# Patient Record
Sex: FEMALE | Race: WHITE | Employment: OTHER | URBAN - METROPOLITAN AREA
[De-identification: names, ages, dates, MRNs, and addresses within clinical notes are randomized per-mention and may not be internally consistent; named-entity substitution may affect disease eponyms.]

---

## 2017-01-26 ENCOUNTER — OFFICE VISIT (OUTPATIENT)
Dept: ORTHOPEDIC SURGERY | Age: 82
End: 2017-01-26

## 2017-01-26 VITALS
BODY MASS INDEX: 42.21 KG/M2 | HEIGHT: 67 IN | HEART RATE: 55 BPM | WEIGHT: 268.96 LBS | DIASTOLIC BLOOD PRESSURE: 70 MMHG | SYSTOLIC BLOOD PRESSURE: 158 MMHG

## 2017-01-26 DIAGNOSIS — M17.11 PRIMARY OSTEOARTHRITIS OF RIGHT KNEE: Primary | ICD-10-CM

## 2017-01-26 DIAGNOSIS — M25.561 PAIN, JOINT, KNEE, RIGHT: ICD-10-CM

## 2017-01-26 PROCEDURE — G8427 DOCREV CUR MEDS BY ELIG CLIN: HCPCS | Performed by: ORTHOPAEDIC SURGERY

## 2017-01-26 PROCEDURE — 20611 DRAIN/INJ JOINT/BURSA W/US: CPT | Performed by: ORTHOPAEDIC SURGERY

## 2017-01-26 PROCEDURE — 99213 OFFICE O/P EST LOW 20 MIN: CPT | Performed by: ORTHOPAEDIC SURGERY

## 2017-01-26 PROCEDURE — 73564 X-RAY EXAM KNEE 4 OR MORE: CPT | Performed by: ORTHOPAEDIC SURGERY

## 2017-01-26 PROCEDURE — 1123F ACP DISCUSS/DSCN MKR DOCD: CPT | Performed by: ORTHOPAEDIC SURGERY

## 2017-01-26 PROCEDURE — 1036F TOBACCO NON-USER: CPT | Performed by: ORTHOPAEDIC SURGERY

## 2017-01-26 PROCEDURE — G8419 CALC BMI OUT NRM PARAM NOF/U: HCPCS | Performed by: ORTHOPAEDIC SURGERY

## 2017-01-26 PROCEDURE — 4040F PNEUMOC VAC/ADMIN/RCVD: CPT | Performed by: ORTHOPAEDIC SURGERY

## 2017-01-26 PROCEDURE — 1090F PRES/ABSN URINE INCON ASSESS: CPT | Performed by: ORTHOPAEDIC SURGERY

## 2017-01-26 PROCEDURE — G8484 FLU IMMUNIZE NO ADMIN: HCPCS | Performed by: ORTHOPAEDIC SURGERY

## 2017-01-26 RX ORDER — CLONIDINE HYDROCHLORIDE 0.1 MG/1
0.1 TABLET ORAL 2 TIMES DAILY
Status: ON HOLD | COMMUNITY
End: 2017-12-01 | Stop reason: CLARIF

## 2017-02-06 ENCOUNTER — OFFICE VISIT (OUTPATIENT)
Dept: ORTHOPEDIC SURGERY | Age: 82
End: 2017-02-06

## 2017-02-06 DIAGNOSIS — M17.11 PRIMARY OSTEOARTHRITIS OF RIGHT KNEE: Primary | ICD-10-CM

## 2017-02-06 PROCEDURE — 20611 DRAIN/INJ JOINT/BURSA W/US: CPT | Performed by: ORTHOPAEDIC SURGERY

## 2017-02-06 PROCEDURE — 1036F TOBACCO NON-USER: CPT | Performed by: ORTHOPAEDIC SURGERY

## 2017-02-13 ENCOUNTER — OFFICE VISIT (OUTPATIENT)
Dept: ORTHOPEDIC SURGERY | Age: 82
End: 2017-02-13

## 2017-02-13 DIAGNOSIS — M17.11 PRIMARY OSTEOARTHRITIS OF RIGHT KNEE: Primary | ICD-10-CM

## 2017-02-13 PROCEDURE — 20611 DRAIN/INJ JOINT/BURSA W/US: CPT | Performed by: ORTHOPAEDIC SURGERY

## 2017-02-13 PROCEDURE — 1036F TOBACCO NON-USER: CPT | Performed by: ORTHOPAEDIC SURGERY

## 2017-03-01 ENCOUNTER — OFFICE VISIT (OUTPATIENT)
Dept: ORTHOPEDIC SURGERY | Age: 82
End: 2017-03-01

## 2017-03-01 VITALS
BODY MASS INDEX: 47.09 KG/M2 | HEART RATE: 59 BPM | SYSTOLIC BLOOD PRESSURE: 124 MMHG | WEIGHT: 293 LBS | DIASTOLIC BLOOD PRESSURE: 52 MMHG | HEIGHT: 66 IN

## 2017-03-01 DIAGNOSIS — M51.36 DDD (DEGENERATIVE DISC DISEASE), LUMBAR: Primary | ICD-10-CM

## 2017-03-01 PROCEDURE — G8484 FLU IMMUNIZE NO ADMIN: HCPCS | Performed by: PHYSICAL MEDICINE & REHABILITATION

## 2017-03-01 PROCEDURE — 1123F ACP DISCUSS/DSCN MKR DOCD: CPT | Performed by: PHYSICAL MEDICINE & REHABILITATION

## 2017-03-01 PROCEDURE — 72100 X-RAY EXAM L-S SPINE 2/3 VWS: CPT | Performed by: PHYSICAL MEDICINE & REHABILITATION

## 2017-03-01 PROCEDURE — G8427 DOCREV CUR MEDS BY ELIG CLIN: HCPCS | Performed by: PHYSICAL MEDICINE & REHABILITATION

## 2017-03-01 PROCEDURE — 4040F PNEUMOC VAC/ADMIN/RCVD: CPT | Performed by: PHYSICAL MEDICINE & REHABILITATION

## 2017-03-01 PROCEDURE — 99213 OFFICE O/P EST LOW 20 MIN: CPT | Performed by: PHYSICAL MEDICINE & REHABILITATION

## 2017-03-01 PROCEDURE — G8417 CALC BMI ABV UP PARAM F/U: HCPCS | Performed by: PHYSICAL MEDICINE & REHABILITATION

## 2017-03-01 PROCEDURE — 1036F TOBACCO NON-USER: CPT | Performed by: PHYSICAL MEDICINE & REHABILITATION

## 2017-03-01 PROCEDURE — 1090F PRES/ABSN URINE INCON ASSESS: CPT | Performed by: PHYSICAL MEDICINE & REHABILITATION

## 2017-03-01 RX ORDER — TRAMADOL HYDROCHLORIDE 50 MG/1
TABLET ORAL
Qty: 150 TABLET | Refills: 2 | Status: SHIPPED | OUTPATIENT
Start: 2017-03-01 | End: 2017-09-05

## 2017-06-06 ENCOUNTER — OFFICE VISIT (OUTPATIENT)
Dept: ORTHOPEDIC SURGERY | Age: 82
End: 2017-06-06

## 2017-06-06 VITALS
BODY MASS INDEX: 47.09 KG/M2 | SYSTOLIC BLOOD PRESSURE: 106 MMHG | HEIGHT: 66 IN | WEIGHT: 293 LBS | DIASTOLIC BLOOD PRESSURE: 42 MMHG | HEART RATE: 57 BPM

## 2017-06-06 DIAGNOSIS — M51.36 DDD (DEGENERATIVE DISC DISEASE), LUMBAR: Primary | ICD-10-CM

## 2017-06-06 DIAGNOSIS — M48.061 LUMBAR STENOSIS: ICD-10-CM

## 2017-06-06 PROCEDURE — 99214 OFFICE O/P EST MOD 30 MIN: CPT | Performed by: PHYSICIAN ASSISTANT

## 2017-06-06 PROCEDURE — 1090F PRES/ABSN URINE INCON ASSESS: CPT | Performed by: PHYSICIAN ASSISTANT

## 2017-06-06 PROCEDURE — 1036F TOBACCO NON-USER: CPT | Performed by: PHYSICIAN ASSISTANT

## 2017-06-06 PROCEDURE — 1123F ACP DISCUSS/DSCN MKR DOCD: CPT | Performed by: PHYSICIAN ASSISTANT

## 2017-06-06 PROCEDURE — G8427 DOCREV CUR MEDS BY ELIG CLIN: HCPCS | Performed by: PHYSICIAN ASSISTANT

## 2017-06-06 PROCEDURE — 4040F PNEUMOC VAC/ADMIN/RCVD: CPT | Performed by: PHYSICIAN ASSISTANT

## 2017-06-06 PROCEDURE — G8417 CALC BMI ABV UP PARAM F/U: HCPCS | Performed by: PHYSICIAN ASSISTANT

## 2017-06-06 RX ORDER — TRAMADOL HYDROCHLORIDE 50 MG/1
TABLET ORAL
Qty: 150 TABLET | Refills: 2 | Status: SHIPPED | OUTPATIENT
Start: 2017-06-28 | End: 2017-09-05 | Stop reason: SDUPTHER

## 2017-06-06 RX ORDER — PREDNISONE 10 MG/1
10 TABLET ORAL DAILY
Status: ON HOLD | COMMUNITY
Start: 2017-04-10 | End: 2018-02-20 | Stop reason: DRUGHIGH

## 2017-09-05 ENCOUNTER — OFFICE VISIT (OUTPATIENT)
Dept: ORTHOPEDIC SURGERY | Age: 82
End: 2017-09-05

## 2017-09-05 VITALS
SYSTOLIC BLOOD PRESSURE: 139 MMHG | HEART RATE: 73 BPM | DIASTOLIC BLOOD PRESSURE: 55 MMHG | HEIGHT: 66 IN | BODY MASS INDEX: 47.09 KG/M2 | WEIGHT: 293 LBS

## 2017-09-05 DIAGNOSIS — M48.061 LUMBAR STENOSIS: ICD-10-CM

## 2017-09-05 DIAGNOSIS — M51.36 DDD (DEGENERATIVE DISC DISEASE), LUMBAR: Primary | ICD-10-CM

## 2017-09-05 PROCEDURE — 1123F ACP DISCUSS/DSCN MKR DOCD: CPT | Performed by: PHYSICIAN ASSISTANT

## 2017-09-05 PROCEDURE — G8427 DOCREV CUR MEDS BY ELIG CLIN: HCPCS | Performed by: PHYSICIAN ASSISTANT

## 2017-09-05 PROCEDURE — 4040F PNEUMOC VAC/ADMIN/RCVD: CPT | Performed by: PHYSICIAN ASSISTANT

## 2017-09-05 PROCEDURE — 80305 DRUG TEST PRSMV DIR OPT OBS: CPT | Performed by: PHYSICIAN ASSISTANT

## 2017-09-05 PROCEDURE — 1036F TOBACCO NON-USER: CPT | Performed by: PHYSICIAN ASSISTANT

## 2017-09-05 PROCEDURE — 1090F PRES/ABSN URINE INCON ASSESS: CPT | Performed by: PHYSICIAN ASSISTANT

## 2017-09-05 PROCEDURE — 99214 OFFICE O/P EST MOD 30 MIN: CPT | Performed by: PHYSICIAN ASSISTANT

## 2017-09-05 PROCEDURE — G8417 CALC BMI ABV UP PARAM F/U: HCPCS | Performed by: PHYSICIAN ASSISTANT

## 2017-09-05 RX ORDER — TRAMADOL HYDROCHLORIDE 50 MG/1
TABLET ORAL
Qty: 150 TABLET | Refills: 1 | Status: SHIPPED | OUTPATIENT
Start: 2017-10-05 | End: 2017-12-13 | Stop reason: SDUPTHER

## 2017-10-09 ENCOUNTER — OFFICE VISIT (OUTPATIENT)
Dept: ORTHOPEDIC SURGERY | Age: 82
End: 2017-10-09

## 2017-10-09 DIAGNOSIS — M17.11 PRIMARY OSTEOARTHRITIS OF RIGHT KNEE: Primary | ICD-10-CM

## 2017-10-09 PROCEDURE — 20611 DRAIN/INJ JOINT/BURSA W/US: CPT | Performed by: ORTHOPAEDIC SURGERY

## 2017-10-09 PROCEDURE — 1036F TOBACCO NON-USER: CPT | Performed by: ORTHOPAEDIC SURGERY

## 2017-10-09 NOTE — PROGRESS NOTES
Diagnosis: Right knee osteoarthritis    Procedure: Right knee Visco supplementation injection #1    The patient is symptomatic from osteoarthritis of the right knee joint with documented radiological signs of arthritis. The patient has also failed 3 months of conservative treatment including home exercise, education, Tylenol and/or NSAIDs use. The patient was offered a Visco supplementation today. Risks, benefits, and alternatives to the injections were discussed in detail with the patient. The risks discussed included but are not limited to infection, skin reactions, hot swollen joints, and anaphylaxis. The patient gave verbal informed consent for the injection. The patient's skin was prepped with  3 sterile gauze  pads soaked with alcohol solution and the knee joint was injected with 2 mL of  Euflexxa intra-articularly under sterile conditions. Technique: Under sterile conditions a [employer] ultrasound unit with a variable frequency (6.0-15.0 MHz) linear transducer was used to localize the placement of a 22-gauge needle into the knee joint. Findings: Successful needle placement for intra-articular Visco supplementation injection. Final images were taken and saved for permanent record. The patient tolerated the injection reasonably well. The patient was given instructions to ice the kne and avoid strenuous activities for 24-48 hours. The patient was instructed to call the office immediately if there is increased pain, redness, warmth, fever, or chills. We will see the patient back in one week for their second injection.

## 2017-10-16 ENCOUNTER — OFFICE VISIT (OUTPATIENT)
Dept: ORTHOPEDIC SURGERY | Age: 82
End: 2017-10-16

## 2017-10-16 DIAGNOSIS — M17.11 PRIMARY OSTEOARTHRITIS OF RIGHT KNEE: Primary | ICD-10-CM

## 2017-10-16 PROCEDURE — 20611 DRAIN/INJ JOINT/BURSA W/US: CPT | Performed by: ORTHOPAEDIC SURGERY

## 2017-10-16 PROCEDURE — 1036F TOBACCO NON-USER: CPT | Performed by: ORTHOPAEDIC SURGERY

## 2017-10-16 NOTE — PROGRESS NOTES
and the puncture site sealed with a Band-Aid. Technique: Under sterile conditions a [employer] ultrasound unit with a variable frequency (6.0-15.0 MHz) linear transducer was used to localize the placement of a 22-gauge needle into the knee joint. Findings: Successful needle placement for knee injection. Final images were taken and saved for permanent record. The patient tolerated the injection well. The patient was instructed to call the office immediately if there is any pain, redness, warmth, fever, or chills.

## 2017-10-23 ENCOUNTER — OFFICE VISIT (OUTPATIENT)
Dept: ORTHOPEDIC SURGERY | Age: 82
End: 2017-10-23

## 2017-10-23 DIAGNOSIS — M17.11 PRIMARY OSTEOARTHRITIS OF RIGHT KNEE: Primary | ICD-10-CM

## 2017-10-23 PROCEDURE — 20611 DRAIN/INJ JOINT/BURSA W/US: CPT | Performed by: ORTHOPAEDIC SURGERY

## 2017-10-23 NOTE — PROGRESS NOTES
Diagnosis: Right knee osteoarthritis. Procedure: Right knee Visco supplementation injection #3    The patient returns today for their third and final Euflexxa injection in the right knee. The risks, benefits, and complications of the injections were again discussed in detail with the patient. The risks discussed include but are not limited to infection, skin reactions, hot swollen joints, and anaphylaxis. The patient gave verbal informed consent for the injection. The patient's skin was prepped with 3 sterile gauze pads soaked with alcohol solution and the knee joint was injected with 2 mL of Euflexxa intra-articularly under sterile conditions. Technique: Under sterile conditions a [employer] ultrasound unit with a variable frequency (6.0-15.0 MHz) linear transducer was used to localize the placement of a 22-gauge needle into the knee joint. Findings: Successful needle placement for intra-articular Visco supplementation injection. Final images were taken and saved for permanent record. The patient tolerated the injection reasonably well. The patient was given instructions to ice of the knee and avoid strenuous activity for 24-48 hours. The patient was instructed to call the office immediately if there is increased pain, redness, warmth, fever, or chills. We will see the patient back on an as-needed basis  from this point.

## 2017-10-23 NOTE — PROGRESS NOTES
10/23/17 8:27 AM         NDC: [id number]    Lot Number: [id number]    Comments: 2ML RT KNEE EUFLEXXA  EXP 9/24/18

## 2017-10-27 ENCOUNTER — TELEPHONE (OUTPATIENT)
Dept: ORTHOPEDIC SURGERY | Age: 82
End: 2017-10-27

## 2017-12-01 ENCOUNTER — TELEPHONE (OUTPATIENT)
Dept: ORTHOPEDIC SURGERY | Age: 82
End: 2017-12-01

## 2017-12-01 PROBLEM — L03.90 CELLULITIS: Status: ACTIVE | Noted: 2017-12-01

## 2017-12-01 PROBLEM — D72.829 LEUKOCYTOSIS: Status: ACTIVE | Noted: 2017-12-01

## 2017-12-01 PROBLEM — I50.9 CHF (CONGESTIVE HEART FAILURE) (HCC): Status: ACTIVE | Noted: 2017-12-01

## 2017-12-01 PROBLEM — I10 HTN (HYPERTENSION): Status: ACTIVE | Noted: 2017-12-01

## 2017-12-01 PROBLEM — N18.30 CKD (CHRONIC KIDNEY DISEASE), STAGE III (HCC): Status: ACTIVE | Noted: 2017-12-01

## 2017-12-01 PROBLEM — N17.9 AKI (ACUTE KIDNEY INJURY) (HCC): Status: ACTIVE | Noted: 2017-12-01

## 2017-12-01 NOTE — TELEPHONE ENCOUNTER
PER DR Stephani Cardenas OK TO WYATT REFILL OF TRAMADOL WITH ONLY ENOUGH TO LAST UNTIL F/U APPOINTMENT ON 12/13/17. SPOKE WITH PATIENTS BOBBY HUMPHREYS AND LET HER KNOW.   ALSO CALLED [facility] AT [phone]

## 2017-12-08 ENCOUNTER — TELEPHONE (OUTPATIENT)
Dept: TELEMETRY | Age: 82
End: 2017-12-08

## 2017-12-08 NOTE — TELEPHONE ENCOUNTER
[address]    SYMPTOM ASSESSMENT: Post discharge follow-up phone call made to patient; patient's daughter answered the phone. States her mother is without shortness of breath, chest pain, edema, or difficulty sleeping; stated she has been feeling \"good\" since discharge. Call was brief as patient's daughter stated the Alexandr Bhatt RN was at the house at this time. MEDICATION REVIEW: Daughter declined medication review; stated the Alexandr Bhatt RN was going over medications at this time. FOLLOW-UP APPOINTMENT: Patient's daughter states she cancelled patient's follow-up appointment for Monday, December 11 and is calling to reschedule for later in the week. EDUCATION: Educated patient's daughter on sodium restriction of < 2,000 mg and fluid restriction of < 64 oz, daily weights, follow-up, and medication compliance. Reviewed recommended level of activity. Notified patient to call the doctor post discharge if her mother experiences shortness of breath, chest pain, swelling, cough, or weight gain or loss of 2-3 pounds in a day/5 pounds in a week. Also notified patient's daughter to call the doctor if her mother feels dizzy, increased fatigue, decreased or difficulty urinating. Daughter verbalized understanding; stated she will call the doctor with any questions or signs of symptom worsening. No additional questions at this time. ----------------------------    Also spoke with patient's [facility] [address] who questioned if patient needs cardiology follow-up and if HF was first diagnosed this hospital admission; notified Alexandr Bhatt RN that there is documented diastolic HF this most recent admission in addition to acute on chronic kidney disease; recommended patient schedule a time to follow-up with Dr. Adrianna Mckenzie within the two weeks; Jd Mirza states she will call [facility] to assist in scheduling an appointment.  Jd Mirza states she has orders for a BMP on Wednesday and will fax those results to the nephrologist. Denies any other questions. Reviewed HF education to reinforce with patient.

## 2017-12-13 ENCOUNTER — OFFICE VISIT (OUTPATIENT)
Dept: ORTHOPEDIC SURGERY | Age: 82
End: 2017-12-13

## 2017-12-13 VITALS
HEART RATE: 63 BPM | DIASTOLIC BLOOD PRESSURE: 60 MMHG | WEIGHT: 293 LBS | BODY MASS INDEX: 47.09 KG/M2 | HEIGHT: 66 IN | SYSTOLIC BLOOD PRESSURE: 118 MMHG

## 2017-12-13 DIAGNOSIS — M54.50 CHRONIC BILATERAL LOW BACK PAIN WITHOUT SCIATICA: Primary | ICD-10-CM

## 2017-12-13 DIAGNOSIS — M51.36 DDD (DEGENERATIVE DISC DISEASE), LUMBAR: ICD-10-CM

## 2017-12-13 DIAGNOSIS — M48.062 SPINAL STENOSIS OF LUMBAR REGION WITH NEUROGENIC CLAUDICATION: ICD-10-CM

## 2017-12-13 DIAGNOSIS — G89.29 CHRONIC BILATERAL LOW BACK PAIN WITHOUT SCIATICA: Primary | ICD-10-CM

## 2017-12-13 PROCEDURE — G8484 FLU IMMUNIZE NO ADMIN: HCPCS | Performed by: PHYSICIAN ASSISTANT

## 2017-12-13 PROCEDURE — 1036F TOBACCO NON-USER: CPT | Performed by: PHYSICIAN ASSISTANT

## 2017-12-13 PROCEDURE — 99213 OFFICE O/P EST LOW 20 MIN: CPT | Performed by: PHYSICIAN ASSISTANT

## 2017-12-13 PROCEDURE — G8417 CALC BMI ABV UP PARAM F/U: HCPCS | Performed by: PHYSICIAN ASSISTANT

## 2017-12-13 PROCEDURE — 1123F ACP DISCUSS/DSCN MKR DOCD: CPT | Performed by: PHYSICIAN ASSISTANT

## 2017-12-13 PROCEDURE — 1111F DSCHRG MED/CURRENT MED MERGE: CPT | Performed by: PHYSICIAN ASSISTANT

## 2017-12-13 PROCEDURE — 1090F PRES/ABSN URINE INCON ASSESS: CPT | Performed by: PHYSICIAN ASSISTANT

## 2017-12-13 PROCEDURE — G8427 DOCREV CUR MEDS BY ELIG CLIN: HCPCS | Performed by: PHYSICIAN ASSISTANT

## 2017-12-13 PROCEDURE — 4040F PNEUMOC VAC/ADMIN/RCVD: CPT | Performed by: PHYSICIAN ASSISTANT

## 2017-12-13 RX ORDER — LIDOCAINE 50 MG/G
1 PATCH TOPICAL DAILY
Qty: 30 PATCH | Refills: 2 | Status: SHIPPED | OUTPATIENT
Start: 2017-12-13 | End: 2018-02-20 | Stop reason: ALTCHOICE

## 2017-12-13 RX ORDER — TRAMADOL HYDROCHLORIDE 50 MG/1
TABLET ORAL
Qty: 150 TABLET | Refills: 2 | Status: ON HOLD | OUTPATIENT
Start: 2017-12-13 | End: 2018-02-24

## 2017-12-13 NOTE — PROGRESS NOTES
Follow up: SPINE    CHIEF COMPLAINT:    Chief Complaint   Patient presents with    Back Pain       HISTORY OF PRESENT ILLNESS:                The patient is a 80 y.o. female here to follow up medication maintenance For history of chronic aching axial low back pain. Symptoms are constant but increased with any walking standing and activity. Some relief with resting. Her pain is been increased over the last year and a half. She reports some improvement with Tramadol 50 i po q4-6 PRN, max 5 tablets a day. She does believe this medication allows her to be more functional she is able to ambulate short community distances with cane or walker.  She denies any lower extremity radiating pain, no progressive numbness tingling or weakness.  No recent bowel or bladder changes.  No recent trauma. She was recently hospitalized for cellulitis treated with IV and oral antibiotics with improvement. She currently denies any new fevers or chills.     Current/Past Treatment:   · Physical Therapy: Yes, prior bracing w/out relief   · Chiropractic: Acupuncture   · Injection: ESIs, GTB--both w/limited relief   · Medications: Tramadol 50mg max 5 tablets/day--no side effects; hypotension with Percocet, codeine or morphine                                             Surgery/Consult: no        Function-Does the pain medication improve your ability to do:   · Personal care: Yes  · Housework: Yes   · Physical activity: Yes  · Social activity: Yes    Pain Scale: 1-10  With Meds:   7/10  Pain Scale: 1-10 Without Meds: 10+/10    Potential aberrant drug-related behavior:  · Aberrant behavior identified? NO  · Potential aberrant behavior identified? NO  · Reports loss are stolen prescriptions? NO  · Insist on certain medications by name? NO  · Purposeful oversedation? NO  · Increased dose without authorization?  NO  · MED: 25  · Last UDS: 9-2017    Side Effects: Denies    Past Medical History: Medical history form was reviewd today & scanned into the Media tab  Past Medical History:   Diagnosis Date    Arthritis     Bladder leak     Cellulitis     CKD (chronic kidney disease)     Depression     HTN (hypertension) 12/1/2017    Tinnitus     Wears glasses         REVIEW OF SYSTEMS:   CONSTITUTIONAL: Denies unexplained weight loss, fevers, chills or fatigue  NEUROLOGIC: Denies tremors or seizures         PHYSICAL EXAM:    Vitals: Blood pressure 118/60, pulse 63, height 5' 5.98\" (1.676 m), weight 295 lb 13.7 oz (134.2 kg). GENERAL EXAM:  · General Apparence: Obesity. Patient is adequately groomed with no evidence of malnutrition. · Orientation: The patient is oriented to time, place and person. · Mood & Affect:The patient's mood and affect are appropriate  · Sensation: Sensation is intact without deficit  LUMBAR/SACRAL EXAMINATION:  · Inspection: Local inspection shows no step-off or bruising. Lumbar alignment is normal.  Sagittal and Coronal balance is neutral.      · Palpation:   No evidence of tenderness at the midline. No tenderness bilaterally at the paraspinal or trochanters. There is no step-off or paraspinal spasm. · Range of Motion:  Able to sit forward flex without pain   · Strength:   Strength testing is 5/5 in all muscle groups tested. · Special Tests:   Straight leg raise and crossed SLR negative. Leg length and pelvis level.  0 out of 5 Asim's signs. · Reflexes: Reflexes are symmetrically trace with reinforcement at the patellar and ankle tendons. Clonus absent bilaterally at the feet. · Gait & station: Wheelchair    · Additional Examinations:   · RIGHT LOWER EXTREMITY: Inspection/examination of the right lower extremity does not show any tenderness, deformity or injury. Range of motion is full. There is no gross instability. Strength and tone are normal.  · LEFT LOWER EXTREMITY:  Inspection/examination of the left lower extremity does not show any tenderness, deformity or injury. Range of motion is full. There is no gross instability. Strength and tone are normal.    Diagnostic Testing:   UDS 9-5-17 + for BZO only ([facility]ium)      X-rays repeated today AP and lateral lumbar spine 2 views 3/1/17 show L2-3 DDD and retrolisthesis, multilevel DDD, no fracture     Lumbar MRI 12/03/2012 has shown severe lumbar stenosis L4-5 and L5-S1 with multilevel DDD, facet arthropathy and underlying scoliosis               Impression:   1) Chronic mechanical back pain, stable  2) Severe lumbar stenosis, lumbar DDD   3) Opioid maintenance, low risk        Plan:    1) Tramadol 50mg i po q4-6 PRN max 5 a day #150 2 refills  2) Lidoderm patches, we did discuss these likely will not be covered by insurance  3) F/u 3mo   The risks and use of maintenance opiate medication were reviewed. These include the risk of tolerance, addition, and abuse. Potential side effects were also discussed. Medications are to be taken as prescribed and not escalated without prior agreement. LIBRADO/MARISABEL reviewed & appropriate.    Opiate medications will only be prescribed through the office of LEBRON Figueroa unless notified otherwise             [facility]

## 2018-02-20 PROBLEM — M62.82 RHABDOMYOLYSIS: Status: ACTIVE | Noted: 2018-02-20

## 2018-03-08 ENCOUNTER — TELEPHONE (OUTPATIENT)
Dept: CARDIOLOGY CLINIC | Age: 83
End: 2018-03-08

## 2018-03-08 NOTE — TELEPHONE ENCOUNTER
Patients daughter calling to inform us that the patient could not tolerate the heart monitor. I spoke with Yolanda at [facility] and they will not bill the patient. I let the patients daughter know.

## 2018-03-13 ENCOUNTER — OFFICE VISIT (OUTPATIENT)
Dept: ORTHOPEDIC SURGERY | Age: 83
End: 2018-03-13

## 2018-03-13 VITALS
HEART RATE: 72 BPM | SYSTOLIC BLOOD PRESSURE: 126 MMHG | DIASTOLIC BLOOD PRESSURE: 60 MMHG | HEIGHT: 66 IN | WEIGHT: 293 LBS | BODY MASS INDEX: 47.09 KG/M2

## 2018-03-13 DIAGNOSIS — G89.4 CHRONIC PAIN SYNDROME: Primary | ICD-10-CM

## 2018-03-13 DIAGNOSIS — M51.36 DDD (DEGENERATIVE DISC DISEASE), LUMBAR: ICD-10-CM

## 2018-03-13 DIAGNOSIS — M48.062 SPINAL STENOSIS OF LUMBAR REGION WITH NEUROGENIC CLAUDICATION: ICD-10-CM

## 2018-03-13 PROCEDURE — 99213 OFFICE O/P EST LOW 20 MIN: CPT | Performed by: PHYSICIAN ASSISTANT

## 2018-03-13 PROCEDURE — G8427 DOCREV CUR MEDS BY ELIG CLIN: HCPCS | Performed by: PHYSICIAN ASSISTANT

## 2018-03-13 PROCEDURE — 1123F ACP DISCUSS/DSCN MKR DOCD: CPT | Performed by: PHYSICIAN ASSISTANT

## 2018-03-13 PROCEDURE — 1036F TOBACCO NON-USER: CPT | Performed by: PHYSICIAN ASSISTANT

## 2018-03-13 PROCEDURE — 4040F PNEUMOC VAC/ADMIN/RCVD: CPT | Performed by: PHYSICIAN ASSISTANT

## 2018-03-13 PROCEDURE — 1090F PRES/ABSN URINE INCON ASSESS: CPT | Performed by: PHYSICIAN ASSISTANT

## 2018-03-13 PROCEDURE — 1111F DSCHRG MED/CURRENT MED MERGE: CPT | Performed by: PHYSICIAN ASSISTANT

## 2018-03-13 PROCEDURE — G8484 FLU IMMUNIZE NO ADMIN: HCPCS | Performed by: PHYSICIAN ASSISTANT

## 2018-03-13 PROCEDURE — G8417 CALC BMI ABV UP PARAM F/U: HCPCS | Performed by: PHYSICIAN ASSISTANT

## 2018-03-13 RX ORDER — TRAMADOL HYDROCHLORIDE 50 MG/1
TABLET ORAL
Qty: 150 TABLET | Refills: 2 | Status: SHIPPED | OUTPATIENT
Start: 2018-03-13 | End: 2018-06-13 | Stop reason: SDUPTHER

## 2018-03-13 NOTE — PROGRESS NOTES
Follow up: SPINE    CHIEF COMPLAINT:    Chief Complaint   Patient presents with    Follow-up     BACK        HISTORY OF PRESENT ILLNESS:                The patient is a 80 y.o. female here to follow up medication maintenance For chronic axial aching low back pain. Symptoms are constant but worsened with any walking standing or activity. Relief with resting. She did fall the end of February landing on her knee and was hospitalized for traumatic rhabdomyolysis with subsequent rehab at the [facility]. She is scheduled to return home tomorrow. She still reports good improvement with tramadol 50mg I po q4-6 PRN (max 5 w/out side effects. She states this allows her to be more functional able to transition and ambulate short 90 distances with walker. At this time she denies any changes in her chronic underlying low back pain. She still denies any lower external any radiating pain no progressive numbness tingling or weakness. No recent bowel or bladder changes. Current/Past Treatment:   · Physical Therapy: Yes, prior bracing w/out relief   · Chiropractic: Acupuncture   · Injection: ESIs, GTB--both w/limited relief   · Medications: Tramadol 50mg max 5 tablets/day--no side effects; hypotension with Percocet, codeine or morphine                                             Surgery/Consult: no       Function-Does the pain medication improve your ability to do:   · Personal care: Yes  · Housework: Yes   · Physical activity: Yes  · Social activity: Yes  ·   Pain Scale: 1-10  With Meds:   7/10   Pain Scale: 1-10 Without Meds: 10+    Potential aberrant drug-related behavior:  · Aberrant behavior identified? NO  · Potential aberrant behavior identified? NO  · Reports loss are stolen prescriptions? NO  · Insist on certain medications by name? NO  · Purposeful oversedation? NO  · Increased dose without authorization?  NO    Pain f/u information:  · MED: 25  · Last UDS: 9-2017   · Pain contract/ORT: Today 3-13-18     Side findings, cervical spondylosis     UDS 9-5-17 + for BZO only ([facility])      X-rays repeated today AP and lateral lumbar spine 2 views 3/1/17 show L2-3 DDD and retrolisthesis, multilevel DDD, no fracture     Lumbar MRI 12/03/2012 has shown severe lumbar stenosis L4-5 and L5-S1 with multilevel DDD, facet arthropathy and underlying scoliosis               Impression:   1) Chronic mechanical back pain, stable  2) Severe lumbar stenosis, lumbar DDD   3) Opioid maintenance, low risk  4) Recent hospitalization for  traumatic rhabdomyolysis       Plan:    1) Tramadol 50mg I po q4-6 PRN max 5/day. Take least amount to manage pain   2) F/u 3mo    Controlled Substances Monitoring: The Prescription Monitoring Report for this patient was reviewed today. (Natty Madden PA-C)    Possible medication side effects, risk of tolerance/dependence & alternative treatments discussed., Obtaining appropriate analgesic effect of treatment., No signs of potential drug abuse or diversion identified. (Natty Madden PA-C)         Treatment objectives documented - patient is progressing appropriately. , Functional status reviewed - continues with improved or maintaining ADL's., Reestablished informed consent., Reviewed the patient's functional status and documentation. (Natty Madden PA-C)    Medication contract signed today. (Natty Madden PA-C)      The risks and use of maintenance opiate medication were reviewed. These include the risk of tolerance, addition, and abuse. Potential side effects were also discussed. Medications are to be taken as prescribed and not escalated without prior agreement. OARRS/MARISABEL reviewed & appropriate.    Opiate medications will only be prescribed through the office of LEBRON Michael unless notified otherwise             Natty Madden  [facility]

## 2018-05-10 ENCOUNTER — NURSE ONLY (OUTPATIENT)
Dept: ORTHOPEDIC SURGERY | Age: 83
End: 2018-05-10

## 2018-05-10 DIAGNOSIS — M17.11 PRIMARY OSTEOARTHRITIS OF RIGHT KNEE: Primary | ICD-10-CM

## 2018-05-10 PROCEDURE — 20611 DRAIN/INJ JOINT/BURSA W/US: CPT | Performed by: PHYSICIAN ASSISTANT

## 2018-05-17 ENCOUNTER — NURSE ONLY (OUTPATIENT)
Dept: ORTHOPEDIC SURGERY | Age: 83
End: 2018-05-17

## 2018-05-17 DIAGNOSIS — M17.11 PRIMARY OSTEOARTHRITIS OF RIGHT KNEE: Primary | ICD-10-CM

## 2018-05-17 PROCEDURE — 20611 DRAIN/INJ JOINT/BURSA W/US: CPT | Performed by: PHYSICIAN ASSISTANT

## 2018-05-24 ENCOUNTER — NURSE ONLY (OUTPATIENT)
Dept: ORTHOPEDIC SURGERY | Age: 83
End: 2018-05-24

## 2018-05-24 DIAGNOSIS — M17.11 PRIMARY OSTEOARTHRITIS OF RIGHT KNEE: Primary | ICD-10-CM

## 2018-06-13 ENCOUNTER — OFFICE VISIT (OUTPATIENT)
Dept: ORTHOPEDIC SURGERY | Age: 83
End: 2018-06-13

## 2018-06-13 VITALS
DIASTOLIC BLOOD PRESSURE: 59 MMHG | SYSTOLIC BLOOD PRESSURE: 137 MMHG | WEIGHT: 293 LBS | HEART RATE: 62 BPM | BODY MASS INDEX: 47.09 KG/M2 | HEIGHT: 66 IN

## 2018-06-13 DIAGNOSIS — G89.4 CHRONIC PAIN SYNDROME: ICD-10-CM

## 2018-06-13 DIAGNOSIS — M48.062 SPINAL STENOSIS OF LUMBAR REGION WITH NEUROGENIC CLAUDICATION: ICD-10-CM

## 2018-06-13 DIAGNOSIS — M51.36 DDD (DEGENERATIVE DISC DISEASE), LUMBAR: ICD-10-CM

## 2018-06-13 PROCEDURE — G8417 CALC BMI ABV UP PARAM F/U: HCPCS | Performed by: PHYSICIAN ASSISTANT

## 2018-06-13 PROCEDURE — 1090F PRES/ABSN URINE INCON ASSESS: CPT | Performed by: PHYSICIAN ASSISTANT

## 2018-06-13 PROCEDURE — 99213 OFFICE O/P EST LOW 20 MIN: CPT | Performed by: PHYSICIAN ASSISTANT

## 2018-06-13 PROCEDURE — 1123F ACP DISCUSS/DSCN MKR DOCD: CPT | Performed by: PHYSICIAN ASSISTANT

## 2018-06-13 PROCEDURE — 4040F PNEUMOC VAC/ADMIN/RCVD: CPT | Performed by: PHYSICIAN ASSISTANT

## 2018-06-13 PROCEDURE — 1036F TOBACCO NON-USER: CPT | Performed by: PHYSICIAN ASSISTANT

## 2018-06-13 PROCEDURE — G8427 DOCREV CUR MEDS BY ELIG CLIN: HCPCS | Performed by: PHYSICIAN ASSISTANT

## 2018-06-13 RX ORDER — TRAMADOL HYDROCHLORIDE 50 MG/1
TABLET ORAL
Qty: 150 TABLET | Refills: 1 | Status: SHIPPED | OUTPATIENT
Start: 2018-06-13 | End: 2018-07-14

## 2018-08-22 ENCOUNTER — NURSE ONLY (OUTPATIENT)
Dept: ORTHOPEDIC SURGERY | Age: 83
End: 2018-08-22

## 2018-08-22 DIAGNOSIS — M17.11 PRIMARY OSTEOARTHRITIS OF RIGHT KNEE: Primary | ICD-10-CM

## 2018-08-22 PROCEDURE — 20611 DRAIN/INJ JOINT/BURSA W/US: CPT | Performed by: PHYSICIAN ASSISTANT

## 2018-08-22 NOTE — PROGRESS NOTES
Injection administration details:  Date & Time: 8/22/18 2:44 PM  Site & Comments:R KNEE administered by WILMA SOTO PA-C    BETA: 2  NDC: 8413-2395-76  LOT: [id number]  EXP:               0.25% Marcaine (50mL)  # of cc: 3  NDC # :6745-6420-31  LOT# : [id number]  EXP: 4/1/2019

## 2018-09-10 ENCOUNTER — OFFICE VISIT (OUTPATIENT)
Dept: ORTHOPEDIC SURGERY | Age: 83
End: 2018-09-10

## 2018-09-10 VITALS
WEIGHT: 293 LBS | BODY MASS INDEX: 47.09 KG/M2 | HEIGHT: 66 IN | SYSTOLIC BLOOD PRESSURE: 125 MMHG | DIASTOLIC BLOOD PRESSURE: 55 MMHG | HEART RATE: 63 BPM

## 2018-09-10 DIAGNOSIS — M51.36 DDD (DEGENERATIVE DISC DISEASE), LUMBAR: ICD-10-CM

## 2018-09-10 DIAGNOSIS — M48.061 SPINAL STENOSIS OF LUMBAR REGION, UNSPECIFIED WHETHER NEUROGENIC CLAUDICATION PRESENT: ICD-10-CM

## 2018-09-10 DIAGNOSIS — G89.4 CHRONIC PAIN SYNDROME: Primary | ICD-10-CM

## 2018-09-10 PROCEDURE — 1101F PT FALLS ASSESS-DOCD LE1/YR: CPT | Performed by: PHYSICIAN ASSISTANT

## 2018-09-10 PROCEDURE — G8427 DOCREV CUR MEDS BY ELIG CLIN: HCPCS | Performed by: PHYSICIAN ASSISTANT

## 2018-09-10 PROCEDURE — 1123F ACP DISCUSS/DSCN MKR DOCD: CPT | Performed by: PHYSICIAN ASSISTANT

## 2018-09-10 PROCEDURE — 99214 OFFICE O/P EST MOD 30 MIN: CPT | Performed by: PHYSICIAN ASSISTANT

## 2018-09-10 PROCEDURE — 1036F TOBACCO NON-USER: CPT | Performed by: PHYSICIAN ASSISTANT

## 2018-09-10 PROCEDURE — G8417 CALC BMI ABV UP PARAM F/U: HCPCS | Performed by: PHYSICIAN ASSISTANT

## 2018-09-10 PROCEDURE — 4040F PNEUMOC VAC/ADMIN/RCVD: CPT | Performed by: PHYSICIAN ASSISTANT

## 2018-09-10 PROCEDURE — 1090F PRES/ABSN URINE INCON ASSESS: CPT | Performed by: PHYSICIAN ASSISTANT

## 2018-09-10 RX ORDER — TRAMADOL HYDROCHLORIDE 50 MG/1
50 TABLET ORAL
Qty: 150 TABLET | Refills: 2 | Status: SHIPPED | OUTPATIENT
Start: 2018-09-10 | End: 2018-10-10

## 2018-09-10 RX ORDER — DIAZEPAM 2 MG/1
2 TABLET ORAL 2 TIMES DAILY PRN
COMMUNITY

## 2018-09-10 RX ORDER — DOXAZOSIN 2 MG/1
1 TABLET ORAL NIGHTLY
COMMUNITY

## 2018-09-10 NOTE — PROGRESS NOTES
Follow up: SPINE    CHIEF COMPLAINT:    Chief Complaint   Patient presents with    Back Pain       HISTORY OF PRESENT ILLNESS:                The patient is a 80 y.o. female here to follow up medication maintenance for chronic axial aching low back pain. Symptoms are constant but worsened with any walking standing or activity. Relief with resting. She still reports good improvement with tramadol 50mg I po q4-6 PRN (max 5 tablets w/out side effects). She states this allows her to be more functional able to transition and ambulate short distances with walker. At this time she denies any changes in her chronic underlying low back pain. She still denies any lower extremity radiating pain no progressive numbness tingling or weakness. No recent bowel or bladder changes. Symptoms overall stable. Current/Past Treatment:   · Physical Therapy: Yes, prior bracing w/out relief   · Chiropractic: Acupuncture   · Injection: ESIs, GTB--both w/limited relief   · Medications: Tramadol 50mg max 5 tablets/day--no side effects; hypotension with Percocet, codeine or morphine                                             Surgery/Consult: no       Function-Does the pain medication improve your ability to do:   · Personal care: Yes  · Housework: Yes   · Physical activity: Yes  · Social activity: Yes  ·   Pain Scale: 1-10  With Meds:   7-8/10   Pain Scale: 1-10 Without Meds: 10+    Potential aberrant drug-related behavior:  · Aberrant behavior identified? NO  · Potential aberrant behavior identified? NO  · Reports loss are stolen prescriptions? NO  · Insist on certain medications by name? NO  · Purposeful oversedation? NO  · Increased dose without authorization?  NO    Pain f/u information:  · MED: 25  · Last UDS: 9-2017   · Pain contract/ORT: 3-13-18     Side Effects: Denies    Past Medical History: Medical history form was reviewd today & scanned into the Media tab  Past Medical History:   Diagnosis Date    Arthritis     Bladder leak     Cellulitis     Chronic back pain     Chronic diastolic CHF (congestive heart failure) (HCC)     see [place] [facility], follows Sunil Neal NP    CKD (chronic kidney disease)     Depression     HTN (hypertension) 12/1/2017    Tinnitus     Wears glasses         REVIEW OF SYSTEMS:   CONSTITUTIONAL: Denies unexplained weight loss, fevers, chills or fatigue  NEUROLOGIC: Denies tremors or seizures         PHYSICAL EXAM:    Vitals: Blood pressure (!) 125/55, pulse 63, height 5' 6.14\" (1.68 m), weight (!) 309 lb (140.2 kg). GENERAL EXAM:  · General Apparence: Patient is adequately groomed with no evidence of malnutrition. · Orientation: The patient is oriented to time, place and person. · Mood & Affect:The patient's mood and affect are appropriate  · Lymphatic: Chronic bilateral LE Edema, non-tender to palpation  · Sensation: Sensation is intact without deficit     LUMBAR/SACRAL EXAMINATION:  · Inspection: Local inspection shows no step-off or bruising. Kyphosis   · Palpation:   No evidence of tenderness at the midline. No tenderness bilaterally at the paraspinal or trochanters. There is no step-off or paraspinal spasm. · Range of Motion:  Able to sit forward flex without pain  · Strength:   Strength testing is 5/5 in all muscle groups tested. · Special Tests:   Straight leg raise and crossed SLR negative. Leg length and pelvis level.  0 out of 5 Asim's signs. · Skin: There are no rashes, ulcerations or lesions. · Reflexes: Reflexes are symmetrically trace with reinforcement at the patellar and ankle tendons. Clonus absent bilaterally at the feet.   · Gait & station: Wheelchair        Diagnostic Testing:   UDS 9/10/2018 positive for BZO (Valium)     Declining updated lumbar x-rays 3/13/2018    ORT=1    Cervical CT 2-2018: no acute findings, cervical spondylosis     UDS 9-5-17 + for BZO only (Valium)      X-rays repeated today AP and lateral lumbar spine 2 views 3/1/17 show L2-3 DDD and

## 2018-11-29 ENCOUNTER — NURSE ONLY (OUTPATIENT)
Dept: ORTHOPEDIC SURGERY | Age: 83
End: 2018-11-29
Payer: MEDICARE

## 2018-11-29 DIAGNOSIS — M17.11 PRIMARY OSTEOARTHRITIS OF RIGHT KNEE: Primary | ICD-10-CM

## 2018-11-29 PROCEDURE — 99999 PR OFFICE/OUTPT VISIT,PROCEDURE ONLY: CPT | Performed by: PHYSICIAN ASSISTANT

## 2018-12-06 ENCOUNTER — NURSE ONLY (OUTPATIENT)
Dept: ORTHOPEDIC SURGERY | Age: 83
End: 2018-12-06
Payer: MEDICARE

## 2018-12-06 DIAGNOSIS — M17.11 PRIMARY OSTEOARTHRITIS OF RIGHT KNEE: Primary | ICD-10-CM

## 2018-12-06 PROCEDURE — 99999 PR OFFICE/OUTPT VISIT,PROCEDURE ONLY: CPT | Performed by: PHYSICIAN ASSISTANT

## 2018-12-06 NOTE — PROGRESS NOTES
12/6/18 4:34 PM         NDC: [id number]    Lot Number: [id number]    Comments: 2ML RT KNEE EUFLEXXA  EXP 10/29/19

## 2018-12-10 ENCOUNTER — OFFICE VISIT (OUTPATIENT)
Dept: ORTHOPEDIC SURGERY | Age: 83
End: 2018-12-10
Payer: MEDICARE

## 2018-12-10 VITALS
BODY MASS INDEX: 47.09 KG/M2 | HEART RATE: 71 BPM | DIASTOLIC BLOOD PRESSURE: 59 MMHG | SYSTOLIC BLOOD PRESSURE: 123 MMHG | HEIGHT: 66 IN | WEIGHT: 293 LBS

## 2018-12-10 DIAGNOSIS — M51.36 DDD (DEGENERATIVE DISC DISEASE), LUMBAR: ICD-10-CM

## 2018-12-10 DIAGNOSIS — G89.4 CHRONIC PAIN SYNDROME: Primary | ICD-10-CM

## 2018-12-10 PROCEDURE — 1123F ACP DISCUSS/DSCN MKR DOCD: CPT | Performed by: PHYSICIAN ASSISTANT

## 2018-12-10 PROCEDURE — G8417 CALC BMI ABV UP PARAM F/U: HCPCS | Performed by: PHYSICIAN ASSISTANT

## 2018-12-10 PROCEDURE — G8484 FLU IMMUNIZE NO ADMIN: HCPCS | Performed by: PHYSICIAN ASSISTANT

## 2018-12-10 PROCEDURE — 4040F PNEUMOC VAC/ADMIN/RCVD: CPT | Performed by: PHYSICIAN ASSISTANT

## 2018-12-10 PROCEDURE — 1090F PRES/ABSN URINE INCON ASSESS: CPT | Performed by: PHYSICIAN ASSISTANT

## 2018-12-10 PROCEDURE — G8427 DOCREV CUR MEDS BY ELIG CLIN: HCPCS | Performed by: PHYSICIAN ASSISTANT

## 2018-12-10 PROCEDURE — 1036F TOBACCO NON-USER: CPT | Performed by: PHYSICIAN ASSISTANT

## 2018-12-10 PROCEDURE — 99213 OFFICE O/P EST LOW 20 MIN: CPT | Performed by: PHYSICIAN ASSISTANT

## 2018-12-10 PROCEDURE — 1101F PT FALLS ASSESS-DOCD LE1/YR: CPT | Performed by: PHYSICIAN ASSISTANT

## 2018-12-10 RX ORDER — TRAMADOL HYDROCHLORIDE 50 MG/1
50 TABLET ORAL SEE ADMIN INSTRUCTIONS
Qty: 150 TABLET | Refills: 2 | Status: SHIPPED | OUTPATIENT
Start: 2018-12-10 | End: 2019-01-09

## 2018-12-13 ENCOUNTER — NURSE ONLY (OUTPATIENT)
Dept: ORTHOPEDIC SURGERY | Age: 83
End: 2018-12-13
Payer: MEDICARE

## 2018-12-13 DIAGNOSIS — M17.11 PRIMARY OSTEOARTHRITIS OF RIGHT KNEE: Primary | ICD-10-CM

## 2018-12-13 DIAGNOSIS — M17.12 PRIMARY OSTEOARTHRITIS OF LEFT KNEE: ICD-10-CM

## 2018-12-13 PROCEDURE — 99999 PR OFFICE/OUTPT VISIT,PROCEDURE ONLY: CPT | Performed by: PHYSICIAN ASSISTANT

## 2019-01-29 ENCOUNTER — OFFICE VISIT (OUTPATIENT)
Dept: DERMATOLOGY | Age: 84
End: 2019-01-29
Payer: MEDICARE

## 2019-01-29 DIAGNOSIS — D48.5 NEOPLASM OF UNCERTAIN BEHAVIOR OF SKIN: Primary | ICD-10-CM

## 2019-01-29 PROCEDURE — 11102 TANGNTL BX SKIN SINGLE LES: CPT | Performed by: DERMATOLOGY

## 2019-01-31 ENCOUNTER — TELEPHONE (OUTPATIENT)
Dept: DERMATOLOGY | Age: 84
End: 2019-01-31

## 2019-01-31 DIAGNOSIS — C44.319 BASAL CELL CARCINOMA (BCC) OF SKIN OF OTHER PART OF FACE: Primary | ICD-10-CM

## 2019-01-31 LAB — DERMATOLOGY PATHOLOGY REPORT: ABNORMAL

## 2019-02-13 ENCOUNTER — NURSE ONLY (OUTPATIENT)
Dept: ORTHOPEDIC SURGERY | Age: 84
End: 2019-02-13
Payer: MEDICARE

## 2019-02-13 DIAGNOSIS — M17.11 PRIMARY OSTEOARTHRITIS OF RIGHT KNEE: Primary | ICD-10-CM

## 2019-02-13 PROCEDURE — 99999 PR OFFICE/OUTPT VISIT,PROCEDURE ONLY: CPT | Performed by: PHYSICIAN ASSISTANT

## 2019-02-19 ENCOUNTER — PROCEDURE VISIT (OUTPATIENT)
Dept: SURGERY | Age: 84
End: 2019-02-19
Payer: MEDICARE

## 2019-02-19 VITALS
BODY MASS INDEX: 50.62 KG/M2 | HEART RATE: 71 BPM | TEMPERATURE: 98.3 F | OXYGEN SATURATION: 96 % | DIASTOLIC BLOOD PRESSURE: 83 MMHG | WEIGHT: 293 LBS | SYSTOLIC BLOOD PRESSURE: 164 MMHG

## 2019-02-19 DIAGNOSIS — C44.1191 BASAL CELL CARCINOMA OF UPPER EYELID, LEFT: Primary | ICD-10-CM

## 2019-02-19 PROCEDURE — 17311 MOHS 1 STAGE H/N/HF/G: CPT | Performed by: DERMATOLOGY

## 2019-02-19 PROCEDURE — 17312 MOHS ADDL STAGE: CPT | Performed by: DERMATOLOGY

## 2019-02-19 PROCEDURE — 13152 CMPLX RPR E/N/E/L 2.6-7.5 CM: CPT | Performed by: DERMATOLOGY

## 2019-02-20 ENCOUNTER — TELEPHONE (OUTPATIENT)
Dept: SURGERY | Age: 84
End: 2019-02-20

## 2019-03-05 ENCOUNTER — OFFICE VISIT (OUTPATIENT)
Dept: SURGERY | Age: 84
End: 2019-03-05

## 2019-03-05 DIAGNOSIS — Z51.89 VISIT FOR WOUND CHECK: Primary | ICD-10-CM

## 2019-03-05 PROCEDURE — 99024 POSTOP FOLLOW-UP VISIT: CPT | Performed by: DERMATOLOGY

## 2019-03-11 ENCOUNTER — OFFICE VISIT (OUTPATIENT)
Dept: ORTHOPEDIC SURGERY | Age: 84
End: 2019-03-11
Payer: MEDICARE

## 2019-03-11 VITALS
HEIGHT: 66 IN | BODY MASS INDEX: 47.09 KG/M2 | WEIGHT: 293 LBS | DIASTOLIC BLOOD PRESSURE: 59 MMHG | SYSTOLIC BLOOD PRESSURE: 124 MMHG | HEART RATE: 68 BPM

## 2019-03-11 DIAGNOSIS — G89.4 CHRONIC PAIN SYNDROME: Primary | ICD-10-CM

## 2019-03-11 DIAGNOSIS — M48.062 SPINAL STENOSIS OF LUMBAR REGION WITH NEUROGENIC CLAUDICATION: ICD-10-CM

## 2019-03-11 DIAGNOSIS — M51.36 DDD (DEGENERATIVE DISC DISEASE), LUMBAR: ICD-10-CM

## 2019-03-11 PROCEDURE — G8484 FLU IMMUNIZE NO ADMIN: HCPCS | Performed by: PHYSICIAN ASSISTANT

## 2019-03-11 PROCEDURE — 1090F PRES/ABSN URINE INCON ASSESS: CPT | Performed by: PHYSICIAN ASSISTANT

## 2019-03-11 PROCEDURE — 4040F PNEUMOC VAC/ADMIN/RCVD: CPT | Performed by: PHYSICIAN ASSISTANT

## 2019-03-11 PROCEDURE — G8417 CALC BMI ABV UP PARAM F/U: HCPCS | Performed by: PHYSICIAN ASSISTANT

## 2019-03-11 PROCEDURE — 1036F TOBACCO NON-USER: CPT | Performed by: PHYSICIAN ASSISTANT

## 2019-03-11 PROCEDURE — 99213 OFFICE O/P EST LOW 20 MIN: CPT | Performed by: PHYSICIAN ASSISTANT

## 2019-03-11 PROCEDURE — 1101F PT FALLS ASSESS-DOCD LE1/YR: CPT | Performed by: PHYSICIAN ASSISTANT

## 2019-03-11 PROCEDURE — 1123F ACP DISCUSS/DSCN MKR DOCD: CPT | Performed by: PHYSICIAN ASSISTANT

## 2019-03-11 PROCEDURE — G8427 DOCREV CUR MEDS BY ELIG CLIN: HCPCS | Performed by: PHYSICIAN ASSISTANT

## 2019-03-11 RX ORDER — TRAMADOL HYDROCHLORIDE 50 MG/1
50 TABLET ORAL
Qty: 150 TABLET | Refills: 2 | Status: SHIPPED | OUTPATIENT
Start: 2019-03-11 | End: 2019-04-10

## 2019-06-11 ENCOUNTER — OFFICE VISIT (OUTPATIENT)
Dept: ORTHOPEDIC SURGERY | Age: 84
End: 2019-06-11
Payer: MEDICARE

## 2019-06-11 VITALS
DIASTOLIC BLOOD PRESSURE: 74 MMHG | BODY MASS INDEX: 47.09 KG/M2 | WEIGHT: 293 LBS | HEART RATE: 68 BPM | HEIGHT: 66 IN | SYSTOLIC BLOOD PRESSURE: 134 MMHG

## 2019-06-11 DIAGNOSIS — M51.36 DDD (DEGENERATIVE DISC DISEASE), LUMBAR: ICD-10-CM

## 2019-06-11 DIAGNOSIS — G89.4 CHRONIC PAIN SYNDROME: Primary | ICD-10-CM

## 2019-06-11 DIAGNOSIS — M54.16 LUMBAR RADICULITIS: ICD-10-CM

## 2019-06-11 PROCEDURE — 1090F PRES/ABSN URINE INCON ASSESS: CPT | Performed by: PHYSICIAN ASSISTANT

## 2019-06-11 PROCEDURE — G8427 DOCREV CUR MEDS BY ELIG CLIN: HCPCS | Performed by: PHYSICIAN ASSISTANT

## 2019-06-11 PROCEDURE — 99213 OFFICE O/P EST LOW 20 MIN: CPT | Performed by: PHYSICIAN ASSISTANT

## 2019-06-11 PROCEDURE — 4040F PNEUMOC VAC/ADMIN/RCVD: CPT | Performed by: PHYSICIAN ASSISTANT

## 2019-06-11 PROCEDURE — 1036F TOBACCO NON-USER: CPT | Performed by: PHYSICIAN ASSISTANT

## 2019-06-11 PROCEDURE — 1123F ACP DISCUSS/DSCN MKR DOCD: CPT | Performed by: PHYSICIAN ASSISTANT

## 2019-06-11 PROCEDURE — G8417 CALC BMI ABV UP PARAM F/U: HCPCS | Performed by: PHYSICIAN ASSISTANT

## 2019-06-11 RX ORDER — TRAMADOL HYDROCHLORIDE 50 MG/1
50 TABLET ORAL
Qty: 150 TABLET | Refills: 2 | Status: SHIPPED | OUTPATIENT
Start: 2019-06-11 | End: 2019-09-10 | Stop reason: SDUPTHER

## 2019-06-11 NOTE — PROGRESS NOTES
Follow up: SPINE    CHIEF COMPLAINT:    Chief Complaint   Patient presents with    Back Pain       HISTORY OF PRESENT ILLNESS:                The patient is a 80 y.o. female here to follow up medication maintenance for chronic midline axial aching low back pain. Symptoms are constant but worsened with any walking standing or activity. Relief with resting. She still reports good improvement with tramadol 50mg I po q4-6 PRN (max 5 tablets w/out side effects). She states this allows her to be more functional. She is able to transition and ambulate short distances with walker. Pain at this time is chronic and stable. She is able to sleep 6hrs/night. She still denies any lower extremity radiating pain. Denies progressive numbness tingling or weakness. No recent bowel or bladder changes. Symptoms overall stable. She continues to do acupuncture every 2 weeks with benefit as well. Currently seeing Dr. Aracelis carpenter for bilateral end-stage knee OA. She states her PCP has mentioned possibly considering stem cell treatment with Dr. Acosta Roberts. Current/Past Treatment:   · Physical Therapy: Yes, prior bracing w/out relief   · Chiropractic: Acupuncture   · Injection: ESIs, GTB--both w/limited relief   · Medications: Tramadol 50mg max 5 tablets/day--no side effects; hypotension with Percocet, codeine or morphine                                             Surgery/Consult: no   Function-Does the pain medication improve your ability to do:   · Personal care: Yes  · Housework: Yes   · Physical activity: Yes  · Social activity: Yes  ·   Pain Scale: 1-10  With Meds: 5-6/10   Pain Scale: 1-10 Without Meds: 10+    Potential aberrant drug-related behavior:  · Aberrant behavior identified? NO  · Potential aberrant behavior identified? NO  · Reports loss are stolen prescriptions? NO  · Insist on certain medications by name? NO  · Purposeful oversedation? NO  · Increased dose without authorization?  NO    Pain f/u information:  · MED: 25  · Last UDS: 9-2018    · Pain contract/ORT: 3-11-19  Objective Goals: Physically and mentally function, carry on ADLs and achieve quality of life     Rationale for medication choice and dosage: To improve physical functionality, perform ADLS and achieve quality of life. Side Effects: Denies    Past Medical History: Medical history form was reviewd today & scanned into the Media tab  Past Medical History:   Diagnosis Date    Arthritis     Basal cell carcinoma of left eyelid 2/19/2019    Bladder leak     Cellulitis     Chronic back pain     Chronic diastolic CHF (congestive heart failure) (HCC)     [place] [facility], follows Dia Medrano NP    CKD (chronic kidney disease)     Depression     HTN (hypertension) 12/1/2017    Tinnitus     Wears glasses         REVIEW OF SYSTEMS:   CONSTITUTIONAL: Denies unexplained weight loss, fevers, chills or fatigue  NEUROLOGIC: Denies tremors or seizures         PHYSICAL EXAM:    Vitals: Blood pressure 134/74, pulse 68, height 5' 6.14\" (1.68 m), weight (!) 315 lb 0.6 oz (142.9 kg). GENERAL EXAM:  · General Apparence: Morbid obesity. Patient is adequately groomed with no evidence of malnutrition. · Orientation: The patient is oriented to time, place and person. · Mood & Affect:The patient's mood and affect are appropriate  · Lymphatic: Chronic bilateral LE Edema, non-tender to palpation  · Sensation: Sensation is intact without deficit     LUMBAR/SACRAL EXAMINATION:  · Inspection: Local inspection shows no step-off or bruising. Kyphosis   · Palpation:   No evidence of tenderness at the midline. · Range of Motion:  Able to sit forward flex without pain. Extension not assessed. · Strength:   Strength testing is 5/5 in all muscle groups tested. · Special Tests:   Straight leg raise and crossed SLR negative. Leg length and pelvis level.  0 out of 5 Asim's signs. · Skin: There are no rashes, ulcerations or lesions.   · Reflexes: Reflexes are

## 2019-07-11 ENCOUNTER — OFFICE VISIT (OUTPATIENT)
Dept: ORTHOPEDIC SURGERY | Age: 84
End: 2019-07-11
Payer: MEDICARE

## 2019-07-11 DIAGNOSIS — M17.11 PRIMARY OSTEOARTHRITIS OF RIGHT KNEE: Primary | ICD-10-CM

## 2019-07-11 PROCEDURE — 99999 PR OFFICE/OUTPT VISIT,PROCEDURE ONLY: CPT | Performed by: ORTHOPAEDIC SURGERY

## 2019-07-11 PROCEDURE — 20611 DRAIN/INJ JOINT/BURSA W/US: CPT | Performed by: ORTHOPAEDIC SURGERY

## 2019-07-18 ENCOUNTER — NURSE ONLY (OUTPATIENT)
Dept: ORTHOPEDIC SURGERY | Age: 84
End: 2019-07-18
Payer: MEDICARE

## 2019-07-18 DIAGNOSIS — M17.11 PRIMARY OSTEOARTHRITIS OF RIGHT KNEE: Primary | ICD-10-CM

## 2019-07-18 PROCEDURE — 20611 DRAIN/INJ JOINT/BURSA W/US: CPT | Performed by: PHYSICIAN ASSISTANT

## 2019-07-18 NOTE — PROGRESS NOTES
Diagnosis: Right knee osteoarthritis     Procedure: Right knee Visco supplementation injection #2     The patient returns today for their second Euflexxa injection in the right knee. The risks, benefits, and complications of the injections were again discussed in detail with the patient. The risks discussed included but are not limited to infection, skin reactions, hot swollen joints, and anaphylaxis. The patient gave verbal informed consent for the injection. The patient skin was prepped with 3 sterile gauze pads soaked with alcohol solution and the knee joint was injected with 2 mL of Euflexxa intra-articularly under sterile conditions . Technique: Under sterile conditions a [employer] ultrasound unit with a variable frequency (6.0-15.0 MHz) linear transducer was used to localize the placement of a 22-gauge needle into the brooke joint. Findings: Successful needle placement for intra-articular Visco supplementation injection. Final images were taken and saved for permanent record. The patient tolerated the injection reasonably well. The patient was given instructions to ice the the knee and avoid strenuous activities for 24-48 hours. The patient was instructed to call the office immediately if there is increased pain, redness, warmth, fever, or chills. We will see the patient back in one week for the third injection.

## 2019-07-25 ENCOUNTER — OFFICE VISIT (OUTPATIENT)
Dept: ORTHOPEDIC SURGERY | Age: 84
End: 2019-07-25
Payer: MEDICARE

## 2019-07-25 DIAGNOSIS — M17.11 PRIMARY OSTEOARTHRITIS OF RIGHT KNEE: Primary | ICD-10-CM

## 2019-07-25 PROCEDURE — 99999 PR OFFICE/OUTPT VISIT,PROCEDURE ONLY: CPT | Performed by: ORTHOPAEDIC SURGERY

## 2019-09-10 ENCOUNTER — OFFICE VISIT (OUTPATIENT)
Dept: ORTHOPEDIC SURGERY | Age: 84
End: 2019-09-10
Payer: MEDICARE

## 2019-09-10 VITALS
BODY MASS INDEX: 47.09 KG/M2 | SYSTOLIC BLOOD PRESSURE: 137 MMHG | HEART RATE: 70 BPM | WEIGHT: 293 LBS | HEIGHT: 66 IN | DIASTOLIC BLOOD PRESSURE: 67 MMHG

## 2019-09-10 DIAGNOSIS — M54.16 LUMBAR RADICULITIS: ICD-10-CM

## 2019-09-10 DIAGNOSIS — G89.4 CHRONIC PAIN SYNDROME: Primary | ICD-10-CM

## 2019-09-10 DIAGNOSIS — M48.062 SPINAL STENOSIS OF LUMBAR REGION WITH NEUROGENIC CLAUDICATION: ICD-10-CM

## 2019-09-10 DIAGNOSIS — M51.36 DDD (DEGENERATIVE DISC DISEASE), LUMBAR: ICD-10-CM

## 2019-09-10 PROCEDURE — 1090F PRES/ABSN URINE INCON ASSESS: CPT | Performed by: PHYSICIAN ASSISTANT

## 2019-09-10 PROCEDURE — 1123F ACP DISCUSS/DSCN MKR DOCD: CPT | Performed by: PHYSICIAN ASSISTANT

## 2019-09-10 PROCEDURE — 1036F TOBACCO NON-USER: CPT | Performed by: PHYSICIAN ASSISTANT

## 2019-09-10 PROCEDURE — 99213 OFFICE O/P EST LOW 20 MIN: CPT | Performed by: PHYSICIAN ASSISTANT

## 2019-09-10 PROCEDURE — G8417 CALC BMI ABV UP PARAM F/U: HCPCS | Performed by: PHYSICIAN ASSISTANT

## 2019-09-10 PROCEDURE — 4040F PNEUMOC VAC/ADMIN/RCVD: CPT | Performed by: PHYSICIAN ASSISTANT

## 2019-09-10 PROCEDURE — G8427 DOCREV CUR MEDS BY ELIG CLIN: HCPCS | Performed by: PHYSICIAN ASSISTANT

## 2019-09-10 RX ORDER — TRAMADOL HYDROCHLORIDE 50 MG/1
50 TABLET ORAL
Qty: 150 TABLET | Refills: 2 | Status: SHIPPED | OUTPATIENT
Start: 2019-09-10 | End: 2019-12-11 | Stop reason: SDUPTHER

## 2019-10-30 ENCOUNTER — NURSE ONLY (OUTPATIENT)
Dept: ORTHOPEDIC SURGERY | Age: 84
End: 2019-10-30
Payer: MEDICARE

## 2019-10-30 DIAGNOSIS — M17.11 PRIMARY OSTEOARTHRITIS OF RIGHT KNEE: Primary | ICD-10-CM

## 2019-10-30 RX ORDER — BETAMETHASONE SODIUM PHOSPHATE AND BETAMETHASONE ACETATE 3; 3 MG/ML; MG/ML
12 INJECTION, SUSPENSION INTRA-ARTICULAR; INTRALESIONAL; INTRAMUSCULAR; SOFT TISSUE ONCE
Status: COMPLETED | OUTPATIENT
Start: 2019-10-30 | End: 2019-10-30

## 2019-10-30 RX ADMIN — BETAMETHASONE SODIUM PHOSPHATE AND BETAMETHASONE ACETATE 12 MG: 3; 3 INJECTION, SUSPENSION INTRA-ARTICULAR; INTRALESIONAL; INTRAMUSCULAR; SOFT TISSUE at 15:45

## 2019-11-05 ENCOUNTER — TELEPHONE (OUTPATIENT)
Dept: ORTHOPEDIC SURGERY | Age: 84
End: 2019-11-05

## 2019-12-11 ENCOUNTER — OFFICE VISIT (OUTPATIENT)
Dept: ORTHOPEDIC SURGERY | Age: 84
End: 2019-12-11
Payer: MEDICARE

## 2019-12-11 VITALS — HEIGHT: 66 IN | WEIGHT: 293 LBS | BODY MASS INDEX: 47.09 KG/M2

## 2019-12-11 DIAGNOSIS — M51.36 DDD (DEGENERATIVE DISC DISEASE), LUMBAR: ICD-10-CM

## 2019-12-11 DIAGNOSIS — M54.16 LUMBAR RADICULITIS: ICD-10-CM

## 2019-12-11 DIAGNOSIS — M48.062 SPINAL STENOSIS OF LUMBAR REGION WITH NEUROGENIC CLAUDICATION: Primary | ICD-10-CM

## 2019-12-11 DIAGNOSIS — G89.4 CHRONIC PAIN SYNDROME: ICD-10-CM

## 2019-12-11 PROCEDURE — 4040F PNEUMOC VAC/ADMIN/RCVD: CPT | Performed by: PHYSICAL MEDICINE & REHABILITATION

## 2019-12-11 PROCEDURE — G8417 CALC BMI ABV UP PARAM F/U: HCPCS | Performed by: PHYSICAL MEDICINE & REHABILITATION

## 2019-12-11 PROCEDURE — 99213 OFFICE O/P EST LOW 20 MIN: CPT | Performed by: PHYSICAL MEDICINE & REHABILITATION

## 2019-12-11 PROCEDURE — 1036F TOBACCO NON-USER: CPT | Performed by: PHYSICAL MEDICINE & REHABILITATION

## 2019-12-11 PROCEDURE — G8484 FLU IMMUNIZE NO ADMIN: HCPCS | Performed by: PHYSICAL MEDICINE & REHABILITATION

## 2019-12-11 PROCEDURE — 1123F ACP DISCUSS/DSCN MKR DOCD: CPT | Performed by: PHYSICAL MEDICINE & REHABILITATION

## 2019-12-11 PROCEDURE — 1090F PRES/ABSN URINE INCON ASSESS: CPT | Performed by: PHYSICAL MEDICINE & REHABILITATION

## 2019-12-11 PROCEDURE — G8427 DOCREV CUR MEDS BY ELIG CLIN: HCPCS | Performed by: PHYSICAL MEDICINE & REHABILITATION

## 2019-12-11 RX ORDER — TRAMADOL HYDROCHLORIDE 50 MG/1
50 TABLET ORAL
Qty: 150 TABLET | Refills: 2 | Status: SHIPPED | OUTPATIENT
Start: 2019-12-11 | End: 2020-04-21 | Stop reason: SDUPTHER

## 2020-01-30 ENCOUNTER — NURSE ONLY (OUTPATIENT)
Dept: ORTHOPEDIC SURGERY | Age: 85
End: 2020-01-30
Payer: MEDICARE

## 2020-01-30 RX ORDER — HYALURONATE SODIUM 10 MG/ML
20 SYRINGE (ML) INTRAARTICULAR ONCE
Status: COMPLETED | OUTPATIENT
Start: 2020-01-30 | End: 2020-01-30

## 2020-01-30 RX ADMIN — Medication 20 MG: at 15:59

## 2020-01-30 NOTE — PROGRESS NOTES
Administrations This Visit     sodium hyaluronate (viscosup) injection 20 mg     Admin Date  01/30/2020  15:59 Action  Given Dose  20 mg Route  Intra-articular Site  Knee Right Administered By   JACQUELYN Sal    Ordering Provider:  JACQUELYN Sal    NDC:  [address]-6    Lot#:  [id number]    :  Andres Dotson    Patient Supplied?:  No

## 2020-02-06 ENCOUNTER — NURSE ONLY (OUTPATIENT)
Dept: ORTHOPEDIC SURGERY | Age: 85
End: 2020-02-06
Payer: MEDICARE

## 2020-02-06 RX ORDER — HYALURONATE SODIUM 10 MG/ML
20 SYRINGE (ML) INTRAARTICULAR ONCE
Status: COMPLETED | OUTPATIENT
Start: 2020-02-06 | End: 2020-02-06

## 2020-02-06 RX ORDER — BETAMETHASONE SODIUM PHOSPHATE AND BETAMETHASONE ACETATE 3; 3 MG/ML; MG/ML
12 INJECTION, SUSPENSION INTRA-ARTICULAR; INTRALESIONAL; INTRAMUSCULAR; SOFT TISSUE ONCE
Status: COMPLETED | OUTPATIENT
Start: 2020-02-06 | End: 2020-02-06

## 2020-02-06 RX ADMIN — Medication 20 MG: at 16:58

## 2020-02-06 RX ADMIN — BETAMETHASONE SODIUM PHOSPHATE AND BETAMETHASONE ACETATE 12 MG: 3; 3 INJECTION, SUSPENSION INTRA-ARTICULAR; INTRALESIONAL; INTRAMUSCULAR; SOFT TISSUE at 17:00

## 2020-02-06 NOTE — PROGRESS NOTES
Administrations This Visit     betamethasone acetate-betamethasone sodium phosphate (CELESTONE) injection 12 mg     Admin Date  02/06/2020  17:00 Action  Given Dose  12 mg Route  Intra-articular Site  Knee Left Administered By  Glenn Polanco PA-C    Ordering Provider:  JACQUELYN Sutton    NDC:  6137-6870-18    Lot#:  [id number]    :  [employer]    Patient Supplied?:  No          sodium hyaluronate (viscosup) injection 20 mg     Admin Date  02/06/2020  16:58 Action  Given Dose  20 mg Route  Intra-articular Site  Knee Right Administered By  Glenn Polanco PA-C    Ordering Provider:  JACQUELYN Sutton    [facility]:  [id number]    Lot#:  [id number]    :  Angeli Jennifer    Patient Supplied?:  No             Bupivacaine 250mg/50mL  3 cc  Lot # [id number]  Exp  02/01/2021  NDC# 7056-3460-44

## 2020-02-06 NOTE — PROGRESS NOTES
Diagnosis: Left and right knee osteoarthritis     Procedure: Right knee Visco supplementation injection #2     The patient returns today for their second Euflexxa injection in the right knee. The risks, benefits, and complications of the injections were again discussed in detail with the patient. The risks discussed included but are not limited to infection, skin reactions, hot swollen joints, and anaphylaxis. The patient gave verbal informed consent for the injection. The patient skin was prepped with 3 sterile gauze pads soaked with alcohol solution and the knee joint was injected with 2 mL of Euflexxa intra-articularly under sterile conditions . Technique: Under sterile conditions a [employer] ultrasound unit with a variable frequency (6.0-15.0 MHz) linear transducer was used to localize the placement of a 22-gauge needle into the brooke joint. Findings: Successful needle placement for intra-articular Visco supplementation injection. Final images were taken and saved for permanent record. The patient tolerated the injection reasonably well. The patient was given instructions to ice the the knee and avoid strenuous activities for 24-48 hours. The patient was instructed to call the office immediately if there is increased pain, redness, warmth, fever, or chills. We will see the patient back in one week for the third injection. Procedure: Left knee cortisone injection    I discussed in detail the risks, benefits and complications of aninjection which included but are not limited to infection, skin reactions, hot swollen joint, and anaphylaxis with the patient. The patient verbalized understanding and gave informed consent for the left knee injection. The patient's knee was slightly flexed with a bolster underneath the knee and the skin prepped using Betadine or sterile alcohol solution.  A sterile 22-gauge needle was inserted into the knee and the mixture of 2ml Beta-Beta, 3 mL of 0.5% bupivacaine was

## 2020-02-13 ENCOUNTER — NURSE ONLY (OUTPATIENT)
Dept: ORTHOPEDIC SURGERY | Age: 85
End: 2020-02-13
Payer: MEDICARE

## 2020-02-13 RX ORDER — HYALURONATE SODIUM 10 MG/ML
20 SYRINGE (ML) INTRAARTICULAR ONCE
Status: COMPLETED | OUTPATIENT
Start: 2020-02-13 | End: 2020-02-13

## 2020-02-13 RX ADMIN — Medication 20 MG: at 15:56

## 2020-03-16 ENCOUNTER — TELEPHONE (OUTPATIENT)
Dept: ORTHOPEDIC SURGERY | Age: 85
End: 2020-03-16

## 2020-04-21 ENCOUNTER — VIRTUAL VISIT (OUTPATIENT)
Dept: ORTHOPEDIC SURGERY | Age: 85
End: 2020-04-21
Payer: MEDICARE

## 2020-04-21 PROCEDURE — 99441 PR PHYS/QHP TELEPHONE EVALUATION 5-10 MIN: CPT | Performed by: PHYSICIAN ASSISTANT

## 2020-04-21 RX ORDER — TRAMADOL HYDROCHLORIDE 50 MG/1
50 TABLET ORAL
Qty: 150 TABLET | Refills: 2 | Status: SHIPPED | OUTPATIENT
Start: 2020-04-21 | End: 2020-05-21

## 2020-04-21 NOTE — PROGRESS NOTES
TELEPHONE VISIT: SPINE    CHIEF COMPLAINT:      Patient provided verbal consent to proceed with telephone visit; aware he/she may receive a bill for this telephone service, depending on insurance coverage. HISTORY OF PRESENT ILLNESS:                The patient is a 80 y.o. female consent granted for telephone visit with daughter Ilda Pitts [employer] [facility]). Patient has chronic midline axial aching low back pain. Symptoms are constant but worsened with any walking standing or activity. Relief with resting. She did have a flare up of her back pain last month which improved with acupuncture at her home. She still reports good improvement with tramadol 50mg I po q4-6 PRN (max 5 tablets w/out side effects). She states this allows her to be more functional---able to transition and ambulate short distances with walker. She is able to sleep >6hrs/night. She still denies any lower extremity radiating pain. Denies progressive numbness tingling or weakness. No recent bowel or bladder changes. No recent fevers, chills infections. Currently seeing Dr. Rahat carpenter for bilateral end-stage knee OA.      Current/Past Treatment:   · Physical Therapy: Yes, prior bracing w/out relief   · Chiropractic: Acupuncture   · Injection: ESIs, GTB--both w/limited relief   · Medications: Tramadol 50mg max 5 tablets/day--no side effects; hypotension with Percocet, codeine or morphine                                             Surgery/Consult: no      Function-Does the pain medication improve your ability to do:   ? Personal care: Yes  ? Housework: Yes   ? Physical activity: Yes  ? Social activity: Yes  ?    Pain Scale: 1-10  With Meds: 7/10   Pain Scale: 1-10 Without Meds: 10+     Potential aberrant drug-related behavior:  ? Aberrant behavior identified? NO  ? Potential aberrant behavior identified? NO  ? Reports loss are stolen prescriptions? NO  ? Insist on certain medications by name? NO  ? Purposeful oversedation? NO  ?  Increased dose without multilevel DDD, facet arthropathy and underlying scoliosis               Impression:   1) Chronic mechanical back pain  2) Severe lumbar stenosis, lumbar DDD   3) Opioid maintenance, low risk ORT=1  4) Prior hospitalization for  traumatic rhabdomyolysis          Plan:    1) Tramadol 50mg I po q4-6 PRN max 5/day, 2 refills. She has taken this medication chronically with Celexa without side effects. We did previously discuss risks of this; her PCP is aware she is taking both medications     2) Dr. Christian Scanlon previously discussed options of medial branch blocks and radiofrequency neurotomy. She cannot lay flat for an updated MRI. We also discussed his limitations. She wishes to proceed she will need an updated lumbar x-ray at the minimum    3) F/u 3mo    Present during telephone call: Kristyn Fair, [facility]    Total time spent on medical discussion/telephone visit:  9min 23sec    The risks and use of maintenance opiate medication were reviewed. These include the risk of tolerance, addition, and abuse. Potential side effects were also discussed. Informed verbal consent was obtained. Medications are to be taken as prescribed and not escalated without prior agreement. OAEDGARS/MARISABEL reviewed & appropriate. Opiate medications will only be prescribed through the office of LEBRON Olivares unless notified otherwise         Goals of the current treatment regimen include: improvement in pain, improvement in overall in physical performance, ability to perform daily activities, work or disability, emotional distress, health care utilization and decreased medication consumption. Progress will be monitored towards achieving/maintaining therapeutic goals:    1. Improving perceived interference of pain with ADL's, ability to perform HEP, continue to improve in overall flexibility, ROM, strength and endurance, ability to do household activities indoor and/or outdoor, work and social/leisure activities.  Improve

## 2020-05-14 ENCOUNTER — NURSE ONLY (OUTPATIENT)
Dept: ORTHOPEDIC SURGERY | Age: 85
End: 2020-05-14
Payer: MEDICARE

## 2020-05-14 RX ORDER — BUPIVACAINE HYDROCHLORIDE 5 MG/ML
30 INJECTION, SOLUTION PERINEURAL ONCE
Status: COMPLETED | OUTPATIENT
Start: 2020-05-14 | End: 2020-05-14

## 2020-05-14 RX ORDER — BETAMETHASONE SODIUM PHOSPHATE AND BETAMETHASONE ACETATE 3; 3 MG/ML; MG/ML
12 INJECTION, SUSPENSION INTRA-ARTICULAR; INTRALESIONAL; INTRAMUSCULAR; SOFT TISSUE ONCE
Status: COMPLETED | OUTPATIENT
Start: 2020-05-14 | End: 2020-05-14

## 2020-05-14 RX ADMIN — BUPIVACAINE HYDROCHLORIDE 150 MG: 5 INJECTION, SOLUTION PERINEURAL at 16:33

## 2020-05-14 RX ADMIN — BETAMETHASONE SODIUM PHOSPHATE AND BETAMETHASONE ACETATE 12 MG: 3; 3 INJECTION, SUSPENSION INTRA-ARTICULAR; INTRALESIONAL; INTRAMUSCULAR; SOFT TISSUE at 16:33

## 2020-05-14 RX ADMIN — BETAMETHASONE SODIUM PHOSPHATE AND BETAMETHASONE ACETATE 12 MG: 3; 3 INJECTION, SUSPENSION INTRA-ARTICULAR; INTRALESIONAL; INTRAMUSCULAR; SOFT TISSUE at 16:25

## 2020-05-14 RX ADMIN — BUPIVACAINE HYDROCHLORIDE 150 MG: 5 INJECTION, SOLUTION PERINEURAL at 16:25

## 2020-05-14 NOTE — PROGRESS NOTES
Diagnosis: Left and right knee osteoarthritis    Procedure: Left and right knee cortisone injection    I discussed in detail the risks, benefits and complications of aninjection which included but are not limited to infection, skin reactions, hot swollen joint, and anaphylaxis with the patient. The patient verbalized understanding and gave informed consent for the left and right knee injection. The patient's knee was slightly flexed with a bolster underneath the knee and the skin prepped using Betadine or sterile alcohol solution. A sterile 22-gauge needle was inserted into the knee and the mixture of 2ml Beta-Beta, 3 mL of 0.5% bupivacaine was injected under sterile technique. The needle was withdrawn and the puncture site sealed with a Band-Aid. Technique: Under sterile conditions a [employer] ultrasound unit with a variable frequency (6.0-15.0 MHz) linear transducer was used to localize the placement of a 22-gauge needle into the knee joint. Findings: Successful needle placement for knee injection. Final images were taken and saved for permanent record. The patient tolerated the injection well. The patient was instructed to call the office immediately if there is any pain, redness, warmth, fever, or chills.

## 2020-06-18 ENCOUNTER — NURSE ONLY (OUTPATIENT)
Dept: ORTHOPEDIC SURGERY | Age: 85
End: 2020-06-18
Payer: MEDICARE

## 2020-06-18 RX ORDER — HYALURONATE SODIUM 10 MG/ML
20 SYRINGE (ML) INTRAARTICULAR ONCE
Status: COMPLETED | OUTPATIENT
Start: 2020-06-18 | End: 2020-06-18

## 2020-06-18 RX ADMIN — Medication 20 MG: at 16:05

## 2020-06-18 NOTE — PROGRESS NOTES
Diagnosis: Left Knee osteoarthritis    Procedure:LEFT Visco supplementation injection #1    The patient is symptomatic from osteoarthritis of the LEFT knee joint with documented radiological signs of arthritis. The patient has also failed 3 months of conservative treatment including home exercise, education, Tylenol and/or NSAIDs use. The patient was offered a Visco supplementation today. Risks, benefits, and alternatives to the injections were discussed in detail with the patient. The risks discussed included but are not limited to infection, skin reactions, hot swollen joints, and anaphylaxis. The patient gave verbal informed consent for the injection. The patient's skin was prepped with  3 sterile gauze  pads soaked with alcohol solution and the knee joint was injected with 2 mL of Euflexxa intra-articularly under sterile conditions. Technique: Under sterile conditions a [employer] ultrasound unit with a variable frequency (6.0-15.0 MHz) linear transducer was used to localize the placement of a 22-gauge needle into the knee joint. Findings: Successful needle placement for intra-articular Visco supplementation injection. Final images were taken and saved for permanent record. The patient tolerated the injection reasonably well. The patient was given instructions to ice the kne and avoid strenuous activities for 24-48 hours. The patient was instructed to call the office immediately if there is increased pain, redness, warmth, fever, or chills. We will see the patient back in one week for their second injection.

## 2020-06-24 ENCOUNTER — OFFICE VISIT (OUTPATIENT)
Dept: ORTHOPEDIC SURGERY | Age: 85
End: 2020-06-24
Payer: MEDICARE

## 2020-06-24 RX ORDER — HYALURONATE SODIUM 10 MG/ML
20 SYRINGE (ML) INTRAARTICULAR ONCE
Status: COMPLETED | OUTPATIENT
Start: 2020-06-24 | End: 2020-06-24

## 2020-06-24 RX ADMIN — Medication 20 MG: at 15:08

## 2020-06-24 NOTE — PROGRESS NOTES
Diagnosis:  left knee osteoarthritis     Procedure: Left knee Visco supplementation injection #2     The patient returns today for their second Euflexxa injection in the left knee. The risks, benefits, and complications of the injections were again discussed in detail with the patient. The risks discussed included but are not limited to infection, skin reactions, hot swollen joints, and anaphylaxis. The patient gave verbal informed consent for the injection. The patient skin was prepped with 3 sterile gauze pads soaked with alcohol solution and the knee joint was injected with 2 mL of Euflexxa intra-articularly under sterile conditions . Technique: Under sterile conditions a [employer] ultrasound unit with a variable frequency (6.0-15.0 MHz) linear transducer was used to localize the placement of a 22-gauge needle into the brooke joint. Findings: Successful needle placement for intra-articular Visco supplementation injection. Final images were taken and saved for permanent record. The patient tolerated the injection reasonably well. The patient was given instructions to ice the the knee and avoid strenuous activities for 24-48 hours. The patient was instructed to call the office immediately if there is increased pain, redness, warmth, fever, or chills. We will see the patient back in one week for the third injection.

## 2020-07-01 ENCOUNTER — NURSE ONLY (OUTPATIENT)
Dept: ORTHOPEDIC SURGERY | Age: 85
End: 2020-07-01
Payer: MEDICARE

## 2020-07-01 RX ORDER — HYALURONATE SODIUM 10 MG/ML
20 SYRINGE (ML) INTRAARTICULAR ONCE
Status: COMPLETED | OUTPATIENT
Start: 2020-07-01 | End: 2020-07-01

## 2020-07-01 RX ADMIN — Medication 20 MG: at 09:21

## 2020-07-01 NOTE — PROGRESS NOTES
Diagnosis: Left knee osteoarthritis    Procedure: Left knee Visco supplementation injection #3    The patient returns today for their third and final Euflexxa injection in the left knee. The risks, benefits, and complications of the injections were again discussed in detail with the patient. The risks discussed include but are not limited to infection, skin reactions, hot swollen joints, and anaphylaxis. The patient gave verbal informed consent for the injection. The patient's skin was prepped with 3 sterile gauze pads soaked with alcohol solution and the knee joint was injected with 2 mL of Euflexxa intra-articularly under sterile conditions. Technique: Under sterile conditions a [employer] ultrasound unit with a variable frequency (6.0-15.0 MHz) linear transducer was used to localize the placement of a 22-gauge needle into the knee joint. Findings: Successful needle placement for intra-articular Visco supplementation injection. Final images were taken and saved for permanent record. The patient tolerated the injection reasonably well. The patient was given instructions to ice of the knee and avoid strenuous activity for 24-48 hours. The patient was instructed to call the office immediately if there is increased pain, redness, warmth, fever, or chills. We will see the patient back on an as-needed basis  from this point.

## 2020-07-07 ENCOUNTER — VIRTUAL VISIT (OUTPATIENT)
Dept: ORTHOPEDIC SURGERY | Age: 85
End: 2020-07-07
Payer: MEDICARE

## 2020-07-07 PROCEDURE — 99442 PR PHYS/QHP TELEPHONE EVALUATION 11-20 MIN: CPT | Performed by: PHYSICIAN ASSISTANT

## 2020-07-07 RX ORDER — TRAMADOL HYDROCHLORIDE 50 MG/1
50 TABLET ORAL
Qty: 150 TABLET | Refills: 2 | Status: SHIPPED | OUTPATIENT
Start: 2020-07-07 | End: 2020-08-06

## 2020-07-07 NOTE — PROGRESS NOTES
TELEPHONE VISIT: SPINE    CHIEF COMPLAINT:  Back pain     Patient provided verbal consent to proceed with telephone visit; aware he/she may receive a bill for this telephone service, depending on insurance coverage. HISTORY OF PRESENT ILLNESS:                The patient is a 80 y.o. female consent granted for telephone visit with Evgeny Rahmanradha and her daughter Domingo Carmona ([place]). Patient has chronic midline axial aching low back pain. Her back pain has been flared up the last 2 weeks without trauma. Her PCP has placed her on Prednisone for this flare up which is helping. Her symptoms are constant but worsened with any walking standing or activity. Relief with resting. She still reports some improvement with tramadol 50mg I po q4-6 PRN (max 5 tablets w/out side effects). She states this allows her to be more functional---able to transition and ambulate short distances with walker. She is able to sleep (broken up) >6hrs/night. She still denies any lower extremity radiating pain. Denies progressive numbness tingling or weakness. No recent bowel or bladder changes. No recent fevers, chills infections. Still getting acupuncture with relief     Currently seeing Dr. Gonzalo carpenter for bilateral end-stage knee OA.      Current/Past Treatment:   · Physical Therapy: Yes, prior bracing w/out relief   · Chiropractic: Acupuncture   · Injection: ESIs, GTB--both w/limited relief   · Medications: Tramadol 50mg max 5 tablets/day--no side effects; hypotension with Percocet, codeine or morphine                                             Surgery/Consult: no      Function-Does the pain medication improve your ability to do:   ? Personal care: Yes  ? Housework: Yes   ? Physical activity: Yes  ? Social activity: Yes  ?    Pain Scale: 1-10  With Meds: 7/10   Pain Scale: 1-10 Without Meds: 10+     Potential aberrant drug-related behavior:  ? Aberrant behavior identified? NO  ? Potential aberrant behavior identified? NO  ?  Reports loss are tablet, Rfl: 0    hydrALAZINE (APRESOLINE) 100 MG tablet, Take 1 tablet by mouth 3 times daily, Disp: 90 tablet, Rfl: 0    metolazone (ZAROXOLYN) 5 MG tablet, Take 5 mg by mouth Twice a Week, Disp: , Rfl:     predniSONE (DELTASONE) 10 MG tablet, Take 10 mg by mouth as needed Only when needed for inflammation on back- usually start with 10mg taper dose., Disp: , Rfl:     spironolactone (ALDACTONE) 25 MG tablet, Take 1 tablet by mouth daily, Disp: 30 tablet, Rfl: 3    furosemide (LASIX) 40 MG tablet, Take 1 tablet by mouth daily (Patient taking differently: Take 40 mg by mouth daily Every other day), Disp: 30 tablet, Rfl: 0    guanFACINE (TENEX) 1 MG tablet, Take 1 mg by mouth every evening , Disp: , Rfl:     Multiple Vitamins-Minerals (PRESERVISION/LUTEIN) CAPS, Take 1 capsule by mouth daily, Disp: , Rfl:     acetaminophen (TYLENOL) 500 MG tablet, Take 500 mg by mouth every 6 hours as needed for Pain For chronic back pain, Disp: , Rfl:     labetalol (NORMODYNE) 100 MG tablet, Take 100 mg by mouth 2 times daily , Disp: , Rfl:     aspirin 81 MG chewable tablet, Take 81 mg by mouth daily. , Disp: , Rfl:   Allergies:  Bee venom; Nutritional supplements; Oxycodone; Codeine; Hydrocodone-acetaminophen; Morphine; and Percocet [oxycodone-acetaminophen]  Social History:    reports that she has never smoked. She has never used smokeless tobacco. She reports that she does not drink alcohol or use drugs. Family History:   No family history on file. REVIEW OF SYSTEMS:   CONSTITUTIONAL: Denies unexplained weight loss, fevers, chills or fatigue  NEUROLOGIC: Denies tremors or seizures       Vitals: There were no vitals taken for this visit.     Diagnostic Testing:   UDS 9/10/2018 positive for BZO ([place])      Declining updated lumbar x-rays 3/13/2018     ORT=1     Cervical CT 2-2018: no acute findings, cervical spondylosis      UDS 9-5-17 + for BZO only ([place])      X-rays repeated today AP and lateral lumbar spine 2 views 3/1/17 show L2-3 DDD and retrolisthesis, multilevel DDD, no fracture     Lumbar MRI 12/03/2012 has shown severe lumbar stenosis L4-5 and L5-S1 with multilevel DDD, facet arthropathy and underlying scoliosis               Impression:   1) Chronic mechanical back pain--worse x2wks  2) Severe lumbar stenosis, lumbar DDD   3) Opioid maintenance, low risk ORT=1  4) Prior hospitalization for  traumatic rhabdomyolysis          Plan:    1) Tramadol 50mg I po q4-6 PRN max 5/day, 2 refills. She has taken this medication chronically with Celexa without side effects. We did previously discuss risks of this; her PCP is aware she is taking both medications     2) Dr. Heriberto Hart previously discussed options of medial branch blocks and radiofrequency neurotomy. She cannot lay flat for an updated MRI. We also discussed his limitations. She wishes to proceed she will need an updated lumbar x-ray at the [facility] discussed. 3) Will mail pain contract to be completed     4) F/u 3mo    Present during telephone call: Cadence Price [facility]    Total time spent on medical discussion/telephone visit:  12min    The risks and use of maintenance opiate medication were reviewed. These include the risk of tolerance, addition, and abuse. Potential side effects were also discussed. Informed verbal consent was obtained. Medications are to be taken as prescribed and not escalated without prior agreement. PINEDAS/MARISABEL reviewed & appropriate. Opiate medications will only be prescribed through the office of LEBRON Karimi unless notified otherwise         Goals of the current treatment regimen include: improvement in pain, improvement in overall in physical performance, ability to perform daily activities, work or disability, emotional distress, health care utilization and decreased medication consumption. Progress will be monitored towards achieving/maintaining therapeutic goals:    1.  Improving perceived interference of pain with ADL's, ability to perform HEP, continue to improve in overall flexibility, ROM, strength and endurance, ability to do household activities indoor and/or outdoor, work and social/leisure activities. Improve psychosocial and physical functioning. 2. Improving sleep to 6-7 hours a night. Improve mood/ anxiety and depression symptoms    3. Reduction of reliance on opioid analgesia/more appropriate opioid use. Utilization of adjuvant medications as appropriate. Risks and benefits of the medications and alternative treatments have been discussed with the patient. The patient was advised against drinking alcohol with the opioid pain medicines, advised against driving or handling machinery when starting or adjusting the dose of medicines, feeling groggy or drowsy, or if having any cognitive issues related to the current medications. The patient is fully aware of the risk of overdose and death, if medicines are misused and not taken as prescribed. Discussed patient's responsibility to safely store and appropriately dispose up medication. Prescription for Narcan offered. If the patient develops new symptoms or if the symptoms worsen, the patient was told to call the office.       Sherren Angelica [facility]

## 2020-08-17 ENCOUNTER — TELEPHONE (OUTPATIENT)
Dept: ORTHOPEDIC SURGERY | Age: 85
End: 2020-08-17

## 2020-08-27 ENCOUNTER — NURSE ONLY (OUTPATIENT)
Dept: ORTHOPEDIC SURGERY | Age: 85
End: 2020-08-27
Payer: MEDICARE

## 2020-08-27 RX ORDER — METHYLPREDNISOLONE ACETATE 40 MG/ML
40 INJECTION, SUSPENSION INTRA-ARTICULAR; INTRALESIONAL; INTRAMUSCULAR; SOFT TISSUE ONCE
Status: COMPLETED | OUTPATIENT
Start: 2020-08-27 | End: 2020-08-27

## 2020-08-27 RX ORDER — BUPIVACAINE HYDROCHLORIDE 5 MG/ML
30 INJECTION, SOLUTION PERINEURAL ONCE
Status: COMPLETED | OUTPATIENT
Start: 2020-08-27 | End: 2020-08-27

## 2020-08-27 RX ORDER — BETAMETHASONE SODIUM PHOSPHATE AND BETAMETHASONE ACETATE 3; 3 MG/ML; MG/ML
12 INJECTION, SUSPENSION INTRA-ARTICULAR; INTRALESIONAL; INTRAMUSCULAR; SOFT TISSUE ONCE
Status: COMPLETED | OUTPATIENT
Start: 2020-08-27 | End: 2020-08-27

## 2020-08-27 RX ORDER — HYALURONATE SODIUM 10 MG/ML
20 SYRINGE (ML) INTRAARTICULAR ONCE
Status: COMPLETED | OUTPATIENT
Start: 2020-08-27 | End: 2020-08-27

## 2020-08-27 RX ADMIN — Medication 20 MG: at 16:26

## 2020-08-27 RX ADMIN — BUPIVACAINE HYDROCHLORIDE 150 MG: 5 INJECTION, SOLUTION PERINEURAL at 16:35

## 2020-08-27 RX ADMIN — BETAMETHASONE SODIUM PHOSPHATE AND BETAMETHASONE ACETATE 12 MG: 3; 3 INJECTION, SUSPENSION INTRA-ARTICULAR; INTRALESIONAL; INTRAMUSCULAR; SOFT TISSUE at 16:34

## 2020-08-27 RX ADMIN — METHYLPREDNISOLONE ACETATE 40 MG: 40 INJECTION, SUSPENSION INTRA-ARTICULAR; INTRALESIONAL; INTRAMUSCULAR; SOFT TISSUE at 16:35

## 2020-08-27 NOTE — PROGRESS NOTES
Diagnosis: Right and left knee osteoarthritis    Procedure: Right Visco supplementation injection #1    The patient is symptomatic from osteoarthritis of the right knee joint with documented radiological signs of arthritis. The patient has also failed 3 months of conservative treatment including home exercise, education, Tylenol and/or NSAIDs use. The patient was offered a Visco supplementation today. Risks, benefits, and alternatives to the injections were discussed in detail with the patient. The risks discussed included but are not limited to infection, skin reactions, hot swollen joints, and anaphylaxis. The patient gave verbal informed consent for the injection. The patient's skin was prepped with  3 sterile gauze  pads soaked with alcohol solution and the knee joint was injected with 2 mL of percent albumin Euflexxa intra-articularly under sterile conditions. Technique: Under sterile conditions a [employer] ultrasound unit with a variable frequency (6.0-15.0 MHz) linear transducer was used to localize the placement of a 22-gauge needle into the knee joint. Findings: Successful needle placement for intra-articular Visco supplementation injection. Final images were taken and saved for permanent record. The patient tolerated the injection reasonably well. The patient was given instructions to ice the kne and avoid strenuous activities for 24-48 hours. The patient was instructed to call the office immediately if there is increased pain, redness, warmth, fever, or chills. We will see the patient back in one week for their second injection. Procedure: Right knee cortisone injection    I discussed in detail the risks, benefits and complications of aninjection which included but are not limited to infection, skin reactions, hot swollen joint, and anaphylaxis with the patient. The patient verbalized understanding and gave informed consent for the right 1 mL of Depo-Medrol 40 mg, knee injection.  The patient's knee was slightly flexed with a bolster underneath the knee and the skin prepped using Betadine or sterile alcohol solution. A sterile 22-gauge needle was inserted into the knee and the mixture of 2ml Beta-Beta, 3 mL of 0.5% bupivacaine was injected under sterile technique. The needle was withdrawn and the puncture site sealed with a Band-Aid. Technique: Under sterile conditions a [employer] ultrasound unit with a variable frequency (6.0-15.0 MHz) linear transducer was used to localize the placement of a 22-gauge needle into the knee joint. Findings: Successful needle placement for knee injection. Final images were taken and saved for permanent record. The patient tolerated the injection well. The patient was instructed to call the office immediately if there is any pain, redness, warmth, fever, or chills.

## 2020-09-03 ENCOUNTER — NURSE ONLY (OUTPATIENT)
Dept: ORTHOPEDIC SURGERY | Age: 85
End: 2020-09-03
Payer: MEDICARE

## 2020-09-03 RX ORDER — HYALURONATE SODIUM 10 MG/ML
20 SYRINGE (ML) INTRAARTICULAR ONCE
Status: COMPLETED | OUTPATIENT
Start: 2020-09-03 | End: 2020-09-03

## 2020-09-03 RX ADMIN — Medication 20 MG: at 16:23

## 2020-09-03 NOTE — PROGRESS NOTES
Diagnosis: Right knee osteoarthritis     Procedure: Right knee Visco supplementation injection #2     The patient returns today for their second Euflexxa injection in the right knee. The risks, benefits, and complications of the injections were again discussed in detail with the patient. The risks discussed included but are not limited to infection, skin reactions, hot swollen joints, and anaphylaxis. The patient gave verbal informed consent for the injection. The patient skin was prepped with 3 sterile gauze pads soaked with alcohol solution and the knee joint was injected with 2 mL of Euflexxa intra-articularly under sterile conditions . Technique: Under sterile conditions a [employer] ultrasound unit with a variable frequency (6.0-15.0 MHz) linear transducer was used to localize the placement of a 22-gauge needle into the brooke joint. Findings: Successful needle placement for intra-articular Visco supplementation injection. Final images were taken and saved for permanent record. The patient tolerated the injection reasonably well. The patient was given instructions to ice the the knee and avoid strenuous activities for 24-48 hours. The patient was instructed to call the office immediately if there is increased pain, redness, warmth, fever, or chills. We will see the patient back in one week for the third injection.

## 2020-09-10 ENCOUNTER — NURSE ONLY (OUTPATIENT)
Dept: ORTHOPEDIC SURGERY | Age: 85
End: 2020-09-10
Payer: MEDICARE

## 2020-09-10 RX ORDER — HYALURONATE SODIUM 10 MG/ML
20 SYRINGE (ML) INTRAARTICULAR ONCE
Status: COMPLETED | OUTPATIENT
Start: 2020-09-10 | End: 2020-09-10

## 2020-09-10 RX ADMIN — Medication 20 MG: at 16:28

## 2020-10-06 ENCOUNTER — VIRTUAL VISIT (OUTPATIENT)
Dept: ORTHOPEDIC SURGERY | Age: 85
End: 2020-10-06
Payer: MEDICARE

## 2020-10-06 PROCEDURE — 99442 PR PHYS/QHP TELEPHONE EVALUATION 11-20 MIN: CPT | Performed by: PHYSICIAN ASSISTANT

## 2020-10-06 NOTE — PROGRESS NOTES
10+     Potential aberrant drug-related behavior:  ? Aberrant behavior identified? NO  ? Potential aberrant behavior identified? NO  ? Reports loss are stolen prescriptions? NO  ? Insist on certain medications by name? NO  ? Purposeful oversedation? NO  ? Increased dose without authorization? NO     Pain f/u information:  ? MED: 25  ? Last UDS: 9-2018    ? Pain contract/ORT: 3-11-19--did not get mailed contract  ? updated, ORT equals 1  ? Objective Goals: Physically and mentally function, carry on ADLs and achieve quality of life      Rationale for medication choice and dosage: To improve physical functionality, perform ADLS and achieve quality of life.     Side Effects: Denies    Medical history:  Past Medical History:   Diagnosis Date    Arthritis     Basal cell carcinoma of left eyelid 2/19/2019    Bladder leak     Cellulitis     Chronic back pain     Chronic diastolic CHF (congestive heart failure) (HCC)     see [place] [facility], follows Nawaf [facility] NP    CKD (chronic kidney disease)     Depression     HTN (hypertension) 12/1/2017    Tinnitus     Wears glasses        Past Surgical History:     Past Surgical History:   Procedure Laterality Date    APPENDECTOMY      BLADDER SUSPENSION  2005    BREAST SURGERY      cyst left breast    CATARACT REMOVAL      both eyes    CHOLECYSTECTOMY      HEEL SPUR SURGERY      left heel and cyst from top of foot    MOHS SURGERY  02/19/2019    L medial lower eyelid     Current Medications:     Current Outpatient Medications:     doxazosin (CARDURA) 2 MG tablet, Take 1 mg by mouth nightly, Disp: , Rfl:     diazepam (VALIUM) 2 MG tablet, Take 2 mg by mouth 2 times daily as needed for Anxiety. ., Disp: , Rfl:     isosorbide mononitrate (IMDUR) 30 MG extended release tablet, Take 1 tablet by mouth daily, Disp: 30 tablet, Rfl: 1    meclizine (ANTIVERT) 25 MG tablet, Take 1 tablet by mouth 3 times daily as needed for Dizziness, Disp: 15 tablet, Rfl: 0   citalopram (CELEXA) 10 MG tablet, Take 1 tablet by mouth daily, Disp: 30 tablet, Rfl: 0    atorvastatin (LIPITOR) 40 MG tablet, Take 1 tablet by mouth nightly, Disp: 30 tablet, Rfl: 0    hydrALAZINE (APRESOLINE) 100 MG tablet, Take 1 tablet by mouth 3 times daily, Disp: 90 tablet, Rfl: 0    metolazone (ZAROXOLYN) 5 MG tablet, Take 5 mg by mouth Twice a Week, Disp: , Rfl:     predniSONE (DELTASONE) 10 MG tablet, Take 10 mg by mouth as needed Only when needed for inflammation on back- usually start with 10mg taper dose., Disp: , Rfl:     spironolactone (ALDACTONE) 25 MG tablet, Take 1 tablet by mouth daily, Disp: 30 tablet, Rfl: 3    furosemide (LASIX) 40 MG tablet, Take 1 tablet by mouth daily (Patient taking differently: Take 40 mg by mouth daily Every other day), Disp: 30 tablet, Rfl: 0    guanFACINE (TENEX) 1 MG tablet, Take 1 mg by mouth every evening , Disp: , Rfl:     Multiple Vitamins-Minerals (PRESERVISION/LUTEIN) CAPS, Take 1 capsule by mouth daily, Disp: , Rfl:     acetaminophen (TYLENOL) 500 MG tablet, Take 500 mg by mouth every 6 hours as needed for Pain For chronic back pain, Disp: , Rfl:     labetalol (NORMODYNE) 100 MG tablet, Take 100 mg by mouth 2 times daily , Disp: , Rfl:     aspirin 81 MG chewable tablet, Take 81 mg by mouth daily. , Disp: , Rfl:   Allergies:  Bee venom; Nutritional supplements; Oxycodone; Codeine; Hydrocodone-acetaminophen; Morphine; and Percocet [oxycodone-acetaminophen]  Social History:    reports that she has never smoked. She has never used smokeless tobacco. She reports that she does not drink alcohol or use drugs. Family History:   History reviewed. No pertinent family history. REVIEW OF SYSTEMS:   CONSTITUTIONAL: Denies unexplained weight loss, fevers, chills or fatigue  NEUROLOGIC: Denies tremors or seizures       Vitals:  height 5' 6.14\" (1.68 m), weight (!) 315 lb 0.6 oz (142.9 kg).     Diagnostic Testing:   OARRs reviewed showing cannabis Gummies prescribed 8-2020    UDS 9/10/2018 positive for BZO (Valium)      Declining updated lumbar x-rays 3/13/2018     ORT=1     Cervical CT 2-2018: no acute findings, cervical spondylosis      UDS 9-5-17 + for BZO only (Valium)      X-rays repeated today AP and lateral lumbar spine 2 views 3/1/17 show L2-3 DDD and retrolisthesis, multilevel DDD, no fracture     Lumbar MRI 12/03/2012 has shown severe lumbar stenosis L4-5 and L5-S1 with multilevel DDD, facet arthropathy and underlying scoliosis               Impression:   1) Chronic mechanical back pain--stable   2) Severe lumbar stenosis, lumbar DDD   3) Opioid maintenance, low risk ORT=1  4) Prior hospitalization for  traumatic rhabdomyolysis  5) Cannabis tx--Dr. Sushma Fish         Plan:    1) We had a very long discussion. I informed the patient and her daughter (after reviewing with [facility]) that if she wishes to continue tramadol with us she must d/c cannabis treatment. We discussed the goal of cannabis treatment is for her to come off of pain medication. Her daughter states she wishes to discuss this with her PCP. They we provided our direct line and may call with her decision. Total time spent on medical discussion/telephone visit:  19min    The risks and use of maintenance opiate medication were reviewed. These include the risk of tolerance, addition, and abuse. Potential side effects were also discussed. Informed verbal consent was obtained. Medications are to be taken as prescribed and not escalated without prior agreement. OAEDGARS/MARISABEL reviewed & appropriate. Opiate medications will only be prescribed through the office of LEBRON Bergeron unless notified otherwise         Goals of the current treatment regimen include: improvement in pain, improvement in overall in physical performance, ability to perform daily activities, work or disability, emotional distress, health care utilization and decreased medication consumption.      Progress will be monitored towards achieving/maintaining therapeutic goals:    1. Improving perceived interference of pain with ADL's, ability to perform HEP, continue to improve in overall flexibility, ROM, strength and endurance, ability to do household activities indoor and/or outdoor, work and social/leisure activities. Improve psychosocial and physical functioning. 2. Improving sleep to 6-7 hours a night. Improve mood/ anxiety and depression symptoms    3. Reduction of reliance on opioid analgesia/more appropriate opioid use. Utilization of adjuvant medications as appropriate. Risks and benefits of the medications and alternative treatments have been discussed with the patient. The patient was advised against drinking alcohol with the opioid pain medicines, advised against driving or handling machinery when starting or adjusting the dose of medicines, feeling groggy or drowsy, or if having any cognitive issues related to the current medications. The patient is fully aware of the risk of overdose and death, if medicines are misused and not taken as prescribed. Discussed patient's responsibility to safely store and appropriately dispose up medication. Prescription for Narcan offered. If the patient develops new symptoms or if the symptoms worsen, the patient was told to call the office.       Volodymyr Ordonez [facility]

## 2020-12-03 ENCOUNTER — TELEPHONE (OUTPATIENT)
Dept: ORTHOPEDIC SURGERY | Age: 85
End: 2020-12-03

## 2020-12-03 NOTE — TELEPHONE ENCOUNTER
General Question     Subject: QUESTION FOR WILMA SOTO  Patient and /or Facility Request: Bonnie Hanks  Contact Number: [phone]

## 2020-12-04 ENCOUNTER — TELEPHONE (OUTPATIENT)
Dept: ORTHOPEDIC SURGERY | Age: 85
End: 2020-12-04

## 2021-06-15 ENCOUNTER — OFFICE VISIT (OUTPATIENT)
Dept: DERMATOLOGY | Age: 86
End: 2021-06-15
Payer: MEDICARE

## 2021-06-15 VITALS — TEMPERATURE: 98.8 F

## 2021-06-15 DIAGNOSIS — L57.8 DIFFUSE PHOTODAMAGE OF SKIN: ICD-10-CM

## 2021-06-15 DIAGNOSIS — Z85.828 HISTORY OF BASAL CELL CARCINOMA: ICD-10-CM

## 2021-06-15 DIAGNOSIS — D48.5 NEOPLASM OF UNCERTAIN BEHAVIOR OF SKIN: Primary | ICD-10-CM

## 2021-06-15 PROCEDURE — 11104 PUNCH BX SKIN SINGLE LESION: CPT | Performed by: DERMATOLOGY

## 2021-06-15 PROCEDURE — 1090F PRES/ABSN URINE INCON ASSESS: CPT | Performed by: DERMATOLOGY

## 2021-06-15 PROCEDURE — G8421 BMI NOT CALCULATED: HCPCS | Performed by: DERMATOLOGY

## 2021-06-15 PROCEDURE — 1123F ACP DISCUSS/DSCN MKR DOCD: CPT | Performed by: DERMATOLOGY

## 2021-06-15 PROCEDURE — 4040F PNEUMOC VAC/ADMIN/RCVD: CPT | Performed by: DERMATOLOGY

## 2021-06-15 PROCEDURE — G8427 DOCREV CUR MEDS BY ELIG CLIN: HCPCS | Performed by: DERMATOLOGY

## 2021-06-15 PROCEDURE — 1036F TOBACCO NON-USER: CPT | Performed by: DERMATOLOGY

## 2021-06-15 PROCEDURE — 99212 OFFICE O/P EST SF 10 MIN: CPT | Performed by: DERMATOLOGY

## 2021-06-15 RX ORDER — TRAMADOL HYDROCHLORIDE 50 MG/1
50 TABLET ORAL EVERY 6 HOURS PRN
COMMUNITY

## 2021-06-15 RX ORDER — TORSEMIDE 20 MG/1
20 TABLET ORAL DAILY
COMMUNITY

## 2021-06-15 RX ORDER — DULOXETIN HYDROCHLORIDE 30 MG/1
30 CAPSULE, DELAYED RELEASE ORAL DAILY
COMMUNITY

## 2021-06-15 RX ORDER — GABAPENTIN 100 MG/1
100 CAPSULE ORAL 3 TIMES DAILY
COMMUNITY

## 2021-06-15 RX ORDER — ALLOPURINOL 100 MG/1
100 TABLET ORAL DAILY
COMMUNITY

## 2021-06-15 RX ORDER — NYSTATIN 100000 U/G
CREAM TOPICAL 2 TIMES DAILY
COMMUNITY

## 2021-06-15 RX ORDER — RIVAROXABAN 10 MG/1
10 TABLET, FILM COATED ORAL
COMMUNITY

## 2021-06-15 RX ORDER — TIZANIDINE 2 MG/1
2 TABLET ORAL EVERY 6 HOURS PRN
COMMUNITY

## 2021-06-15 NOTE — PROGRESS NOTES
[facility]) Dermatology  Frandy Lee M.D.  [phone]       Steffanie Higgins  3/16/1930    80 y.o. female     Date of Visit: 6/15/2021    Chief Complaint:   Chief Complaint   Patient presents with    Skin Lesion     left shoudler         I was asked to see this patient by Dr. Puri ref. provider found. History of Present Illness:  1. New erythematous and pigmented plaque left superior shoulder. Noticed this after her second Covid vaccine-lesion has scabbed, then healed. Nontender, not pruritic    Patient does have a prior history of basal cell carcinoma left medial lower eyelid status post Mohs      Review of Systems:  Constitutional: Reports general sense of well-being       Past Medical History, Surgical History, Family History, Medications and Allergies reviewed. Social History:   Social History     Socioeconomic History    Marital status:      Spouse name: Not on file    Number of children: Not on file    Years of education: Not on file    Highest education level: Not on file   Occupational History    Not on file   Tobacco Use    Smoking status: Never Smoker    Smokeless tobacco: Never Used   Vaping Use    Vaping Use: Never used   Substance and Sexual Activity    Alcohol use: No    Drug use: No    Sexual activity: Not on file   Other Topics Concern    Not on file   Social History Narrative    Not on file     Social Determinants of Health     Financial Resource Strain:     Difficulty of Paying Living Expenses:    Food Insecurity:     Worried About Running Out of Food in the Last Year:     [address] in the Last Year:    Transportation Needs:     Lack of Transportation (Medical):      Lack of Transportation (Non-Medical):    Physical Activity:     Days of Exercise per Week:     Minutes of Exercise per Session:    Stress:     Feeling of Stress :    Social Connections:     Frequency of Communication with Friends and Family:     Frequency of Social Gatherings with Friends and Family:     Attends Judaism Services:     Active Member of Clubs or Organizations:     Attends Club or Organization Meetings:     Marital Status:    Intimate Partner Violence:     Fear of Current or Ex-Partner:     Emotionally Abused:     Physically Abused:     Sexually Abused:        Physical Examination       -General: Well-appearing, NAD  1. Background freckling and lentigines left upper arm  Left superior shoulder 1.9 cm erythematous, gray, hyperpigmented plaque          Assessment and Plan     1. Neoplasm of uncertain behavior of skin-left superior shoulder rule out pigmented basal cell carcinoma, malignant melanoma--Discussed possible diagnosis. Patient agreeable to biopsy. Verbal consent obtained after risks (infection, bleeding, scar), benefits and alternatives explained. -Area(s) to be biopsied were marked with a surgical pen. Site(s) were cleansed with alcohol. Local anesthesia achieved with 1% lidocaine with epinephrine/sodium bicarbonate. 4mm punch biopsy was performed followed by placement of simple interrupted 4-0 nylon sutures. Specimen(s) sent to pathology. The wound(s) were dressed with petrolatum and covered with a bandage. Pt was educated re: risk of bleeding, infection, scar, wound care instructions and suture removal.   Sutures to be removed in 2 weeks at [facility]   2. Diffuse photodamage of skin - Discussed changes in skin from chronic UV exposure and ways to mitigate further damage- hats when outside, SPF 50 or higher that is reapplied regularly, daily SPF application, UV protective clothing, and sun avoidance.      3. History of basal cell carcinoma -monitor for new or changing lesions

## 2021-06-18 LAB — DERMATOLOGY PATHOLOGY REPORT: ABNORMAL

## 2021-06-24 ENCOUNTER — TELEPHONE (OUTPATIENT)
Dept: DERMATOLOGY | Age: 86
End: 2021-06-24

## 2021-06-24 NOTE — TELEPHONE ENCOUNTER
Spoke with daughter Ramila Souza and gave results and scheduled excision for 7-27-21 arriving at 3:30 in [facility]

## 2021-07-27 ENCOUNTER — PROCEDURE VISIT (OUTPATIENT)
Dept: DERMATOLOGY | Age: 86
End: 2021-07-27
Payer: MEDICARE

## 2021-07-27 VITALS
BODY MASS INDEX: 45.21 KG/M2 | SYSTOLIC BLOOD PRESSURE: 133 MMHG | DIASTOLIC BLOOD PRESSURE: 73 MMHG | TEMPERATURE: 97.9 F | WEIGHT: 281.3 LBS

## 2021-07-27 DIAGNOSIS — C44.619 BASAL CELL CARCINOMA (BCC) OF LEFT SHOULDER: Primary | ICD-10-CM

## 2021-07-27 PROCEDURE — 11602 EXC TR-EXT MAL+MARG 1.1-2 CM: CPT | Performed by: DERMATOLOGY

## 2021-07-27 PROCEDURE — 12032 INTMD RPR S/A/T/EXT 2.6-7.5: CPT | Performed by: DERMATOLOGY

## 2021-07-28 ENCOUNTER — TELEPHONE (OUTPATIENT)
Dept: DERMATOLOGY | Age: 86
End: 2021-07-28

## 2021-07-28 NOTE — TELEPHONE ENCOUNTER
Dr Adame Bound patient  Pt daughter c/b #[phone]  Pt daughter stated  Her mom stays at The [address] [place] [address] and they need instructions faxed to their office regarding general information about patient procedure she had done they will like to know what to do and what not to do regarding the excision and how to clean it.  Fax #[phone]

## 2021-07-29 LAB — DERMATOLOGY PATHOLOGY REPORT: ABNORMAL

## 2021-07-29 NOTE — TELEPHONE ENCOUNTER
ERI and informed daughter Mick Cleveland that all requested info was faxed, and also left wound care review as well for her. . Call with any questions or concerns.

## 2022-11-26 ENCOUNTER — HOSPITAL ENCOUNTER (INPATIENT)
Age: 87
LOS: 8 days | Discharge: NURSING, REHAB OR LONG-TERM CARE | DRG: 689 | End: 2022-12-07
Attending: INTERNAL MEDICINE | Admitting: INTERNAL MEDICINE
Payer: MEDICARE

## 2022-11-26 ENCOUNTER — APPOINTMENT (OUTPATIENT)
Dept: CT IMAGING | Age: 87
DRG: 689 | End: 2022-11-26
Payer: MEDICARE

## 2022-11-26 DIAGNOSIS — F03.911 DEMENTIA WITH AGITATION, UNSPECIFIED DEMENTIA SEVERITY, UNSPECIFIED DEMENTIA TYPE: ICD-10-CM

## 2022-11-26 DIAGNOSIS — G89.29 OTHER CHRONIC PAIN: ICD-10-CM

## 2022-11-26 DIAGNOSIS — N17.9 AKI (ACUTE KIDNEY INJURY) (HCC): ICD-10-CM

## 2022-11-26 DIAGNOSIS — K59.00 CONSTIPATION, UNSPECIFIED CONSTIPATION TYPE: Primary | ICD-10-CM

## 2022-11-26 PROBLEM — R79.89 ELEVATED SERUM CREATININE: Status: ACTIVE | Noted: 2022-11-26

## 2022-11-26 LAB
A/G RATIO: 1 (ref 1.1–2.2)
ALBUMIN SERPL-MCNC: 3.6 G/DL (ref 3.4–5)
ALP BLD-CCNC: 166 U/L (ref 40–129)
ALT SERPL-CCNC: 20 U/L (ref 10–40)
ANION GAP SERPL CALCULATED.3IONS-SCNC: 16 MMOL/L (ref 3–16)
AST SERPL-CCNC: 30 U/L (ref 15–37)
BACTERIA: ABNORMAL /HPF
BASOPHILS ABSOLUTE: 0 K/UL (ref 0–0.2)
BASOPHILS RELATIVE PERCENT: 0.5 %
BILIRUB SERPL-MCNC: 0.8 MG/DL (ref 0–1)
BILIRUBIN URINE: NEGATIVE
BLOOD, URINE: ABNORMAL
BUN BLDV-MCNC: 34 MG/DL (ref 7–20)
CALCIUM SERPL-MCNC: 9.5 MG/DL (ref 8.3–10.6)
CHLORIDE BLD-SCNC: 96 MMOL/L (ref 99–110)
CLARITY: ABNORMAL
CO2: 24 MMOL/L (ref 21–32)
COLOR: YELLOW
CREAT SERPL-MCNC: 2.5 MG/DL (ref 0.6–1.2)
EOSINOPHILS ABSOLUTE: 0 K/UL (ref 0–0.6)
EOSINOPHILS RELATIVE PERCENT: 0.4 %
EPITHELIAL CELLS, UA: ABNORMAL /HPF (ref 0–5)
GFR SERPL CREATININE-BSD FRML MDRD: 18 ML/MIN/{1.73_M2}
GLUCOSE BLD-MCNC: 115 MG/DL (ref 70–99)
GLUCOSE URINE: NEGATIVE MG/DL
HCT VFR BLD CALC: 41.3 % (ref 36–48)
HEMOGLOBIN: 13.5 G/DL (ref 12–16)
HYALINE CASTS: ABNORMAL /LPF (ref 0–2)
KETONES, URINE: NEGATIVE MG/DL
LEUKOCYTE ESTERASE, URINE: ABNORMAL
LIPASE: 104 U/L (ref 13–60)
LYMPHOCYTES ABSOLUTE: 1.1 K/UL (ref 1–5.1)
LYMPHOCYTES RELATIVE PERCENT: 13.2 %
MCH RBC QN AUTO: 30.5 PG (ref 26–34)
MCHC RBC AUTO-ENTMCNC: 32.6 G/DL (ref 31–36)
MCV RBC AUTO: 93.4 FL (ref 80–100)
MICROSCOPIC EXAMINATION: YES
MONOCYTES ABSOLUTE: 0.4 K/UL (ref 0–1.3)
MONOCYTES RELATIVE PERCENT: 4.9 %
MUCUS: ABNORMAL /LPF
NEUTROPHILS ABSOLUTE: 6.9 K/UL (ref 1.7–7.7)
NEUTROPHILS RELATIVE PERCENT: 81 %
NITRITE, URINE: NEGATIVE
PDW BLD-RTO: 13.9 % (ref 12.4–15.4)
PH UA: 5.5 (ref 5–8)
PLATELET # BLD: 244 K/UL (ref 135–450)
PMV BLD AUTO: 8.3 FL (ref 5–10.5)
POTASSIUM SERPL-SCNC: 3.9 MMOL/L (ref 3.5–5.1)
PROTEIN UA: NEGATIVE MG/DL
RBC # BLD: 4.42 M/UL (ref 4–5.2)
RBC UA: ABNORMAL /HPF (ref 0–4)
SODIUM BLD-SCNC: 136 MMOL/L (ref 136–145)
SPECIFIC GRAVITY UA: 1.02 (ref 1–1.03)
TOTAL PROTEIN: 7.3 G/DL (ref 6.4–8.2)
TROPONIN: 0.06 NG/ML
URINE REFLEX TO CULTURE: YES
URINE TYPE: ABNORMAL
UROBILINOGEN, URINE: 0.2 E.U./DL
WBC # BLD: 8.6 K/UL (ref 4–11)
WBC UA: >100 /HPF (ref 0–5)

## 2022-11-26 PROCEDURE — 96375 TX/PRO/DX INJ NEW DRUG ADDON: CPT

## 2022-11-26 PROCEDURE — 80053 COMPREHEN METABOLIC PANEL: CPT

## 2022-11-26 PROCEDURE — 87086 URINE CULTURE/COLONY COUNT: CPT

## 2022-11-26 PROCEDURE — G0378 HOSPITAL OBSERVATION PER HR: HCPCS

## 2022-11-26 PROCEDURE — 96374 THER/PROPH/DIAG INJ IV PUSH: CPT

## 2022-11-26 PROCEDURE — 6370000000 HC RX 637 (ALT 250 FOR IP): Performed by: NURSE PRACTITIONER

## 2022-11-26 PROCEDURE — 83690 ASSAY OF LIPASE: CPT

## 2022-11-26 PROCEDURE — 6360000002 HC RX W HCPCS: Performed by: PHYSICIAN ASSISTANT

## 2022-11-26 PROCEDURE — 87181 SC STD AGAR DILUTION PER AGT: CPT

## 2022-11-26 PROCEDURE — 85025 COMPLETE CBC W/AUTO DIFF WBC: CPT

## 2022-11-26 PROCEDURE — 96361 HYDRATE IV INFUSION ADD-ON: CPT

## 2022-11-26 PROCEDURE — 87077 CULTURE AEROBIC IDENTIFY: CPT

## 2022-11-26 PROCEDURE — 87186 SC STD MICRODIL/AGAR DIL: CPT

## 2022-11-26 PROCEDURE — 84484 ASSAY OF TROPONIN QUANT: CPT

## 2022-11-26 PROCEDURE — 99285 EMERGENCY DEPT VISIT HI MDM: CPT

## 2022-11-26 PROCEDURE — 74176 CT ABD & PELVIS W/O CONTRAST: CPT

## 2022-11-26 PROCEDURE — 2580000003 HC RX 258: Performed by: PHYSICIAN ASSISTANT

## 2022-11-26 PROCEDURE — 6370000000 HC RX 637 (ALT 250 FOR IP): Performed by: PHYSICIAN ASSISTANT

## 2022-11-26 PROCEDURE — 2580000003 HC RX 258: Performed by: NURSE PRACTITIONER

## 2022-11-26 PROCEDURE — 81001 URINALYSIS AUTO W/SCOPE: CPT

## 2022-11-26 RX ORDER — ACETAMINOPHEN 325 MG/1
650 TABLET ORAL EVERY 6 HOURS PRN
Status: DISCONTINUED | OUTPATIENT
Start: 2022-11-26 | End: 2022-12-07 | Stop reason: HOSPADM

## 2022-11-26 RX ORDER — HYDRALAZINE HYDROCHLORIDE 50 MG/1
100 TABLET, FILM COATED ORAL 3 TIMES DAILY
Status: DISCONTINUED | OUTPATIENT
Start: 2022-11-27 | End: 2022-11-27

## 2022-11-26 RX ORDER — ASPIRIN 81 MG/1
81 TABLET, CHEWABLE ORAL DAILY
Status: DISCONTINUED | OUTPATIENT
Start: 2022-11-27 | End: 2022-12-07 | Stop reason: HOSPADM

## 2022-11-26 RX ORDER — DULOXETIN HYDROCHLORIDE 30 MG/1
30 CAPSULE, DELAYED RELEASE ORAL DAILY
Status: DISCONTINUED | OUTPATIENT
Start: 2022-11-27 | End: 2022-12-07 | Stop reason: HOSPADM

## 2022-11-26 RX ORDER — SPIRONOLACTONE 25 MG/1
25 TABLET ORAL DAILY
Status: DISCONTINUED | OUTPATIENT
Start: 2022-11-27 | End: 2022-11-28

## 2022-11-26 RX ORDER — SODIUM CHLORIDE 0.9 % (FLUSH) 0.9 %
5-40 SYRINGE (ML) INJECTION PRN
Status: DISCONTINUED | OUTPATIENT
Start: 2022-11-26 | End: 2022-12-07 | Stop reason: HOSPADM

## 2022-11-26 RX ORDER — DICYCLOMINE HCL 20 MG
20 TABLET ORAL 3 TIMES DAILY PRN
Status: DISCONTINUED | OUTPATIENT
Start: 2022-11-26 | End: 2022-12-07 | Stop reason: HOSPADM

## 2022-11-26 RX ORDER — SODIUM CHLORIDE 0.9 % (FLUSH) 0.9 %
5-40 SYRINGE (ML) INJECTION EVERY 12 HOURS SCHEDULED
Status: DISCONTINUED | OUTPATIENT
Start: 2022-11-26 | End: 2022-12-07 | Stop reason: HOSPADM

## 2022-11-26 RX ORDER — DOXAZOSIN 2 MG/1
1 TABLET ORAL NIGHTLY
Status: DISCONTINUED | OUTPATIENT
Start: 2022-11-26 | End: 2022-12-07 | Stop reason: HOSPADM

## 2022-11-26 RX ORDER — ONDANSETRON 2 MG/ML
4 INJECTION INTRAMUSCULAR; INTRAVENOUS EVERY 6 HOURS PRN
Status: DISCONTINUED | OUTPATIENT
Start: 2022-11-26 | End: 2022-12-07 | Stop reason: HOSPADM

## 2022-11-26 RX ORDER — NYSTATIN 100000 U/G
CREAM TOPICAL 2 TIMES DAILY
Status: DISCONTINUED | OUTPATIENT
Start: 2022-11-26 | End: 2022-12-07 | Stop reason: HOSPADM

## 2022-11-26 RX ORDER — ALLOPURINOL 100 MG/1
100 TABLET ORAL DAILY
Status: DISCONTINUED | OUTPATIENT
Start: 2022-11-27 | End: 2022-12-07 | Stop reason: HOSPADM

## 2022-11-26 RX ORDER — DIAZEPAM 2 MG/1
2 TABLET ORAL 2 TIMES DAILY PRN
Status: DISCONTINUED | OUTPATIENT
Start: 2022-11-26 | End: 2022-11-27

## 2022-11-26 RX ORDER — ACETAMINOPHEN 650 MG/1
650 SUPPOSITORY RECTAL EVERY 6 HOURS PRN
Status: DISCONTINUED | OUTPATIENT
Start: 2022-11-26 | End: 2022-12-07 | Stop reason: HOSPADM

## 2022-11-26 RX ORDER — ISOSORBIDE MONONITRATE 30 MG/1
30 TABLET, EXTENDED RELEASE ORAL DAILY
Status: DISCONTINUED | OUTPATIENT
Start: 2022-11-27 | End: 2022-12-07 | Stop reason: HOSPADM

## 2022-11-26 RX ORDER — GUANFACINE 1 MG/1
1 TABLET ORAL EVERY EVENING
Status: DISCONTINUED | OUTPATIENT
Start: 2022-11-26 | End: 2022-12-07 | Stop reason: HOSPADM

## 2022-11-26 RX ORDER — CITALOPRAM 20 MG/1
10 TABLET ORAL DAILY
Status: DISCONTINUED | OUTPATIENT
Start: 2022-11-27 | End: 2022-12-07 | Stop reason: HOSPADM

## 2022-11-26 RX ORDER — GABAPENTIN 100 MG/1
100 CAPSULE ORAL 3 TIMES DAILY
Status: DISCONTINUED | OUTPATIENT
Start: 2022-11-26 | End: 2022-11-28

## 2022-11-26 RX ORDER — ATORVASTATIN CALCIUM 40 MG/1
40 TABLET, FILM COATED ORAL NIGHTLY
Status: DISCONTINUED | OUTPATIENT
Start: 2022-11-26 | End: 2022-12-07 | Stop reason: HOSPADM

## 2022-11-26 RX ORDER — SODIUM CHLORIDE 9 MG/ML
INJECTION, SOLUTION INTRAVENOUS PRN
Status: DISCONTINUED | OUTPATIENT
Start: 2022-11-26 | End: 2022-12-07 | Stop reason: HOSPADM

## 2022-11-26 RX ORDER — FUROSEMIDE 40 MG/1
40 TABLET ORAL DAILY
Status: DISCONTINUED | OUTPATIENT
Start: 2022-11-27 | End: 2022-12-02

## 2022-11-26 RX ORDER — LABETALOL 100 MG/1
100 TABLET, FILM COATED ORAL 2 TIMES DAILY
Status: DISCONTINUED | OUTPATIENT
Start: 2022-11-26 | End: 2022-11-27

## 2022-11-26 RX ORDER — MORPHINE SULFATE 4 MG/ML
4 INJECTION, SOLUTION INTRAMUSCULAR; INTRAVENOUS ONCE
Status: COMPLETED | OUTPATIENT
Start: 2022-11-26 | End: 2022-11-26

## 2022-11-26 RX ORDER — ONDANSETRON 4 MG/1
4 TABLET, ORALLY DISINTEGRATING ORAL EVERY 8 HOURS PRN
Status: DISCONTINUED | OUTPATIENT
Start: 2022-11-26 | End: 2022-12-07 | Stop reason: HOSPADM

## 2022-11-26 RX ORDER — 0.9 % SODIUM CHLORIDE 0.9 %
1000 INTRAVENOUS SOLUTION INTRAVENOUS ONCE
Status: COMPLETED | OUTPATIENT
Start: 2022-11-26 | End: 2022-11-26

## 2022-11-26 RX ORDER — ONDANSETRON 2 MG/ML
4 INJECTION INTRAMUSCULAR; INTRAVENOUS ONCE
Status: COMPLETED | OUTPATIENT
Start: 2022-11-26 | End: 2022-11-26

## 2022-11-26 RX ORDER — SODIUM CHLORIDE 9 MG/ML
INJECTION, SOLUTION INTRAVENOUS ONCE
Status: COMPLETED | OUTPATIENT
Start: 2022-11-26 | End: 2022-11-27

## 2022-11-26 RX ADMIN — SODIUM CHLORIDE 1000 ML: 9 INJECTION, SOLUTION INTRAVENOUS at 16:14

## 2022-11-26 RX ADMIN — MORPHINE SULFATE 4 MG: 4 INJECTION, SOLUTION INTRAMUSCULAR; INTRAVENOUS at 16:13

## 2022-11-26 RX ADMIN — Medication 330 ML: at 20:37

## 2022-11-26 RX ADMIN — SODIUM CHLORIDE, PRESERVATIVE FREE 10 ML: 5 INJECTION INTRAVENOUS at 21:00

## 2022-11-26 RX ADMIN — ONDANSETRON 4 MG: 2 INJECTION INTRAMUSCULAR; INTRAVENOUS at 16:15

## 2022-11-26 RX ADMIN — NYSTATIN: 100000 CREAM TOPICAL at 23:55

## 2022-11-26 ASSESSMENT — PAIN SCALES - GENERAL
PAINLEVEL_OUTOF10: 10
PAINLEVEL_OUTOF10: 0

## 2022-11-26 NOTE — ED PROVIDER NOTES
[facility] Emergency Department    CHIEF COMPLAINT  Abdominal Pain (Patient states she has not been able to use the restroom for 3 days and stomach hurts. Patient is tearful at this time. ), Palpitations (The Faustino Banegas reported to daughter that heart rate was 48 BPM), and Fall (Patient placed back in bed after lunch and found on the floor as reported to daughter )      [facility] VISIT  Evaluated by JOEY. My supervising physician was available for consultation. EKG interpretation provided by attending physician. HISTORY OF PRESENT ILLNESS  Jeffy Pennington is a 80 y.o. female who presents to the ED complaining of several day history of constipation with 1 day history of abdominal pain. Patient with history of dementia. Accompanied by daughter who states that she is essentially mentating at baseline. Reports that she is currently being treated for urinary tract infection. During this has had decreased appetite. Patient reports that she has had some nausea. No vomiting. Reports being constipated although had episode of diarrhea earlier today. Denies fevers chills. Abdominal pain waxes and wanes. No other complaints, modifying factors or associated symptoms. Nursing notes reviewed.    Past Medical History:   Diagnosis Date    Arthritis     Basal cell carcinoma of left eyelid 2/19/2019    Bladder leak     Cellulitis     Chronic back pain     Chronic diastolic CHF (congestive heart failure) ([facility] [address]     see [place] [facility], follows Ankita Nelson NP    CKD (chronic kidney disease)     Depression     HTN (hypertension) 12/1/2017    Tinnitus     Wears glasses      Past Surgical History:   Procedure Laterality Date    APPENDECTOMY      BLADDER SUSPENSION  2005    BREAST SURGERY      cyst left breast    CATARACT REMOVAL      both eyes    CHOLECYSTECTOMY      HEEL SPUR SURGERY      left heel and cyst from top of foot    MOHS SURGERY  02/19/2019    L medial lower eyelid     No family history on file. Social History     Socioeconomic History    Marital status:      Spouse name: Not on file    Number of children: Not on file    Years of education: Not on file    Highest education level: Not on file   Occupational History    Not on file   Tobacco Use    Smoking status: Never    Smokeless tobacco: Never   Vaping Use    Vaping Use: Never used   Substance and Sexual Activity    Alcohol use: No    Drug use: No    Sexual activity: Not on file   Other Topics Concern    Not on file   Social History Narrative    Not on file     Social Determinants of Health     Financial Resource Strain: Not on file   Food Insecurity: Not on file   Transportation Needs: Not on file   Physical Activity: Not on file   Stress: Not on file   Social Connections: Not on file   Intimate Partner Violence: Not on file   Housing Stability: Not on file     No current facility-administered medications for this encounter. Current Outpatient Medications   Medication Sig Dispense Refill    allopurinol (ZYLOPRIM) 100 MG tablet Take 100 mg by mouth daily      rivaroxaban (XARELTO) 10 MG TABS tablet Take 10 mg by mouth      torsemide (DEMADEX) 20 MG tablet Take 20 mg by mouth daily      traMADol (ULTRAM) 50 MG tablet Take 50 mg by mouth every 6 hours as needed for Pain. tiZANidine (ZANAFLEX) 2 MG tablet Take 2 mg by mouth every 6 hours as needed      DULoxetine (CYMBALTA) 30 MG extended release capsule Take 30 mg by mouth daily      bisacodyl (DULCOLAX) 5 MG EC tablet Take 5 mg by mouth daily as needed for Constipation      Cholecalciferol (VITAMIN D3 PO) Take by mouth      Potassium (POTASSIMIN PO) Take by mouth      gabapentin (NEURONTIN) 100 MG capsule Take 100 mg by mouth 3 times daily. nystatin (MYCOSTATIN) 814626 UNIT/GM cream Apply topically 2 times daily Apply topically 2 times daily.       doxazosin (CARDURA) 2 MG tablet Take 1 mg by mouth nightly      diazepam (VALIUM) 2 MG tablet Take 2 mg by mouth 2 times daily as needed for Anxiety. .      isosorbide mononitrate (IMDUR) 30 MG extended release tablet Take 1 tablet by mouth daily 30 tablet 1    meclizine (ANTIVERT) 25 MG tablet Take 1 tablet by mouth 3 times daily as needed for Dizziness 15 tablet 0    citalopram (CELEXA) 10 MG tablet Take 1 tablet by mouth daily 30 tablet 0    atorvastatin (LIPITOR) 40 MG tablet Take 1 tablet by mouth nightly 30 tablet 0    hydrALAZINE (APRESOLINE) 100 MG tablet Take 1 tablet by mouth 3 times daily 90 tablet 0    metolazone (ZAROXOLYN) 5 MG tablet Take 5 mg by mouth Twice a Week      predniSONE (DELTASONE) 10 MG tablet Take 10 mg by mouth as needed Only when needed for inflammation on back- usually start with 10mg taper dose. spironolactone (ALDACTONE) 25 MG tablet Take 1 tablet by mouth daily 30 tablet 3    furosemide (LASIX) 40 MG tablet Take 1 tablet by mouth daily 30 tablet 0    guanFACINE (TENEX) 1 MG tablet Take 1 mg by mouth every evening       Multiple Vitamins-Minerals (PRESERVISION/LUTEIN) CAPS Take 1 capsule by mouth daily      acetaminophen (TYLENOL) 500 MG tablet Take 500 mg by mouth every 6 hours as needed for Pain For chronic back pain      labetalol (NORMODYNE) 100 MG tablet Take 100 mg by mouth 2 times daily       aspirin 81 MG chewable tablet Take 81 mg by mouth daily         Allergies   Allergen Reactions    Bee Venom Swelling    Nutritional Supplements Swelling    Oxycodone     Codeine Nausea And Vomiting     Severe B/P drop    Hydrocodone-Acetaminophen Nausea And Vomiting    Morphine Nausea And Vomiting     Severe drop in B/P    Percocet [Oxycodone-Acetaminophen] Nausea And Vomiting       REVIEW OF SYSTEMS  10 systems reviewed, pertinent positives per HPI otherwise noted to be negative    PHYSICAL EXAM  /82   Pulse (!) 111   Temp 97.2 °F (36.2 °C) (Oral)   Resp 25   SpO2 92%   GENERAL APPEARANCE: Awake and alert. Cooperative. No acute distress. HEAD: Normocephalic. Atraumatic.   EYES: PERRL. EOM's grossly intact. ENT: Mucous membranes are moist.   NECK: Supple. HEART: RRR. No murmurs. LUNGS: Respirations unlabored. CTAB. Good air exchange. Speaking comfortably in full sentences. ABDOMEN: Soft. Non-distended. Generalized nonfocal abdominal tenderness without rigidity, guarding or rebound. Negative Larios's, McBurney's, and Rovsing's. No fluid waves or ascites. No hernias or masses. Bowel sounds normal all quadrants. No CVA tenderness. EXTREMITIES: No peripheral edema. Moves all extremities equally. All extremities neurovascularly intact. SKIN: Warm and dry. No acute rashes. NEUROLOGICAL: Alert and oriented. CN's 2-12 intact. No gross facial drooping. Strength 5/5, sensation intact. PSYCHIATRIC: Normal mood and affect. RADIOLOGY  CT ABDOMEN PELVIS WO CONTRAST Additional Contrast? None    Result Date: 11/26/2022  EXAMINATION: CT OF THE ABDOMEN AND PELVIS WITHOUT CONTRAST 11/26/2022 5:40 pm TECHNIQUE: CT of the abdomen and pelvis was performed without the administration of intravenous contrast. Multiplanar reformatted images are provided for review. Automated exposure control, iterative reconstruction, and/or weight based adjustment of the mA/kV was utilized to reduce the radiation dose to as low as reasonably achievable. COMPARISON: 12/31/2014. HISTORY: ORDERING SYSTEM PROVIDED HISTORY: abd pain TECHNOLOGIST PROVIDED HISTORY: Reason for exam:->abd pain Additional Contrast?->None Decision Support Exception - unselect if not a suspected or confirmed emergency medical condition->Emergency Medical Condition (MA) Reason for Exam: abd pain ,constipation Relevant Medical/Surgical History: appendectomy,cholecystectomy,kidney disease FINDINGS: Lower Chest: The lung bases are clear. Note is made of cardiomegaly. Organs: The gallbladder has been surgically removed. The liver, spleen, pancreas and adrenal glands appear unremarkable for a non contrasted study.  The kidneys demonstrate no calcifications. No hydronephrosis is seen. No ureteral or bladder calculi are seen. GI/Bowel: Evaluation of the bowel is limited as no enteric contrast was given. No dilated loops of bowel are seen. There is a large amount of stool seen within the sigmoid colon and rectum. There is scattered diverticular disease involving the colon but no findings to suggest active inflammation. I do not see a dilated appendix. Pelvis: No pelvic masses or fluid collections are seen. Peritoneum/Retroperitoneum: The abdominal aorta is not aneurysmal.  There are shotty mesenteric and retroperitoneal lymph nodes but no lymphadenopathy is seen. Bones/Soft Tissues: No acute bony abnormalities are noted. There are degenerative changes involving the spine with multiple chronic appearing compression fractures at T10, T11 and T12.     1. Constipation with large amount of stool within the sigmoid colon and rectum. Otherwise, no acute intra-abdominal or pelvic abnormality with limitations for a noncontrast CT scan. 2. Diverticulosis without obvious inflammation. ED COURSE  Patient received morphine and Zofran IV for pain and nausea, with good relief. Triage vitals with slight tachycardia pulse rate 104. Remainder vitals are stable. CBC without leukocytosis or anemia. CMP with elevated BUN and creatinine suggestive of acute on chronic kidney injury at this time. Given a 1 L IV fluid bolus. Lipase of 104. CT of the abdomen and pelvis showing constipation with large amount of stool within sigmoid colon and rectum. Initiated on an enema. Urinalysis pending. Discussed recommendations for admission with hospital medicine and orders have been placed. A discussion was had with Ms. Maria regarding abdominal pain and constipation, ED findings and recommendations for admission. Risk management discussed and shared decision making had with patient and/or surrogate. All questions were answered. Patient in agreement.     MDM  Results for orders placed or performed during the hospital encounter of 11/26/22   CBC with Auto Differential   Result Value Ref Range    WBC 8.6 4.0 - 11.0 K/uL    RBC 4.42 4.00 - 5.20 M/uL    Hemoglobin 13.5 12.0 - 16.0 g/dL    Hematocrit 41.3 36.0 - 48.0 %    MCV 93.4 80.0 - 100.0 fL    MCH 30.5 26.0 - 34.0 pg    MCHC 32.6 31.0 - 36.0 g/dL    RDW 13.9 12.4 - 15.4 %    Platelets 281 239 - 036 K/uL    MPV 8.3 5.0 - 10.5 fL    Neutrophils % 81.0 %    Lymphocytes % 13.2 %    Monocytes % 4.9 %    Eosinophils % 0.4 %    Basophils % 0.5 %    Neutrophils Absolute 6.9 1.7 - 7.7 K/uL    Lymphocytes Absolute 1.1 1.0 - 5.1 K/uL    Monocytes Absolute 0.4 0.0 - 1.3 K/uL    Eosinophils Absolute 0.0 0.0 - 0.6 K/uL    Basophils Absolute 0.0 0.0 - 0.2 K/uL   Comprehensive Metabolic Panel   Result Value Ref Range    Sodium 136 136 - 145 mmol/L    Potassium 3.9 3.5 - 5.1 mmol/L    Chloride 96 (L) 99 - 110 mmol/L    CO2 24 21 - 32 mmol/L    Anion Gap 16 3 - 16    Glucose 115 (H) 70 - 99 mg/dL    BUN 34 (H) 7 - 20 mg/dL    Creatinine 2.5 (H) 0.6 - 1.2 mg/dL    Est, Glom Filt Rate 18 (A) >60    Calcium 9.5 8.3 - 10.6 mg/dL    Total Protein 7.3 6.4 - 8.2 g/dL    Albumin 3.6 3.4 - 5.0 g/dL    Albumin/Globulin Ratio 1.0 (L) 1.1 - 2.2    Total Bilirubin 0.8 0.0 - 1.0 mg/dL    Alkaline Phosphatase 166 (H) 40 - 129 U/L    ALT 20 10 - 40 U/L    AST 30 15 - 37 U/L   Troponin   Result Value Ref Range    Troponin 0.06 (H) <0.01 ng/mL   Lipase   Result Value Ref Range    Lipase 104.0 (H) 13.0 - 60.0 U/L   Urinalysis with Reflex to Culture   Result Value Ref Range    Color, UA Yellow Straw/Yellow    Clarity, UA CLOUDY (A) Clear    Glucose, Ur Negative Negative mg/dL    Bilirubin Urine Negative Negative    Ketones, Urine Negative Negative mg/dL    Specific Gravity, UA 1.020 1.005 - 1.030    Blood, Urine TRACE-INTACT (A) Negative    pH, UA 5.5 5.0 - 8.0    Protein, UA Negative Negative mg/dL    Urobilinogen, Urine 0.2 <2.0 E.U./dL    Nitrite, Urine Negative Negative    Leukocyte Esterase, Urine LARGE (A) Negative    Microscopic Examination YES     Urine Type NotGiven     Urine Reflex to Culture Yes    Microscopic Urinalysis   Result Value Ref Range    Hyaline Casts, UA 3-5 (A) 0 - 2 /LPF    Mucus, UA 1+ (A) None Seen /LPF    WBC, UA >100 (A) 0 - 5 /HPF    RBC, UA 3-4 0 - 4 /HPF    Epithelial Cells, UA 0-1 0 - 5 /HPF    Bacteria, UA 2+ (A) None Seen /HPF   POCT Glucose   Result Value Ref Range    POC Glucose 115 (H) 70 - 99 mg/dl    Performed on ACCU-CHEK    EKG 12 Lead   Result Value Ref Range    Ventricular Rate 102 BPM    Atrial Rate 102 BPM    P-R Interval 262 ms    QRS Duration 84 ms    Q-T Interval 408 ms    QTc Calculation (Bazett) 531 ms    P Axis 52 degrees    R Axis -71 degrees    T Axis 103 degrees    Diagnosis       Sinus tachycardia with 1st degree A-V blockLeft axis deviationInferior infarct , age undeterminedAnterolateral infarct (cited on or before 20-FEB-2018)Abnormal ECGWhen compared with ECG of 20-FEB-2018 13:09,Significant changes have occurred     I spoke with NP Pito Riding We thoroughly discussed the history, physical exam, laboratory and imaging studies, as well as, emergency department course. Based upon that discussion, we've decided to admit Ilda Branham to the hospital for further observation, evaluation, and treatment. Final Impression  1. Constipation, unspecified constipation type    2. EDOUARD (acute kidney injury) ([address]      Blood pressure (!) 93/47, pulse (!) 107, temperature 97.2 °F (36.2 °C), temperature source Oral, resp. rate 18, height 5' 5\" (1.651 m), weight 272 lb 11.3 oz (123.7 kg), SpO2 98 %. DISPOSITION  Patient was admitted to the hospital in stable condition.           Armaan Garcia  11/27/22 [id number]

## 2022-11-27 LAB
ANION GAP SERPL CALCULATED.3IONS-SCNC: 15 MMOL/L (ref 3–16)
BASOPHILS ABSOLUTE: 0 K/UL (ref 0–0.2)
BASOPHILS RELATIVE PERCENT: 0.3 %
BUN BLDV-MCNC: 40 MG/DL (ref 7–20)
CALCIUM SERPL-MCNC: 8.6 MG/DL (ref 8.3–10.6)
CHLORIDE BLD-SCNC: 104 MMOL/L (ref 99–110)
CO2: 23 MMOL/L (ref 21–32)
CREAT SERPL-MCNC: 3.4 MG/DL (ref 0.6–1.2)
EKG ATRIAL RATE: 102 BPM
EKG DIAGNOSIS: NORMAL
EKG P AXIS: 52 DEGREES
EKG P-R INTERVAL: 262 MS
EKG Q-T INTERVAL: 408 MS
EKG QRS DURATION: 84 MS
EKG QTC CALCULATION (BAZETT): 531 MS
EKG R AXIS: -71 DEGREES
EKG T AXIS: 103 DEGREES
EKG VENTRICULAR RATE: 102 BPM
EOSINOPHILS ABSOLUTE: 0 K/UL (ref 0–0.6)
EOSINOPHILS RELATIVE PERCENT: 0.1 %
GFR SERPL CREATININE-BSD FRML MDRD: 12 ML/MIN/{1.73_M2}
GLUCOSE BLD-MCNC: 115 MG/DL (ref 70–99)
GLUCOSE BLD-MCNC: 96 MG/DL (ref 70–99)
HCT VFR BLD CALC: 38.6 % (ref 36–48)
HEMOGLOBIN: 12.7 G/DL (ref 12–16)
LYMPHOCYTES ABSOLUTE: 1.2 K/UL (ref 1–5.1)
LYMPHOCYTES RELATIVE PERCENT: 10.8 %
MCH RBC QN AUTO: 31.6 PG (ref 26–34)
MCHC RBC AUTO-ENTMCNC: 32.8 G/DL (ref 31–36)
MCV RBC AUTO: 96.6 FL (ref 80–100)
MONOCYTES ABSOLUTE: 0.7 K/UL (ref 0–1.3)
MONOCYTES RELATIVE PERCENT: 6.5 %
NEUTROPHILS ABSOLUTE: 8.9 K/UL (ref 1.7–7.7)
NEUTROPHILS RELATIVE PERCENT: 82.3 %
PDW BLD-RTO: 14.7 % (ref 12.4–15.4)
PERFORMED ON: ABNORMAL
PLATELET # BLD: 206 K/UL (ref 135–450)
PMV BLD AUTO: 7.7 FL (ref 5–10.5)
POTASSIUM REFLEX MAGNESIUM: 4.3 MMOL/L (ref 3.5–5.1)
RBC # BLD: 4 M/UL (ref 4–5.2)
SODIUM BLD-SCNC: 142 MMOL/L (ref 136–145)
TROPONIN: 0.06 NG/ML
TROPONIN: 0.07 NG/ML
WBC # BLD: 10.8 K/UL (ref 4–11)

## 2022-11-27 PROCEDURE — 93010 ELECTROCARDIOGRAM REPORT: CPT | Performed by: INTERNAL MEDICINE

## 2022-11-27 PROCEDURE — 6370000000 HC RX 637 (ALT 250 FOR IP): Performed by: NURSE PRACTITIONER

## 2022-11-27 PROCEDURE — 2580000003 HC RX 258: Performed by: NURSE PRACTITIONER

## 2022-11-27 PROCEDURE — 94761 N-INVAS EAR/PLS OXIMETRY MLT: CPT

## 2022-11-27 PROCEDURE — 85025 COMPLETE CBC W/AUTO DIFF WBC: CPT

## 2022-11-27 PROCEDURE — G0378 HOSPITAL OBSERVATION PER HR: HCPCS

## 2022-11-27 PROCEDURE — 2700000000 HC OXYGEN THERAPY PER DAY

## 2022-11-27 PROCEDURE — 96365 THER/PROPH/DIAG IV INF INIT: CPT

## 2022-11-27 PROCEDURE — 6360000002 HC RX W HCPCS: Performed by: INTERNAL MEDICINE

## 2022-11-27 PROCEDURE — 80048 BASIC METABOLIC PNL TOTAL CA: CPT

## 2022-11-27 PROCEDURE — 93005 ELECTROCARDIOGRAM TRACING: CPT | Performed by: INTERNAL MEDICINE

## 2022-11-27 PROCEDURE — 96375 TX/PRO/DX INJ NEW DRUG ADDON: CPT

## 2022-11-27 PROCEDURE — 96361 HYDRATE IV INFUSION ADD-ON: CPT

## 2022-11-27 PROCEDURE — 84484 ASSAY OF TROPONIN QUANT: CPT

## 2022-11-27 PROCEDURE — 36415 COLL VENOUS BLD VENIPUNCTURE: CPT

## 2022-11-27 PROCEDURE — 2580000003 HC RX 258: Performed by: INTERNAL MEDICINE

## 2022-11-27 PROCEDURE — 6360000002 HC RX W HCPCS: Performed by: NURSE PRACTITIONER

## 2022-11-27 RX ORDER — LABETALOL 100 MG/1
50 TABLET, FILM COATED ORAL 2 TIMES DAILY
Status: DISCONTINUED | OUTPATIENT
Start: 2022-11-27 | End: 2022-12-07 | Stop reason: HOSPADM

## 2022-11-27 RX ORDER — 0.9 % SODIUM CHLORIDE 0.9 %
500 INTRAVENOUS SOLUTION INTRAVENOUS ONCE
Status: COMPLETED | OUTPATIENT
Start: 2022-11-27 | End: 2022-11-27

## 2022-11-27 RX ORDER — LORAZEPAM 2 MG/ML
0.5 INJECTION INTRAMUSCULAR EVERY 12 HOURS PRN
Status: DISCONTINUED | OUTPATIENT
Start: 2022-11-27 | End: 2022-11-29

## 2022-11-27 RX ORDER — DEXTROSE AND SODIUM CHLORIDE 5; .45 G/100ML; G/100ML
INJECTION, SOLUTION INTRAVENOUS CONTINUOUS
Status: DISCONTINUED | OUTPATIENT
Start: 2022-11-27 | End: 2022-11-27

## 2022-11-27 RX ORDER — HYDRALAZINE HYDROCHLORIDE 50 MG/1
50 TABLET, FILM COATED ORAL 3 TIMES DAILY
Status: DISCONTINUED | OUTPATIENT
Start: 2022-11-27 | End: 2022-12-07 | Stop reason: HOSPADM

## 2022-11-27 RX ORDER — DEXTROSE AND SODIUM CHLORIDE 5; .9 G/100ML; G/100ML
INJECTION, SOLUTION INTRAVENOUS CONTINUOUS
Status: DISCONTINUED | OUTPATIENT
Start: 2022-11-27 | End: 2022-11-30

## 2022-11-27 RX ADMIN — SODIUM CHLORIDE 500 ML: 9 INJECTION, SOLUTION INTRAVENOUS at 14:39

## 2022-11-27 RX ADMIN — RIVAROXABAN 10 MG: 10 TABLET, FILM COATED ORAL at 16:33

## 2022-11-27 RX ADMIN — LABETALOL HYDROCHLORIDE 100 MG: 100 TABLET, FILM COATED ORAL at 01:28

## 2022-11-27 RX ADMIN — CEFTRIAXONE SODIUM 1000 MG: 1 INJECTION, POWDER, FOR SOLUTION INTRAMUSCULAR; INTRAVENOUS at 06:58

## 2022-11-27 RX ADMIN — GABAPENTIN 100 MG: 100 CAPSULE ORAL at 00:56

## 2022-11-27 RX ADMIN — LORAZEPAM 0.5 MG: 2 INJECTION INTRAMUSCULAR at 10:57

## 2022-11-27 RX ADMIN — DOXAZOSIN 1 MG: 2 TABLET ORAL at 01:28

## 2022-11-27 RX ADMIN — SODIUM CHLORIDE: 9 INJECTION, SOLUTION INTRAVENOUS at 00:55

## 2022-11-27 RX ADMIN — NYSTATIN: 100000 CREAM TOPICAL at 12:07

## 2022-11-27 RX ADMIN — ATORVASTATIN CALCIUM 40 MG: 40 TABLET, FILM COATED ORAL at 21:26

## 2022-11-27 RX ADMIN — GABAPENTIN 100 MG: 100 CAPSULE ORAL at 21:27

## 2022-11-27 RX ADMIN — RIVAROXABAN 10 MG: 10 TABLET, FILM COATED ORAL at 00:58

## 2022-11-27 RX ADMIN — DEXTROSE AND SODIUM CHLORIDE: 5; 900 INJECTION, SOLUTION INTRAVENOUS at 16:30

## 2022-11-27 RX ADMIN — ATORVASTATIN CALCIUM 40 MG: 40 TABLET, FILM COATED ORAL at 00:56

## 2022-11-27 RX ADMIN — BISACODYL 5 MG: 5 TABLET, COATED ORAL at 00:56

## 2022-11-27 ASSESSMENT — PAIN SCALES - PAIN ASSESSMENT IN ADVANCED DEMENTIA (PAINAD)
FACIALEXPRESSION: 0
NEGVOCALIZATION: 0
BODYLANGUAGE: 2
BREATHING: 0
FACIALEXPRESSION: 2
BODYLANGUAGE: 1
CONSOLABILITY: 0
TOTALSCORE: 9
NEGVOCALIZATION: 2
TOTALSCORE: 1
CONSOLABILITY: 2
BREATHING: 1

## 2022-11-27 ASSESSMENT — PAIN SCALES - GENERAL: PAINLEVEL_OUTOF10: 0

## 2022-11-27 NOTE — PROGRESS NOTES
Shift assessment completed. Pt disoriented x4. Pt aggressive physically; attempted to bite writer. Pt yelling out frequently in fear; provided pt w/therapeutic communication. PRN medication admin per [facility] for anxiety. Pt refusing all oral medications at this time. Pt assisted w/personal care and turning; pt physically aggressive while doing so. Bed locked and in lowest position. Call light within reach. Will continue to monitor.  Electronically signed by Osiris Collins RN on 11/27/2022 at 4:06 PM Thanks Sav Andujar,    1. Patient to have blood work done at the lab in 2 weeks, already ordered, and I will treat her by phone based on this. 2. Therefor she can have a follow up in 3 months, a Friday, 20 minutes slot in the 3 oclock hour. Please remind patient of the 2 week lab, paper not needed, order is in my name at the lab. Thanks ! Ammon Smart.  [facility]  [address] [place] [address]

## 2022-11-27 NOTE — H&P
Hospital Medicine History & Physical      PCP: Anh Mckeon MD    Date of Admission: 11/26/2022    Time of Service: 1940    Date of Service: Pt seen/examined on 11/26/2022 and placed in observation. Historian: ED documentation    Chief Concern:    Chief Complaint   Patient presents with    Abdominal Pain     Patient states she has not been able to use the restroom for 3 days and stomach hurts. Patient is tearful at this time. Palpitations     The [facility] reported to daughter that heart rate was 48 BPM    Fall     Patient placed back in bed after lunch and found on the floor as reported to daughter      History Of Present Illness:      Armida Hermosillo is a 44-year-old female with a significant past medical history of hypertension, stage IV CKD with a baseline creatinine of 1.8, chronic diastolic heart failure, and unspecified dementia who is a resident at the [facility] and was brought in via EMS for a multitude of concerns by [facility] staff: There is concerns for abdominal pain, report of little to no BM for the last 3 days; reportedly concerns for palpitations by the daughter who is not at bedside at this time; reported fall today after lunch, it was reported that she was placed back in bed after her mail and on the next check by staff she was found to be on the floor with no evidence of obvious injury. Given the dementia, she cannot provide much details in regards to the presenting complaints. The daughter was at bedside at some point during her ER stay but is no longer present to assist with data gathering. Additionally, daughter reports that she is undergoing antibiotic therapy for recent diagnosis of UTI, there is no knowledge of documentation of what antibiotic she is on. Her ED evaluation included laboratory studies and CT of the abdomen pelvis without contrast.  CT showed large amount of stool collecting in the sigmoid and rectum.   Pertinent lab values tonight include chloride 96, BUN 34, creatinine 2.5, GFR 18, troponin 0.06. LFT showed slight elevation of alkaline phosphatase at 166 and a lipase of 104. Cell count showed normal WBC at 8.6, hemoglobin 13.5, and platelets 362. Patient was given IV hydration, morphine, Zofran, and SMOG enema in the emergency department. Hospital team was consulted to place patient observation for constipation. Past Medical History:          Diagnosis Date    Arthritis     Basal cell carcinoma of left eyelid 02/19/2019    Bladder leak     Cellulitis     Chronic back pain     Chronic diastolic CHF (congestive heart failure) ([facility] [address]     see [place] [facility], follows Angie Almaguer NP    CKD (chronic kidney disease)     Dementia ([facility] [address]     Depression     HTN (hypertension) 12/01/2017    Tinnitus     Wears glasses      Past Surgical History:          Procedure Laterality Date    APPENDECTOMY      BLADDER SUSPENSION  2005    BREAST SURGERY      cyst left breast    CATARACT REMOVAL      both eyes    CHOLECYSTECTOMY      HEEL SPUR SURGERY      left heel and cyst from top of foot    MOHS SURGERY  02/19/2019    L medial lower eyelid     Medications Prior to Admission:      Prior to Admission medications    Medication Sig Start Date End Date Taking? Authorizing Provider   allopurinol (ZYLOPRIM) 100 MG tablet Take 100 mg by mouth daily    Historical Provider, MD   rivaroxaban (XARELTO) 10 MG TABS tablet Take 10 mg by mouth    Historical Provider, MD   torsemide (DEMADEX) 20 MG tablet Take 20 mg by mouth daily    Historical Provider, MD   traMADol (ULTRAM) 50 MG tablet Take 50 mg by mouth every 6 hours as needed for Pain.     Historical Provider, MD   tiZANidine (ZANAFLEX) 2 MG tablet Take 2 mg by mouth every 6 hours as needed    Historical Provider, MD   DULoxetine (CYMBALTA) 30 MG extended release capsule Take 30 mg by mouth daily    Historical Provider, MD   bisacodyl (DULCOLAX) 5 MG EC tablet Take 5 mg by mouth daily as needed for Constipation    Historical Provider, MD Cholecalciferol (VITAMIN D3 PO) Take by mouth    Historical Provider, MD   Potassium (POTASSIMIN PO) Take by mouth    Historical Provider, MD   gabapentin (NEURONTIN) 100 MG capsule Take 100 mg by mouth 3 times daily. Historical Provider, MD   nystatin (MYCOSTATIN) 159531 UNIT/GM cream Apply topically 2 times daily Apply topically 2 times daily. Historical Provider, MD   doxazosin (CARDURA) 2 MG tablet Take 1 mg by mouth nightly    Historical Provider, MD   diazepam (VALIUM) 2 MG tablet Take 2 mg by mouth 2 times daily as needed for Anxiety. Lucyann Push Historical Provider, MD   isosorbide mononitrate (IMDUR) 30 MG extended release tablet Take 1 tablet by mouth daily 2/25/18   Claude Ott MD   meclizine (ANTIVERT) 25 MG tablet Take 1 tablet by mouth 3 times daily as needed for Dizziness 2/24/18   Claude Ott MD   citalopram (CELEXA) 10 MG tablet Take 1 tablet by mouth daily 2/25/18   Claude Ott MD   atorvastatin (LIPITOR) 40 MG tablet Take 1 tablet by mouth nightly 2/24/18   Claude Ott MD   hydrALAZINE (APRESOLINE) 100 MG tablet Take 1 tablet by mouth 3 times daily 2/24/18   Claude Ott MD   metolazone (ZAROXOLYN) 5 MG tablet Take 5 mg by mouth Twice a Week    Historical Provider, MD   predniSONE (DELTASONE) 10 MG tablet Take 10 mg by mouth as needed Only when needed for inflammation on back- usually start with 10mg taper dose.      Historical Provider, MD   spironolactone (ALDACTONE) 25 MG tablet Take 1 tablet by mouth daily 12/8/17   Kelley Rodriguez MD   furosemide (LASIX) 40 MG tablet Take 1 tablet by mouth daily 12/8/17   Kelley Rodriguez MD   guanFACINE (TENEX) 1 MG tablet Take 1 mg by mouth every evening     Historical Provider, MD   Multiple Vitamins-Minerals (PRESERVISION/LUTEIN) CAPS Take 1 capsule by mouth daily    Historical Provider, MD   acetaminophen (TYLENOL) 500 MG tablet Take 500 mg by mouth every 6 hours as needed for Pain For chronic back pain Historical Provider, MD   labetalol (NORMODYNE) 100 MG tablet Take 100 mg by mouth 2 times daily     Historical Provider, MD   aspirin 81 MG chewable tablet Take 81 mg by mouth daily      Historical Provider, MD     Allergies:  Bee venom, Nutritional supplements, Oxycodone, Codeine, Hydrocodone-acetaminophen, Morphine, and Percocet [oxycodone-acetaminophen]    Social History:      The patient currently lives at [facility] ([facility] TOBACCO:   reports that she has never smoked. She has never used smokeless tobacco.  ETOH:   reports no history of alcohol use. E-cigarette/Vaping       Questions Responses    E-cigarette/Vaping Use Never User    Start Date     Passive Exposure     Quit Date     Counseling Given     Comments           Family History:      Reviewed in detail at bedside with patient; can not provide me with any pertinent family history. Family history unknown: Yes       REVIEW OF SYSTEMS COMPLETED:   Pertinent positives as noted in the HPI. All other systems reviewed and negative. PHYSICAL EXAM PERFORMED:    /82   Pulse (!) 111   Temp 97.2 °F (36.2 °C) (Oral)   Resp 25   SpO2 92%     General appearance:  Slight grimacing, moaning softly saying \"my stomach hurts,\" appears stated age and cooperative. HEENT:  Normal cephalic, atraumatic without obvious deformity. Pupils equal, round, and reactive to light. Extra ocular muscles intact. Conjunctivae/corneas clear. Neck: Supple, with full range of motion. No jugular venous distention. Trachea midline. Respiratory:  Normal respiratory effort. Clear to auscultation, bilaterally without Rales/Wheezes/Rhonchi. Moaning with pain despite instructing to breathe quietly, but may have distorted auscultation  Cardiovascular:  Regular rate and rhythm with normal S1/S2 without murmurs, rubs or gallops. 1+ edema bilateral LE. Abdomen: Soft, rotund with large habitus, diffuse tenderness with focalization to LLQ, non-distended with normal bowel sounds.  CYDNEY was performed as the enema was being performed immediately after my examination: active stooling when patient was turned and brief was removed, light brown stool, type 5 on [place] Stool Form Scale; CYDNEY revealed large stool burden within the rectal vault, soft on palpation, did not palpate any larger, firmer rectal content suggesting fecal impaction, moderate amount of stool on glove afterwards, same color and characteristics as when she was stooling into brief, no holli blood. Musculoskeletal:  No clubbing, cyanosis or edema bilaterally. Full range of motion without deformity. Skin: Skin color, texture, turgor normal. Left buttock stage II pressure ulcer noted, small, linear in shape, 0.5 cm in length, larger stage I decubitus noted both gluteal and sacrum, blanchable without lesions, tears, or induration. Neurologic:  Neurovascularly intact without any focal sensory/motor deficits. Cranial nerves: II-XII intact, grossly non-focal.  Psychiatric:  Alert and oriented x 1 (self only), thought content difficult to assess, fixated on pain, unclear insight  Capillary Refill: Brisk,3 seconds, normal  Peripheral Pulses: +2 palpable, equal bilaterally     Labs:     Recent Labs     11/26/22  1604   WBC 8.6   HGB 13.5   HCT 41.3        Recent Labs     11/26/22  1604      K 3.9   CL 96*   CO2 24   BUN 34*   CREATININE 2.5*   CALCIUM 9.5     Recent Labs     11/26/22  1604   AST 30   ALT 20   BILITOT 0.8   ALKPHOS 166*     Recent Labs     11/26/22  1604   TROPONINI 0.06*     Urinalysis: Pending    Radiology:     I have reviewed the radiographic results for this encounter with the following interpretation(s); see report(s) below:    CT ABDOMEN PELVIS WO CONTRAST Additional Contrast? None   Final Result   1. Constipation with large amount of stool within the sigmoid colon and   rectum. Otherwise, no acute intra-abdominal or pelvic abnormality with   limitations for a noncontrast CT scan.    2. Diverticulosis Fat/Low Chol/High Fiber/2 gm Na; 1500 ml  Code Status: Full Code    PT/OT Eval Status: N/A at this time    Dispo - 1-2 days per clinical improvement    LEYLA Jimenez - CNP    Thank you Colletta Pepper, MD for the opportunity to be involved in this patient's care.  If you have any questions or concerns please feel free to contact me at [phone].---Anticipated co-signer, Dr. Leonardo Allen    (Please note that portions of this note were completed with a voice recognition program. Efforts were made to edit the dictations but occasionally words are mis-transcribed.)

## 2022-11-27 NOTE — PLAN OF CARE
Problem: Safety - Adult  Goal: Free from fall injury  Outcome: Progressing  Flowsheets (Taken 11/27/2022 0508)  Free From Fall Injury: Instruct family/caregiver on patient safety     Problem: ABCDS Injury Assessment  Goal: Absence of physical injury  Outcome: Progressing  Flowsheets (Taken 11/27/2022 0508)  Absence of Physical Injury: Implement safety measures based on patient assessment

## 2022-11-27 NOTE — PROGRESS NOTES
Pt sustaining HR in the low 40's. MD paged via [place];awaiting reply.  Electronically signed by Willem Wheeler RN on 11/27/2022 at 8:08 AM

## 2022-11-27 NOTE — PROGRESS NOTES
Pt admitted from ER to room 332. Pt a&ox2. Disoriented to place and situation. VSS. Admission assessment complete and charted. Standard safety measures implemented. Bed in lowest position, wheels locked, bed alarm on. Call light within reach. Pt advised to use call light for assistance, pt verbalized understanding. Pt arrived to room incontinent of stool. F/c in place and is now CDI, Fany care complete. Output is yellow and cloudy. Pt has a stage 2 wound on L buttocks and skin breakdown under L breast. Applied NYSTATIN cream per order and zinc on buttocks with a brief underneath pt. Redness on bottom noted and is blanchable. Mepilex applied for preventive use. HOB and feet elevated with pillows. Heel protectors applied. Pt uses a rona lift at nursing home facility. Pt denies any pain or further needs upon writer leaving room.

## 2022-11-27 NOTE — PROGRESS NOTES
Pt w/50 ml of urine output for shift. Vi Sandoval notified via face to face. See new orders.  Electronically signed by Gabriela Baum RN on 11/27/2022 at 3:56 PM

## 2022-11-27 NOTE — PROGRESS NOTES
Hospitalist Progress Note      PCP: Jun Hurtado MD    Date of Admission: 11/26/2022    Chief Complaint:   Confusion    Hospital Course: This is a 70-year-old female with history of dementia. Patient unable to give accurate history. Per ED report patient with constipation for several days. Patient with increasing abdominal pain. Patient was mentating at baseline upon admission. Patient was also being treated for UTI. Patient has had decreased appetite with nausea. No emesis reported. Patient's mental status was improved this morning but worsened later in the morning. Patient was disorientated and paranoid. Subjective:   Early patient denied any chest pain. No shortness of breath. Patient states she was working on her breakfast.  Later in the morning she was disoriented to place and time.       Medications:  Reviewed    Infusion Medications    sodium chloride       Scheduled Medications    cefTRIAXone (ROCEPHIN) IV  1,000 mg IntraVENous Q24H    labetalol  50 mg Oral BID    hydrALAZINE  50 mg Oral TID    allopurinol  100 mg Oral Daily    aspirin  81 mg Oral Daily    atorvastatin  40 mg Oral Nightly    bisacodyl  5 mg Oral Nightly    citalopram  10 mg Oral Daily    doxazosin  1 mg Oral Nightly    DULoxetine  30 mg Oral Daily    [Held by provider] furosemide  40 mg Oral Daily    gabapentin  100 mg Oral TID    guanFACINE  1 mg Oral QPM    isosorbide mononitrate  30 mg Oral Daily    nystatin   Topical BID    rivaroxaban  10 mg Oral Dinner    spironolactone  25 mg Oral Daily    sodium chloride flush  5-40 mL IntraVENous 2 times per day     PRN Meds: LORazepam, sodium chloride flush, sodium chloride, ondansetron **OR** ondansetron, acetaminophen **OR** acetaminophen, dicyclomine      Intake/Output Summary (Last 24 hours) at 11/27/2022 1320  Last data filed at 11/27/2022 1126  Gross per 24 hour   Intake 0 ml   Output 200 ml   Net -200 ml       Physical Exam Performed:    BP 94/63   Pulse 96   Temp 97.9 °F (36.6 °C) (Axillary)   Resp 22   Ht 5' 5\" (1.651 m) Comment: pt stated  Wt 272 lb 11.3 oz (123.7 kg)   SpO2 97%   BMI 45.38 kg/m²     General appearance: No apparent distress, appears stated age and cooperative. Patient lying in bed. HEENT: Pupils equal, round, and reactive to light. Conjunctivae/corneas clear. Neck: Supple, with full range of motion. No jugular venous distention. Trachea midline. Respiratory:  Normal respiratory effort. Clear to auscultation, bilaterally without Rales/Wheezes/Rhonchi. Cardiovascular: Regular rate and rhythm with normal S1/S2 without murmurs, rubs or gallops. Abdomen: Soft, non-tender, non-distended with normal bowel sounds. Musculoskeletal: No clubbing, cyanosis or edema bilaterally. Full range of motion without deformity. Skin: Skin color, texture, turgor normal.  No rashes or lesions. Neurologic:  Neurovascularly intact without any focal sensory/motor deficits. Cranial nerves: II-XII intact, grossly non-focal.  Psychiatric: Alert and oriented, thought content appropriate, normal insight  Capillary Refill: Brisk, 3 seconds, normal   Peripheral Pulses: +2 palpable, equal bilaterally       Labs:   Recent Labs     11/26/22  1604   WBC 8.6   HGB 13.5   HCT 41.3        Recent Labs     11/26/22  1604      K 3.9   CL 96*   CO2 24   BUN 34*   CREATININE 2.5*   CALCIUM 9.5     Recent Labs     11/26/22  1604   AST 30   ALT 20   BILITOT 0.8   ALKPHOS 166*     No results for input(s): INR in the last 72 hours.   Recent Labs     11/26/22  1604   TROPONINI 0.06*       Urinalysis:      Lab Results   Component Value Date/Time    NITRU Negative 11/26/2022 08:34 PM    WBCUA >100 11/26/2022 08:34 PM    BACTERIA 2+ 11/26/2022 08:34 PM    RBCUA 3-4 11/26/2022 08:34 PM    BLOODU TRACE-INTACT 11/26/2022 08:34 PM    SPECGRAV 1.020 11/26/2022 08:34 PM    GLUCOSEU Negative 11/26/2022 08:34 PM       Radiology:  CT ABDOMEN PELVIS WO CONTRAST Additional Contrast? None Final Result   1. Constipation with large amount of stool within the sigmoid colon and   rectum. Otherwise, no acute intra-abdominal or pelvic abnormality with   limitations for a noncontrast CT scan. 2. Diverticulosis without obvious inflammation. None    Assessment/Plan:    Active Hospital Problems    Diagnosis     Constipation [K59.00]      Priority: Medium    Elevated serum creatinine [R79.89]      Priority: Medium     1. Metabolic encephalopathy with underlying dementia. Patient with probable UTI. We will correct underlying problems. Monitor for sundowning. Short acting benzo ordered for agitation. 2.  UTI. We will start patient on antibiotics patient is on ceftriaxone. 3.  Chronic anticoagulation. We will check previous records and check with family to see why patient is on Xarelto. We will check EKG to see if patient is in atrial fibrillation. Possibly due to secondary hypercoagulable state due to atrial fibrillation/a flutter? 4. Hypotension. Patient with brief episode of hypotension. Patient did receive morphine yesterday which does cause hypotension. Currently her blood pressure is improved. We will start IV fluids and monitor closely. 5.  EDOUARD on CKD. Patient does follow with Dr. Allison Angel as outpatient. Baseline creatinine around 1.5-1.8. DVT Prophylaxis: Xarelto  Diet: ADULT DIET;  Regular; Low Fat/Low Chol/High Fiber/2 gm Na; 1500 ml  Code Status: Full Code  PT/OT Eval Status: Pending    Dispo -patient will need SNF placement on discharge    Appropriate for A1 Discharge Unit: Khushi Vo MD

## 2022-11-27 NOTE — PROGRESS NOTES
Per Nephro keep thao in place until renal function returns to baseline.  Electronically signed by Nuha Gould RN on 11/27/2022 at 4:58 PM

## 2022-11-27 NOTE — CARE COORDINATION
CASE MANAGEMENT INITIAL ASSESSMENT      Reviewed chart and completed assessment with patient:confused. Family present: none spoke with Eduardo Ramirez dtr via phone. Explained Case Management role/services. Primary contact Uday :   Primary Decision Maker: Luis Antonio Dominguez - [phone]          Can this person be reached and be able to respond quickly, such as within a few minutes or hours? Yes      Admit date/status:11/26/22  Diagnosis:constipation   Is this a Readmission?:  No      Insurance:medicare   Precert required for SNF: No       3 night stay required: Yes    Living arrangements, Adls, care needs, prior to admission:lives in [place] at [facility], tested + for covid a month ago, has been confused, now with UTI. Durable Medical Equipment at home:  Walker_x_Cane__RTS__ BSC__Shower Chair__  02__ HHN__ CPAP__  BiPap__  Hospital Bed__ W/C__x2_ Other_____    Services in the home and/or outpatient, prior to admission:currently skilled at the [facility]    Current PCP:Laly                                Medications Prescription coverage? yes    Transportation needs: squad     PT/OT recs:none    [address] Notification (HEN):needed for SNF    Barriers to discharge:none    Plan/comments:spoke with patients daughter via phone. Reported patient currently skilled at [facility] from [address] [facility] originally. Per daughter plan to return at d/c. Call to [address] [place] [address] at [facility] message left for requirements to return.  Shankar Davila RN      ECOC on chart for MD signature

## 2022-11-27 NOTE — PROGRESS NOTES
Pt w/a MEWS score of 6. BP 48/24, , Temp 97.9, SpO2 93% on RA. MD at bedside; see new orders.  Electronically signed by Nuha Gould RN on 11/27/2022 at 11:16 AM

## 2022-11-27 NOTE — PROGRESS NOTES
4 Eyes Skin Assessment     The patient is being assess for  Admission    I agree that 2 RN's have performed a thorough Head to Toe Skin Assessment on the patient. ALL assessment sites listed below have been assessed. Areas assessed by both nurses: Mirta Jensen RN, AMY RN  [x]   Head, Face, and Ears   [x]   Shoulders, Back, and Chest  [x]   Arms, Elbows, and Hands   [x]   Coccyx, Sacrum, and IschIum  [x]   Legs, Feet, and Heels        Does the Patient have Skin Breakdown?   YES      - Open wound on left buttock  - excoriation under L breast  Zach Prevention initiated:  Yes   Wound Care Orders initiated:  No      WOC nurse consulted for Pressure Injury (Stage 3,4, Unstageable, DTI, NWPT, and Complex wounds), New and Established Ostomies:  No      Nurse 1 eSignature: Electronically signed by Riri Salmeron RN on 11/26/22 at 10:30 PM EST    **SHARE this note so that the co-signing nurse is able to place an eSignature**    Nurse 2 eSignature: {Esignature:[id number]}

## 2022-11-27 NOTE — CONSULTS
[facility]  Nephrology Consult Note           Reason for Consult: EDOAURD on CKD  Requesting Physician:  Dr. Jael Luke    Chief Complaint:    Chief Complaint   Patient presents with    Abdominal Pain     Patient states she has not been able to use the restroom for 3 days and stomach hurts. Patient is tearful at this time. Palpitations     [employer] reported to daughter that heart rate was 48 BPM    Fall     Patient placed back in bed after lunch and found on the floor as reported to daughter      History of Present Illness on 11/27/2022:    80 y.o. yo female with PMH of chronic kidney disease stage III, hypertension, CHF with preserved EF, dementia, chronic lymphedema who is admitted for abdominal pain palpitation and fall  Patient was brought from nursing care facility to emergency room due to abdominal pain and not having bowel movement for 3 days. Patient also had palpitations at the nursing home. She was recently started on ciprofloxacin for presumed UTI  Her creatinine at nursing care facility was 1.9 middle of November  Patient has dementia and is not able to give meaningful history    Past Medical History:        Diagnosis Date    Arthritis     Basal cell carcinoma of left eyelid 02/19/2019    Bladder leak     Cellulitis     Chronic back pain     Chronic diastolic CHF (congestive heart failure) ([facility] [address]     see [place] [facility] notes, follows Lakeshia Montero NP    CKD (chronic kidney disease)     Dementia ([facility] [address]     Depression     HTN (hypertension) 12/01/2017    Tinnitus     Wears glasses        Past Surgical History:        Procedure Laterality Date    APPENDECTOMY      BLADDER SUSPENSION  2005    BREAST SURGERY      cyst left breast    CATARACT REMOVAL      both eyes    CHOLECYSTECTOMY      HEEL SPUR SURGERY      left heel and cyst from top of foot    MOHS SURGERY  02/19/2019    L medial lower eyelid       Home Medications:    No current facility-administered medications on file prior to encounter.      Current Outpatient Medications on File Prior to Encounter   Medication Sig Dispense Refill    allopurinol (ZYLOPRIM) 100 MG tablet Take 100 mg by mouth daily      rivaroxaban (XARELTO) 10 MG TABS tablet Take 10 mg by mouth      torsemide (DEMADEX) 20 MG tablet Take 20 mg by mouth daily      traMADol (ULTRAM) 50 MG tablet Take 50 mg by mouth every 6 hours as needed for Pain. tiZANidine (ZANAFLEX) 2 MG tablet Take 2 mg by mouth every 6 hours as needed      DULoxetine (CYMBALTA) 30 MG extended release capsule Take 30 mg by mouth daily      bisacodyl (DULCOLAX) 5 MG EC tablet Take 5 mg by mouth daily as needed for Constipation      Cholecalciferol (VITAMIN D3 PO) Take by mouth      Potassium (POTASSIMIN PO) Take by mouth      gabapentin (NEURONTIN) 100 MG capsule Take 100 mg by mouth 3 times daily. nystatin (MYCOSTATIN) 003124 UNIT/GM cream Apply topically 2 times daily Apply topically 2 times daily. doxazosin (CARDURA) 2 MG tablet Take 1 mg by mouth nightly      diazepam (VALIUM) 2 MG tablet Take 2 mg by mouth 2 times daily as needed for Anxiety. .      isosorbide mononitrate (IMDUR) 30 MG extended release tablet Take 1 tablet by mouth daily 30 tablet 1    meclizine (ANTIVERT) 25 MG tablet Take 1 tablet by mouth 3 times daily as needed for Dizziness 15 tablet 0    citalopram (CELEXA) 10 MG tablet Take 1 tablet by mouth daily 30 tablet 0    atorvastatin (LIPITOR) 40 MG tablet Take 1 tablet by mouth nightly 30 tablet 0    hydrALAZINE (APRESOLINE) 100 MG tablet Take 1 tablet by mouth 3 times daily 90 tablet 0    metolazone (ZAROXOLYN) 5 MG tablet Take 5 mg by mouth Twice a Week      predniSONE (DELTASONE) 10 MG tablet Take 10 mg by mouth as needed Only when needed for inflammation on back- usually start with 10mg taper dose.        spironolactone (ALDACTONE) 25 MG tablet Take 1 tablet by mouth daily 30 tablet 3    furosemide (LASIX) 40 MG tablet Take 1 tablet by mouth daily 30 tablet 0    guanFACINE (TENEX) 1 MG tablet Take 1 mg by mouth every evening       Multiple Vitamins-Minerals (PRESERVISION/LUTEIN) CAPS Take 1 capsule by mouth daily      acetaminophen (TYLENOL) 500 MG tablet Take 500 mg by mouth every 6 hours as needed for Pain For chronic back pain      labetalol (NORMODYNE) 100 MG tablet Take 100 mg by mouth 2 times daily       aspirin 81 MG chewable tablet Take 81 mg by mouth daily           Allergies:  Bee venom, Nutritional supplements, Oxycodone, Codeine, Hydrocodone-acetaminophen, Morphine, and Percocet [oxycodone-acetaminophen]    Social History:    Social History     Socioeconomic History    Marital status:       Spouse name: Not on file    Number of children: Not on file    Years of education: Not on file    Highest education level: Not on file   Occupational History    Not on file   Tobacco Use    Smoking status: Never    Smokeless tobacco: Never   Vaping Use    Vaping Use: Never used   Substance and Sexual Activity    Alcohol use: No    Drug use: No    Sexual activity: Not on file   Other Topics Concern    Not on file   Social History Narrative    Not on file     Social Determinants of Health     Financial Resource Strain: Not on file   Food Insecurity: Not on file   Transportation Needs: Not on file   Physical Activity: Not on file   Stress: Not on file   Social Connections: Not on file   Intimate Partner Violence: Not on file   Housing Stability: Not on file       Family History:   Family History   Family history unknown: Yes       Review of Systems:   Unable to obtain    Physical exam:   Constitutional:  VITALS:  BP (!) 93/47   Pulse (!) 107   Temp 97.2 °F (36.2 °C) (Oral)   Resp 18   Ht 5' 5\" (1.651 m) Comment: pt stated  Wt 272 lb 11.3 oz (123.7 kg)   SpO2 98%   BMI 45.38 kg/m²   Gen: alert, awake  Neck: No JVD  Skin: Unremarkable  Cardiovascular:  S1, S2 without m/r/g   Respiratory: CTA B without w/r/r; respiratory effort normal  Abdomen:  soft, nt, nd,   Extremities: Chronic lymphedema of lower extremities  Neuro/Psy: AAoriented times 1 ; moves all 4 ext    Data/  Recent Labs     11/26/22  1604 11/27/22  1433   WBC 8.6 10.8   HGB 13.5 12.7   HCT 41.3 38.6   MCV 93.4 96.6    206     Recent Labs     11/26/22  1604      K 3.9   CL 96*   CO2 24   GLUCOSE 115*   BUN 34*   CREATININE 2.5*   LABGLOM 18*     UA with 3-5 hyaline cast, large leukocyte esterase more than 100 WBCs    Assessment  -EDOUARD on CKD in the setting of possible UTI and decreased p.o. intake and relative hypotension. Patient is on torsemide, metolazone and spironolactone as well   Chronic kidney disease stage IIIb with baseline creatinine of 1.8-1.9, follows up with Dr. Gayle Gardner  -Chronic diastolic CHF based on echocardiogram from 2018, patient had grade 1 diastolic dysfunction   She also has chronic lymphedema and uses pump along with diuretics as above  -Dementia  -UTI    Plan  -Additional 500 mL normal saline bolus and D5 NS at 75 mill per hour  -Hold torsemide metolazone and spironolactone  -Follow urine culture, continue antibiotics  -Serial renal panel  -daily wts and strict i/o  -renal dose medications   -avoid nephrotoxins      Thank you for the consultation. Please do not hesitate to call with questions. Marva Ricketts MD  Office: [phone]  Fax:    [phone] [address] [facility]

## 2022-11-28 LAB
ANION GAP SERPL CALCULATED.3IONS-SCNC: 9 MMOL/L (ref 3–16)
BUN BLDV-MCNC: 38 MG/DL (ref 7–20)
CALCIUM SERPL-MCNC: 7.7 MG/DL (ref 8.3–10.6)
CHLORIDE BLD-SCNC: 108 MMOL/L (ref 99–110)
CO2: 24 MMOL/L (ref 21–32)
CREAT SERPL-MCNC: 3.1 MG/DL (ref 0.6–1.2)
GFR SERPL CREATININE-BSD FRML MDRD: 14 ML/MIN/{1.73_M2}
GLUCOSE BLD-MCNC: 112 MG/DL (ref 70–99)
MAGNESIUM: 1.7 MG/DL (ref 1.8–2.4)
POTASSIUM REFLEX MAGNESIUM: 3.7 MMOL/L (ref 3.5–5.1)
SODIUM BLD-SCNC: 141 MMOL/L (ref 136–145)

## 2022-11-28 PROCEDURE — 96366 THER/PROPH/DIAG IV INF ADDON: CPT

## 2022-11-28 PROCEDURE — 36415 COLL VENOUS BLD VENIPUNCTURE: CPT

## 2022-11-28 PROCEDURE — 2580000003 HC RX 258: Performed by: INTERNAL MEDICINE

## 2022-11-28 PROCEDURE — 96376 TX/PRO/DX INJ SAME DRUG ADON: CPT

## 2022-11-28 PROCEDURE — G0378 HOSPITAL OBSERVATION PER HR: HCPCS

## 2022-11-28 PROCEDURE — 6360000002 HC RX W HCPCS: Performed by: INTERNAL MEDICINE

## 2022-11-28 PROCEDURE — 96361 HYDRATE IV INFUSION ADD-ON: CPT

## 2022-11-28 PROCEDURE — 96367 TX/PROPH/DG ADDL SEQ IV INF: CPT

## 2022-11-28 PROCEDURE — 80048 BASIC METABOLIC PNL TOTAL CA: CPT

## 2022-11-28 PROCEDURE — 6370000000 HC RX 637 (ALT 250 FOR IP): Performed by: NURSE PRACTITIONER

## 2022-11-28 PROCEDURE — 83735 ASSAY OF MAGNESIUM: CPT

## 2022-11-28 PROCEDURE — 6370000000 HC RX 637 (ALT 250 FOR IP): Performed by: INTERNAL MEDICINE

## 2022-11-28 PROCEDURE — 6360000002 HC RX W HCPCS: Performed by: NURSE PRACTITIONER

## 2022-11-28 PROCEDURE — 2580000003 HC RX 258: Performed by: NURSE PRACTITIONER

## 2022-11-28 RX ORDER — MAGNESIUM SULFATE 1 G/100ML
1000 INJECTION INTRAVENOUS ONCE
Status: COMPLETED | OUTPATIENT
Start: 2022-11-28 | End: 2022-11-28

## 2022-11-28 RX ADMIN — Medication 10 ML: at 01:27

## 2022-11-28 RX ADMIN — BISACODYL 5 MG: 5 TABLET, COATED ORAL at 22:41

## 2022-11-28 RX ADMIN — CITALOPRAM HYDROBROMIDE 10 MG: 20 TABLET ORAL at 09:16

## 2022-11-28 RX ADMIN — NYSTATIN: 100000 CREAM TOPICAL at 22:47

## 2022-11-28 RX ADMIN — DEXTROSE AND SODIUM CHLORIDE: 5; 900 INJECTION, SOLUTION INTRAVENOUS at 19:38

## 2022-11-28 RX ADMIN — ALLOPURINOL 100 MG: 100 TABLET ORAL at 09:16

## 2022-11-28 RX ADMIN — NYSTATIN: 100000 CREAM TOPICAL at 09:20

## 2022-11-28 RX ADMIN — ATORVASTATIN CALCIUM 40 MG: 40 TABLET, FILM COATED ORAL at 23:12

## 2022-11-28 RX ADMIN — LORAZEPAM 0.5 MG: 2 INJECTION INTRAMUSCULAR at 01:26

## 2022-11-28 RX ADMIN — CEFTRIAXONE SODIUM 1000 MG: 1 INJECTION, POWDER, FOR SOLUTION INTRAMUSCULAR; INTRAVENOUS at 06:32

## 2022-11-28 RX ADMIN — DOXAZOSIN 1 MG: 2 TABLET ORAL at 23:12

## 2022-11-28 RX ADMIN — LABETALOL HYDROCHLORIDE 50 MG: 100 TABLET, FILM COATED ORAL at 22:42

## 2022-11-28 RX ADMIN — ASPIRIN 81 MG 81 MG: 81 TABLET ORAL at 09:13

## 2022-11-28 RX ADMIN — MAGNESIUM SULFATE HEPTAHYDRATE 1000 MG: 1 INJECTION, SOLUTION INTRAVENOUS at 10:57

## 2022-11-28 RX ADMIN — HYDRALAZINE HYDROCHLORIDE 50 MG: 50 TABLET, FILM COATED ORAL at 22:42

## 2022-11-28 RX ADMIN — RIVAROXABAN 10 MG: 10 TABLET, FILM COATED ORAL at 16:47

## 2022-11-28 RX ADMIN — DULOXETINE HYDROCHLORIDE 30 MG: 30 CAPSULE, DELAYED RELEASE ORAL at 09:12

## 2022-11-28 NOTE — PROGRESS NOTES
Hospitalist Progress Note      PCP: Criss Montero MD    Date of Admission: 11/26/2022    Chief Complaint:   Confusion    Hospital Course: This is a 80-year-old female with history of dementia. Patient unable to give accurate history. Per ED report patient with constipation for several days. Patient with increasing abdominal pain. Patient was mentating at baseline upon admission. Patient was also being treated for UTI. Patient has had decreased appetite with nausea. No emesis reported. Patient's mental status was improved this morning but worsened later in the morning. Patient was disorientated and paranoid. Subjective:   No appetite. No abdominal pain.        Medications:  Reviewed    Infusion Medications    dextrose 5 % and 0.9 % NaCl 75 mL/hr at 11/27/22 1630    sodium chloride       Scheduled Medications    magnesium sulfate  1,000 mg IntraVENous Once    cefTRIAXone (ROCEPHIN) IV  1,000 mg IntraVENous Q24H    labetalol  50 mg Oral BID    hydrALAZINE  50 mg Oral TID    allopurinol  100 mg Oral Daily    aspirin  81 mg Oral Daily    atorvastatin  40 mg Oral Nightly    bisacodyl  5 mg Oral Nightly    citalopram  10 mg Oral Daily    doxazosin  1 mg Oral Nightly    DULoxetine  30 mg Oral Daily    [Held by provider] furosemide  40 mg Oral Daily    gabapentin  100 mg Oral TID    guanFACINE  1 mg Oral QPM    isosorbide mononitrate  30 mg Oral Daily    nystatin   Topical BID    rivaroxaban  10 mg Oral Dinner    spironolactone  25 mg Oral Daily    sodium chloride flush  5-40 mL IntraVENous 2 times per day     PRN Meds: LORazepam, sodium chloride flush, sodium chloride, ondansetron **OR** ondansetron, acetaminophen **OR** acetaminophen, dicyclomine      Intake/Output Summary (Last 24 hours) at 11/28/2022 1002  Last data filed at 11/28/2022 0921  Gross per 24 hour   Intake 737.28 ml   Output 300 ml   Net 437.28 ml         Physical Exam Performed:    /63   Pulse (!) 101   Temp 97.6 °F (36.4 °C) (Oral)   Resp 18   Ht 5' 5\" (1.651 m) Comment: pt stated  Wt 272 lb 11.3 oz (123.7 kg)   SpO2 90%   BMI 45.38 kg/m²     General appearance: No apparent distress, appears stated age and cooperative. Patient lying in bed. HEENT: Pupils equal, round, and reactive to light. Conjunctivae/corneas clear. Neck: Supple, with full range of motion. No jugular venous distention. Trachea midline. Respiratory:  Normal respiratory effort. Clear to auscultation, bilaterally without Rales/Wheezes/Rhonchi. Cardiovascular: Regular rate and rhythm with normal S1/S2 without murmurs, rubs or gallops. Abdomen: Soft, non-tender, non-distended with normal bowel sounds. Musculoskeletal: No clubbing, cyanosis or edema bilaterally. Full range of motion without deformity. Skin: Skin color, texture, turgor normal.  No rashes or lesions. Neurologic:  Neurovascularly intact without any focal sensory/motor deficits. Cranial nerves: II-XII intact, grossly non-focal.  Psychiatric: Alert and oriented, limited insight  Capillary Refill: Brisk, 3 seconds, normal   Peripheral Pulses: +2 palpable, equal bilaterally       Labs:   Recent Labs     11/26/22  1604 11/27/22  1433   WBC 8.6 10.8   HGB 13.5 12.7   HCT 41.3 38.6    206       Recent Labs     11/26/22  1604 11/27/22  1433 11/28/22  0826    142 141   K 3.9 4.3 3.7   CL 96* 104 108   CO2 24 23 24   BUN 34* 40* 38*   CREATININE 2.5* 3.4* 3.1*   CALCIUM 9.5 8.6 7.7*       Recent Labs     11/26/22  1604   AST 30   ALT 20   BILITOT 0.8   ALKPHOS 166*       No results for input(s): INR in the last 72 hours.   Recent Labs     11/26/22  1604 11/27/22  1433 11/27/22  1750   TROPONINI 0.06* 0.07* 0.06*         Urinalysis:      Lab Results   Component Value Date/Time    NITRU Negative 11/26/2022 08:34 PM    WBCUA >100 11/26/2022 08:34 PM    BACTERIA 2+ 11/26/2022 08:34 PM    RBCUA 3-4 11/26/2022 08:34 PM    BLOODU TRACE-INTACT 11/26/2022 08:34 PM    SPECGRAV 1.020 11/26/2022 08:34 PM GLUCOSEU Negative 11/26/2022 08:34 PM       Radiology:  CT ABDOMEN PELVIS WO CONTRAST Additional Contrast? None   Final Result   1. Constipation with large amount of stool within the sigmoid colon and   rectum. Otherwise, no acute intra-abdominal or pelvic abnormality with   limitations for a noncontrast CT scan. 2. Diverticulosis without obvious inflammation. IP CONSULT TO NEPHROLOGY    Assessment/Plan:    Active Hospital Problems    Diagnosis     Constipation [K59.00]      Priority: Medium    Elevated serum creatinine [R79.89]      Priority: Medium     Acute Metabolic encephalopathy with underlying dementia. Likely 2/2 UTI.     UTI: Abnormal UA. UCx showed GNR and Enterococcus. Continue empiric Abx. Chronic anticoagulation. Hypotension. Ongoing low/normal BP. She is on multiple BP meds at baseline and these will need to be held. Regimen may need to be adjusted. EDOUARD on CKD. Baseline creatinine around 1.5-1.8. IVF hydration. Nephrology following. Monitor. DVT Prophylaxis: Xarelto  Diet: ADULT DIET;  Regular; Low Fat/Low Chol/High Fiber/2 gm Na; 1500 ml  Code Status: Full Code  PT/OT Eval Status: Pending    Dispo -patient will need SNF placement on discharge    Appropriate for A1 Discharge Unit: Khuhsi Evans MD

## 2022-11-28 NOTE — CARE COORDINATION
Chart reviewed. Spoke with Paulina Ling at [facility]. Ok to return skilled at [facility] when medically ready. No barriers. Therapy would be helpful.  Fidel Horner RN

## 2022-11-28 NOTE — PROGRESS NOTES
4 Eyes Skin Assessment     The patient is being assess for  Shift Handoff    I agree that 2 RN's have performed a thorough Head to Toe Skin Assessment on the patient. ALL assessment sites listed below have been assessed. Areas assessed by both nurses:   []   Head, Face, and Ears   []   Shoulders, Back, and Chest  []   Arms, Elbows, and Hands   []   Coccyx, Sacrum, and IschIum  []   Legs, Feet, and Heels        Does the Patient have Skin Breakdown?   Yes LDA WOUND CARE was Initiated documentation include the Fany-wound, Wound Assessment, Measurements, Dressing Treatment, Drainage, and Color\",         Zach Prevention initiated:  Yes   Wound Care Orders initiated:  No      WOC nurse consulted for Pressure Injury (Stage 3,4, Unstageable, DTI, NWPT, and Complex wounds), New and Established Ostomies:  No      Nurse 1 eSignature: Electronically signed by Christopher Florez RN on 11/28/22 at 6:19 PM EST    **SHARE this note so that the co-signing nurse is able to place an eSignature**    Nurse 2 eSignature: {Esignature:[id number]}

## 2022-11-28 NOTE — PROGRESS NOTES
Updated patients POA/dtr Demi Baig. Assessment completed and documented. VSS, BP low, see MD pal aware. Held HTN medications this morning- MD aware. Alert to self. Drowsy/lethargic this morning during medications and assessment. Anxious when she wakes up. Q2turn with pillow support. Takes pills crushed with applesauce. IV mag replaced today. Denies pain. Bed locked and in lowest position. Bedside table and call light within reach. Denies further needs at this time.

## 2022-11-28 NOTE — PROGRESS NOTES
[facility]  Nephrology Progress Note           CC: EDOUARD on CKD  HPI as of 11/27  80 y.o. yo female with PMH of chronic kidney disease stage III, hypertension, CHF with preserved EF, dementia, chronic lymphedema who is admitted for abdominal pain palpitation and fall  Patient was brought from nursing care facility to emergency room due to abdominal pain and not having bowel movement for 3 days. Patient also had palpitations at the nursing home.   She was recently started on ciprofloxacin for presumed UTI  Her creatinine at nursing care facility was 1.9 middle of November  Patient has dementia and is not able to give meaningful history  SUBJECTIVE  No CP or SOB    SOC: no visitors      Scheduled Meds:   cefTRIAXone (ROCEPHIN) IV  1,000 mg IntraVENous Q24H    labetalol  50 mg Oral BID    hydrALAZINE  50 mg Oral TID    allopurinol  100 mg Oral Daily    aspirin  81 mg Oral Daily    atorvastatin  40 mg Oral Nightly    bisacodyl  5 mg Oral Nightly    citalopram  10 mg Oral Daily    doxazosin  1 mg Oral Nightly    DULoxetine  30 mg Oral Daily    [Held by provider] furosemide  40 mg Oral Daily    gabapentin  100 mg Oral TID    guanFACINE  1 mg Oral QPM    isosorbide mononitrate  30 mg Oral Daily    nystatin   Topical BID    rivaroxaban  10 mg Oral Dinner    spironolactone  25 mg Oral Daily    sodium chloride flush  5-40 mL IntraVENous 2 times per day     Continuous Infusions:   dextrose 5 % and 0.9 % NaCl 75 mL/hr at 11/27/22 1630    sodium chloride       PRN Meds:LORazepam, sodium chloride flush, sodium chloride, ondansetron **OR** ondansetron, acetaminophen **OR** acetaminophen, dicyclomine      Objective:      Physical Exam  Wt Readings from Last 3 Encounters:   11/26/22 272 lb 11.3 oz (123.7 kg)   07/27/21 281 lb 4.8 oz (127.6 kg)   12/11/19 (!) 315 lb 0.6 oz (142.9 kg)     Temp Readings from Last 3 Encounters:   11/28/22 97.4 °F (36.3 °C) (Oral)   07/27/21 97.9 °F (36.6 °C) (Skin)   06/15/21 98.8 °F (37.1 °C) (Infrared)     BP Readings from Last 3 Encounters:   11/28/22 109/72   07/27/21 133/73   09/10/19 137/67     Pulse Readings from Last 3 Encounters:   11/28/22 97   09/10/19 70   06/11/19 68    Gen: alert, awake  Neck: No JVD  Skin: Unremarkable  Cardiovascular:  S1, S2 without m/r/g   Respiratory: CTA B without w/r/r; respiratory effort normal  Abdomen:  soft, nt, nd,   Extremities: Chronic lymphedema of lower extremities  Neuro/Psy: AAoriented times 1 ; moves all 4 ext        Lab Review   Lab Results   Component Value Date    WBC 10.8 11/27/2022    HGB 12.7 11/27/2022    HCT 38.6 11/27/2022    MCV 96.6 11/27/2022     11/27/2022     Lab Results   Component Value Date/Time     11/28/2022 08:26 AM    K 3.7 11/28/2022 08:26 AM     11/28/2022 08:26 AM    CO2 24 11/28/2022 08:26 AM    BUN 38 11/28/2022 08:26 AM    CREATININE 3.1 11/28/2022 08:26 AM    GLUCOSE 112 11/28/2022 08:26 AM    CALCIUM 7.7 11/28/2022 08:26 AM            Patient Active Problem List    Diagnosis Date Noted    Constipation 11/26/2022    Elevated serum creatinine 11/26/2022    Visit for wound check 03/05/2019    Basal cell carcinoma of left eyelid 02/19/2019    SOB (shortness of breath)     Chronic diastolic congestive heart failure (HCC)     Rhabdomyolysis 02/20/2018    Cellulitis 12/01/2017    EDOUARD (acute kidney injury) ([facility] [address] 12/01/2017    CKD (chronic kidney disease), stage III ([address] 12/01/2017    Essential hypertension 12/01/2017    CHF (congestive heart failure) ([facility] [address] 12/01/2017    Leukocytosis 12/01/2017    Primary osteoarthritis of right knee 10/01/2015    Knee pain, right 10/01/2015    Intractable abdominal pain     Acute pancreatitis        ASSESSMENT AND PLAN     #EDOUARD on CKD in the setting of possible UTI and decreased p.o. intake and relative hypotension.   Patient is on torsemide, metolazone and spironolactone as well               Chronic kidney disease stage IIIb with baseline creatinine of 1.8-1.9, follows up with  [address] IVF, AVOID NEPHROTOXIC AGENTS AS POSSIBLE  #Chronic diastolic CHF based on echocardiogram from 2018, patient had grade 1 diastolic dysfunction               She also has chronic lymphedema and uses pump along with diuretics as above  #Dementia  #UTI  #HTN- BP low, holding parameters on BP meds  -stop the spironolactone today

## 2022-11-28 NOTE — PROGRESS NOTES
Shift assessment completed. Pt A&O to self only. VSS. Medications admin per MAR. Pt w/little interest in PO intake; pt assist to feed. Pt assisted w/turning and personal care. Denies any needs at this time. Bed locked and in lowest position. Call light within reach. Will continue to monitor.  Electronically signed by Emeka Moya RN on 11/27/2022 at 10:11 PM

## 2022-11-28 NOTE — PLAN OF CARE
Problem: Skin/Tissue Integrity  Goal: Absence of new skin breakdown  Description: 1. Monitor for areas of redness and/or skin breakdown  2. Assess vascular access sites hourly  3. Every 4-6 hours minimum:  Change oxygen saturation probe site  4. Every 4-6 hours:  If on nasal continuous positive airway pressure, respiratory therapy assess nares and determine need for appliance change or resting period.   11/27/2022 2210 by Vee Kaplan RN  Outcome: Progressing  11/27/2022 1602 by Vee Kaplan RN  Outcome: Progressing     Problem: Safety - Adult  Goal: Free from fall injury  11/27/2022 2210 by Vee Kaplan RN  Outcome: Progressing  11/27/2022 1602 by Vee Kaplan RN  Outcome: Progressing     Problem: ABCDS Injury Assessment  Goal: Absence of physical injury  11/27/2022 2210 by Vee Kaplan RN  Outcome: Progressing  11/27/2022 1602 by Vee Kaplan RN  Outcome: Progressing     Problem: Pain  Goal: Verbalizes/displays adequate comfort level or baseline comfort level  11/27/2022 2210 by Vee Kaplan RN  Outcome: Progressing  11/27/2022 1602 by Vee Kaplan RN  Outcome: Progressing

## 2022-11-29 PROBLEM — F03.911 DEMENTIA WITH AGITATION: Status: ACTIVE | Noted: 2022-11-29

## 2022-11-29 PROBLEM — N39.0 COMPLICATED UTI (URINARY TRACT INFECTION): Status: ACTIVE | Noted: 2022-11-29

## 2022-11-29 PROBLEM — G93.41 ACUTE METABOLIC ENCEPHALOPATHY: Status: ACTIVE | Noted: 2022-11-29

## 2022-11-29 LAB
ANION GAP SERPL CALCULATED.3IONS-SCNC: 9 MMOL/L (ref 3–16)
BUN BLDV-MCNC: 28 MG/DL (ref 7–20)
CALCIUM SERPL-MCNC: 7.9 MG/DL (ref 8.3–10.6)
CHLORIDE BLD-SCNC: 108 MMOL/L (ref 99–110)
CO2: 22 MMOL/L (ref 21–32)
CREAT SERPL-MCNC: 2 MG/DL (ref 0.6–1.2)
GFR SERPL CREATININE-BSD FRML MDRD: 23 ML/MIN/{1.73_M2}
GLUCOSE BLD-MCNC: 105 MG/DL (ref 70–99)
MAGNESIUM: 1.8 MG/DL (ref 1.8–2.4)
ORGANISM: ABNORMAL
ORGANISM: ABNORMAL
POTASSIUM REFLEX MAGNESIUM: 3.3 MMOL/L (ref 3.5–5.1)
SODIUM BLD-SCNC: 139 MMOL/L (ref 136–145)
URINE CULTURE, ROUTINE: ABNORMAL
URINE CULTURE, ROUTINE: ABNORMAL

## 2022-11-29 PROCEDURE — 6370000000 HC RX 637 (ALT 250 FOR IP): Performed by: INTERNAL MEDICINE

## 2022-11-29 PROCEDURE — 6360000002 HC RX W HCPCS: Performed by: INTERNAL MEDICINE

## 2022-11-29 PROCEDURE — 2580000003 HC RX 258: Performed by: INTERNAL MEDICINE

## 2022-11-29 PROCEDURE — 83735 ASSAY OF MAGNESIUM: CPT

## 2022-11-29 PROCEDURE — 36415 COLL VENOUS BLD VENIPUNCTURE: CPT

## 2022-11-29 PROCEDURE — 1200000000 HC SEMI PRIVATE

## 2022-11-29 PROCEDURE — 96361 HYDRATE IV INFUSION ADD-ON: CPT

## 2022-11-29 PROCEDURE — G0378 HOSPITAL OBSERVATION PER HR: HCPCS

## 2022-11-29 PROCEDURE — 97166 OT EVAL MOD COMPLEX 45 MIN: CPT

## 2022-11-29 PROCEDURE — 97530 THERAPEUTIC ACTIVITIES: CPT

## 2022-11-29 PROCEDURE — 97535 SELF CARE MNGMENT TRAINING: CPT

## 2022-11-29 PROCEDURE — 99223 1ST HOSP IP/OBS HIGH 75: CPT | Performed by: INTERNAL MEDICINE

## 2022-11-29 PROCEDURE — 2580000003 HC RX 258: Performed by: NURSE PRACTITIONER

## 2022-11-29 PROCEDURE — 6370000000 HC RX 637 (ALT 250 FOR IP): Performed by: NURSE PRACTITIONER

## 2022-11-29 PROCEDURE — 80048 BASIC METABOLIC PNL TOTAL CA: CPT

## 2022-11-29 PROCEDURE — 96366 THER/PROPH/DIAG IV INF ADDON: CPT

## 2022-11-29 PROCEDURE — 96376 TX/PRO/DX INJ SAME DRUG ADON: CPT

## 2022-11-29 PROCEDURE — 6360000002 HC RX W HCPCS: Performed by: NURSE PRACTITIONER

## 2022-11-29 PROCEDURE — 97162 PT EVAL MOD COMPLEX 30 MIN: CPT

## 2022-11-29 RX ORDER — POTASSIUM CHLORIDE 20 MEQ/1
40 TABLET, EXTENDED RELEASE ORAL ONCE
Status: COMPLETED | OUTPATIENT
Start: 2022-11-29 | End: 2022-11-29

## 2022-11-29 RX ORDER — TRAMADOL HYDROCHLORIDE 50 MG/1
50 TABLET ORAL EVERY 6 HOURS PRN
Status: DISCONTINUED | OUTPATIENT
Start: 2022-11-29 | End: 2022-12-07 | Stop reason: HOSPADM

## 2022-11-29 RX ORDER — LORAZEPAM 2 MG/ML
0.5 INJECTION INTRAMUSCULAR EVERY 6 HOURS PRN
Status: DISCONTINUED | OUTPATIENT
Start: 2022-11-29 | End: 2022-12-03

## 2022-11-29 RX ADMIN — LORAZEPAM 0.5 MG: 2 INJECTION INTRAMUSCULAR at 13:12

## 2022-11-29 RX ADMIN — RIVAROXABAN 10 MG: 10 TABLET, FILM COATED ORAL at 18:35

## 2022-11-29 RX ADMIN — SODIUM CHLORIDE, PRESERVATIVE FREE 10 ML: 5 INJECTION INTRAVENOUS at 20:24

## 2022-11-29 RX ADMIN — CITALOPRAM HYDROBROMIDE 10 MG: 20 TABLET ORAL at 11:18

## 2022-11-29 RX ADMIN — ASPIRIN 81 MG 81 MG: 81 TABLET ORAL at 10:26

## 2022-11-29 RX ADMIN — CEFTRIAXONE SODIUM 1000 MG: 1 INJECTION, POWDER, FOR SOLUTION INTRAMUSCULAR; INTRAVENOUS at 06:35

## 2022-11-29 RX ADMIN — SODIUM CHLORIDE, PRESERVATIVE FREE 10 ML: 5 INJECTION INTRAVENOUS at 11:19

## 2022-11-29 RX ADMIN — LORAZEPAM 0.5 MG: 2 INJECTION INTRAMUSCULAR at 20:13

## 2022-11-29 RX ADMIN — ACETAMINOPHEN 650 MG: 325 TABLET ORAL at 20:12

## 2022-11-29 RX ADMIN — POTASSIUM CHLORIDE 40 MEQ: 1500 TABLET, EXTENDED RELEASE ORAL at 20:13

## 2022-11-29 RX ADMIN — ISOSORBIDE MONONITRATE 30 MG: 30 TABLET, EXTENDED RELEASE ORAL at 10:27

## 2022-11-29 RX ADMIN — ATORVASTATIN CALCIUM 40 MG: 40 TABLET, FILM COATED ORAL at 20:13

## 2022-11-29 RX ADMIN — ALLOPURINOL 100 MG: 100 TABLET ORAL at 10:26

## 2022-11-29 RX ADMIN — LABETALOL HYDROCHLORIDE 50 MG: 100 TABLET, FILM COATED ORAL at 10:26

## 2022-11-29 RX ADMIN — ACETAMINOPHEN 650 MG: 325 TABLET ORAL at 14:02

## 2022-11-29 RX ADMIN — LORAZEPAM 0.5 MG: 2 INJECTION INTRAMUSCULAR at 03:38

## 2022-11-29 RX ADMIN — HYDRALAZINE HYDROCHLORIDE 50 MG: 50 TABLET, FILM COATED ORAL at 10:26

## 2022-11-29 RX ADMIN — TRAMADOL HYDROCHLORIDE 50 MG: 50 TABLET, COATED ORAL at 16:24

## 2022-11-29 RX ADMIN — DOXAZOSIN 1 MG: 2 TABLET ORAL at 20:12

## 2022-11-29 RX ADMIN — LABETALOL HYDROCHLORIDE 50 MG: 100 TABLET, FILM COATED ORAL at 20:12

## 2022-11-29 RX ADMIN — NYSTATIN: 100000 CREAM TOPICAL at 12:42

## 2022-11-29 RX ADMIN — CEFEPIME 1000 MG: 1 INJECTION, POWDER, FOR SOLUTION INTRAMUSCULAR; INTRAVENOUS at 14:13

## 2022-11-29 RX ADMIN — DULOXETINE HYDROCHLORIDE 30 MG: 30 CAPSULE, DELAYED RELEASE ORAL at 10:26

## 2022-11-29 RX ADMIN — NYSTATIN: 100000 CREAM TOPICAL at 20:23

## 2022-11-29 RX ADMIN — PIPERACILLIN AND TAZOBACTAM 3375 MG: 3; .375 INJECTION, POWDER, LYOPHILIZED, FOR SOLUTION INTRAVENOUS at 18:30

## 2022-11-29 RX ADMIN — HYDRALAZINE HYDROCHLORIDE 50 MG: 50 TABLET, FILM COATED ORAL at 20:13

## 2022-11-29 RX ADMIN — BISACODYL 5 MG: 5 TABLET, COATED ORAL at 20:13

## 2022-11-29 RX ADMIN — HYDRALAZINE HYDROCHLORIDE 50 MG: 50 TABLET, FILM COATED ORAL at 14:02

## 2022-11-29 ASSESSMENT — PAIN SCALES - GENERAL
PAINLEVEL_OUTOF10: 8
PAINLEVEL_OUTOF10: 8

## 2022-11-29 NOTE — PROGRESS NOTES
Occupational Therapy  Facility/Department: [facility] C3 TELE/MED SURG/ONC  Occupational Therapy Initial Assessment & Treatment    Name: Franco Jeter  : 3/16/1930  MRN: [id number]  Date of Service: 2022    Discharge Recommendations:  Subacute/Skilled Nursing Facility  OT Equipment Recommendations  Other: Defer to facility to obtain     Patient Diagnosis(es): The primary encounter diagnosis was Constipation, unspecified constipation type. A diagnosis of EDOUARD (acute kidney injury) ([facility] was also pertinent to this visit. Past Medical History:  has a past medical history of Arthritis, Basal cell carcinoma of left eyelid, Bladder leak, Cellulitis, Chronic back pain, Chronic diastolic CHF (congestive heart failure) ([facility] [address] CKD (chronic kidney disease), Dementia ([facility] [address] Depression, HTN (hypertension), Tinnitus, and Wears glasses. Past Surgical History:  has a past surgical history that includes Appendectomy; Heel spur surgery; Cataract removal; Cholecystectomy; bladder suspension (); Breast surgery; and Mohs surgery (2019). Assessment   Performance deficits / Impairments: Decreased functional mobility ; Decreased ADL status; Decreased strength;Decreased safe awareness;Decreased cognition;Decreased endurance;Decreased balance;Decreased high-level IADLs;Decreased vision/visual deficit  Assessment: Pt is a 79 yo F who [facility] w/ constipation. PTA, pt residing at [facility] w/ [facility] services. Daughter provides majority of history d/t cognition; prior to about a month ago, pt was able to transfers and amb short distances IND using RW. About a month ago, pt had COVID and has requiring increased assist with all ADLs and x2 assist for transfers since. Pt currently req maxAx2 for rolling in bed and positioning. Pt able to complete feeding w/ SBA and set-up seated in bed, cues for locating fruit on plate 2/2 macular degneration. Pt would  benefit from continued skilled OT to addres current deficits. Rec return to SNF at d/c.   Prognosis: Fair  Decision Making: Medium Complexity  REQUIRES OT FOLLOW-UP: Yes  Activity Tolerance  Activity Tolerance: Patient limited by fatigue;Treatment limited secondary to decreased cognition;Treatment limited secondary to medical complications (free text) (pt recently given Ativan dose prior to start of therapy eval)        AM-PAC Daily Activity Inpatient   How much help for putting on and taking off regular lower body clothing?: Total  How much help for Bathing?: Total  How much help for Toileting?: Total  How much help for putting on and taking off regular upper body clothing?: A Lot  How much help for taking care of personal grooming?: A Little  How much help for eating meals?: A Little  AM-PAC Inpatient Daily Activity Raw Score: 11  AM-PAC Inpatient ADL T-Scale Score : 29.04  ADL Inpatient CMS 0-100% Score: 70.42  ADL Inpatient CMS G-Code Modifier : CL     Plan   Occupational Therapy Plan  Times Per Week: 3-5x/wk  Current Treatment Recommendations: Strengthening, Balance training, Functional mobility training, Endurance training, Safety education & training, Patient/Caregiver education & training, Self-Care / ADL, Home management training, Pain management, Cognitive reorientation, Positioning     Restrictions  Restrictions/Precautions  Restrictions/Precautions: Fall Risk, Bed Alarm  Position Activity Restriction  Other position/activity restrictions: thao, IV,    Subjective   General  Chart Reviewed: Yes, Orders, Progress Notes, History and Physical  Patient assessed for rehabilitation services?: Yes  Subjective  Subjective: pt resting in bed, agreeable to OT/PT eval  Pain: pt stated she was not in pain, c/o pain during rolling in bed but did not formally rate or describe     Social/Functional History  Social/Functional History  Type of Home: Assisted living ([facility])  Home Layout: One level  Home Access: Level entry  Bathroom Shower/Tub: Walk-in shower, Shower chair with back  [employer]: Handicap height  Bathroom Equipment: Grab bars in shower, Grab bars around toilet  Bathroom Accessibility: Wheelchair accessible  Home Equipment: Rose Mary Mitchell, rolling, Alert Button  Has the patient had two or more falls in the past year or any fall with injury in the past year?: Yes (about 4 falls within the last year; spread out over the year, 2 within the last month)  ADL Assistance: Needs assistance (Assist with bathing, dressing; Able to get to/from bathroom IND)  Homemaking Responsibilities: No ([facility] bring meal to her or take her to dinning room)  Ambulation Assistance: Independent (For the last month, pt has required Ax2 for transfers with RW; prior to a month ago, pt IND with RW)  Transfer Assistance: Independent  Active : No  Mode of Transportation: Other ([facility] trans[facility])  Additional Comments: Prior to a month ago, pt IND with transfers to/from w/c and ambulating with therapy. About a month ago pt got COVID and has needed increased assist with all mobility/transfers/ADL and has had increased confusion. Objective   Heart Rate: (!) 111  Heart Rate Source: Monitor  BP: (!) 141/74  MAP (Calculated): 96  Resp: 20  SpO2: 95 %  O2 Device: None (Room air)       Observation/Palpation  Edema: BLE swelling 2/2 lymphedema  Safety Devices  Type of Devices: Patient at risk for falls; All fall risk precautions in place; Left in bed;Bed alarm in place;Call light within reach;Gait belt;Nurse notified (Pt lying on R side with wedge and pillows for support)  Restraints  Restraints Initially in Place: No    Bed Mobility Training  Bed Mobility Training: Yes  Interventions: Verbal cues; Safety awareness training  Rolling: Maximum assistance;Assist X2 (Pt roll both directions with max VC for technique and max(A)X2 at trunk and legs; Once rolled, pt able to hold onto bedrail for support as RN performs gema-care and skin check.  Pt tearful during mobility due to pain, but unable to state where.)     AROM: Generally decreased, functional  PROM: Generally decreased, functional  Strength: Generally decreased, functional  Coordination: Generally decreased, functional    ADL  Feeding: Stand by assistance;Setup;Verbal cueing; Increased time to complete; Beverage management (seated in bed)  Grooming: Stand by assistance;Verbal cueing;Setup (seated in bed)  Grooming Skilled Clinical Factors: wash face/mouth  Toileting: Dependent/Total  Toileting Skilled Clinical Factors: thao     Activity Tolerance  Activity Tolerance: Patient tolerated evaluation without incident;Patient limited by fatigue;Treatment limited secondary to decreased cognition;Patient limited by endurance     Vision  Vision: Impaired (Macular degeneration R eye, L eye also limited)  Hearing  Hearing: Exceptions to [facility]  Hearing Exceptions: Hard of hearing/hearing concerns; No hearing aid    Cognition  Overall Cognitive Status: Exceptions  Arousal/Alertness: Delayed responses to stimuli  Following Commands: Follows one step commands with increased time; Follows one step commands with repetition  Attention Span: Attends with cues to redirect  Memory: Decreased recall of recent events;Decreased short term memory;Decreased recall of biographical Information;Decreased recall of precautions;Decreased long term memory (dementia at baseline)  Safety Judgement: Decreased awareness of need for assistance  Problem Solving: Decreased awareness of errors;Assistance required to identify errors made;Assistance required to generate solutions;Assistance required to implement solutions;Assistance required to correct errors made  Insights: Not aware of deficits  Initiation: Requires cues for some  Sequencing: Requires cues for some  Orientation  Overall Orientation Status: Impaired  Orientation Level: Oriented to time;Oriented to person;Disoriented to place; Disoriented to situation      Education Given To: Family; Patient (daughter)  Education Provided: Role of Therapy;Plan of Care;Precautions; Family Education  Education Method: Demonstration;Verbal  Barriers to Learning: Cognition;Vision (for pt)  Education Outcome: Unable to verbalize; Unable to demonstrate understanding (for pt)    Disease Specific Education: Pt educated on importance of OOB mobility, prevention of complications of bedrest, and general safety during hospitalization. Pt verbalized understanding , cont ed needed     Goals  Short Term Goals  Time Frame for Short Term Goals: 1 week (12/6) unless otherwise noted  Short Term Goal 1: Pt will complete sup>sit transfer w/ modAx2  Short Term Goal 2: Pt will complete bed>chair transfer w/ modAx2 and LRAD  Short Term Goal 3: Pt will complete seated grooming task w/ SBA and set-up  Short Term Goal 4: Pt will complete toileting w/ Gladis and LRAD  Patient Goals   Patient goals : pt unable to state       Therapy Time   Individual Concurrent Group Co-treatment   Time In 1341         Time Out 1422         Minutes 41         Timed Code Treatment Minutes: 31 Minutes (10 min eval)     If pt is unable to be seen after this session, please let this note serve as discharge summary. Please see case management note for discharge disposition. Thank you.      Kia Lowery, OTR/L

## 2022-11-29 NOTE — PLAN OF CARE
Problem: Safety - Adult  Goal: Free from fall injury  11/29/2022 0115 by Magalie Cottrell RN  Outcome: Progressing  Flowsheets (Taken 11/27/2022 1163 by Dalton Pritchard RN)  Free From Fall Injury: Instruct family/caregiver on patient safety  11/28/2022 1215 by Nae Sanabria RN  Outcome: Progressing     Problem: ABCDS Injury Assessment  Goal: Absence of physical injury  11/29/2022 0115 by Magalie Cottrell RN  Outcome: Progressing  11/28/2022 1215 by Nae Sanabria RN  Outcome: Progressing     Problem: Pain  Goal: Verbalizes/displays adequate comfort level or baseline comfort level  Flowsheets (Taken 11/29/2022 0115)  Verbalizes/displays adequate comfort level or baseline comfort level:   Encourage patient to monitor pain and request assistance   Assess pain using appropriate pain scale   Administer analgesics based on type and severity of pain and evaluate response

## 2022-11-29 NOTE — CONSULTS
Infectious Diseases   Consult Note      Reason for Consult:  complicated UTI   Requesting Physician:   Dr. Italia Baptiste      Date of Admission: 11/26/2022  Subjective:   CHIEF COMPLAINT:  none given       HPI:    Sahra Anders is a 80yoF with history of dementia, CHF, CKD, HTN                ED 11/26/22 - constipation and abd pain; also found on the floor, presumed to have fallen. She was receiving cipro PTA for UTI. No additional details available   WBC was wnl at 8  Creatinine 2.5, LFTs nl   Lipase elevated   UA with pyuria  CT ap showed constipation, diverticulosis. Was given an enema in ED and admitted for management of constipation and EDOUARD     Urine culture is positive for E faecalis and lower level growth of PSa    She is afebrile   On RA   Per RN, cries a lot  Colindres in place with poor UOP  EDOUARD is improving     She does not contribute to the history        Current abx:  Cefepime 1g q12 s 11/29  Rocephin 11/27-11/29       Past Surgical History:       Diagnosis Date    Arthritis     Basal cell carcinoma of left eyelid 02/19/2019    Bladder leak     Cellulitis     Chronic back pain     Chronic diastolic CHF (congestive heart failure) ([address]     see [place] [facility], follows Manasa Bridges NP    CKD (chronic kidney disease)     Dementia ([facility] [address]     Depression     HTN (hypertension) 12/01/2017    Tinnitus     Wears glasses          Procedure Laterality Date    APPENDECTOMY      BLADDER SUSPENSION  2005    BREAST SURGERY      cyst left breast    CATARACT REMOVAL      both eyes    CHOLECYSTECTOMY      HEEL SPUR SURGERY      left heel and cyst from top of foot    MOHS SURGERY  02/19/2019    L medial lower eyelid       Social History:    TOBACCO:   reports that she has never smoked. She has never used smokeless tobacco.  ETOH:   reports no history of alcohol use. There is no history of illicit drug use or other significant epidemiologic exposures.       Family History:       Family history unknown: Yes       Current Medications: Current Facility-Administered Medications: LORazepam (ATIVAN) injection 0.5 mg, 0.5 mg, IntraVENous, Q6H PRN  cefepime (MAXIPIME) 1000 mg IVPB minibag, 1,000 mg, IntraVENous, Q12H  traMADol (ULTRAM) tablet 50 mg, 50 mg, Oral, Q6H PRN  labetalol (NORMODYNE) tablet 50 mg, 50 mg, Oral, BID  hydrALAZINE (APRESOLINE) tablet 50 mg, 50 mg, Oral, TID  dextrose 5 % and 0.9 % sodium chloride infusion, , IntraVENous, Continuous  allopurinol (ZYLOPRIM) tablet 100 mg, 100 mg, Oral, Daily  aspirin chewable tablet 81 mg, 81 mg, Oral, Daily  atorvastatin (LIPITOR) tablet 40 mg, 40 mg, Oral, Nightly  bisacodyl (DULCOLAX) EC tablet 5 mg, 5 mg, Oral, Nightly  citalopram (CELEXA) tablet 10 mg, 10 mg, Oral, Daily  doxazosin (CARDURA) tablet 1 mg, 1 mg, Oral, Nightly  DULoxetine (CYMBALTA) extended release capsule 30 mg, 30 mg, Oral, Daily  [Held by provider] furosemide (LASIX) tablet 40 mg, 40 mg, Oral, Daily  guanFACINE (TENEX) tablet 1 mg, 1 mg, Oral, QPM  isosorbide mononitrate (IMDUR) extended release tablet 30 mg, 30 mg, Oral, Daily  nystatin (MYCOSTATIN) cream, , Topical, BID  rivaroxaban (XARELTO) tablet 10 mg, 10 mg, Oral, Dinner  sodium chloride flush 0.9 % injection 5-40 mL, 5-40 mL, IntraVENous, 2 times per day  sodium chloride flush 0.9 % injection 5-40 mL, 5-40 mL, IntraVENous, PRN  0.9 % sodium chloride infusion, , IntraVENous, PRN  ondansetron (ZOFRAN-ODT) disintegrating tablet 4 mg, 4 mg, Oral, Q8H PRN **OR** ondansetron (ZOFRAN) injection 4 mg, 4 mg, IntraVENous, Q6H PRN  acetaminophen (TYLENOL) tablet 650 mg, 650 mg, Oral, Q6H PRN **OR** acetaminophen (TYLENOL) suppository 650 mg, 650 mg, Rectal, Q6H PRN  dicyclomine (BENTYL) tablet 20 mg, 20 mg, Oral, TID PRN      Allergies   Allergen Reactions    Bee Venom Swelling    Nutritional Supplements Swelling    Oxycodone     Codeine Nausea And Vomiting     Severe B/P drop    Hydrocodone-Acetaminophen Nausea And Vomiting    Morphine Nausea And Vomiting     Severe drop in B/P    Percocet [Oxycodone-Acetaminophen] Nausea And Vomiting        REVIEW OF SYSTEMS:    Unable to obtain       Objective:   PHYSICAL EXAM:      VITALS:  BP (!) 141/74   Pulse (!) 111   Temp 97.4 °F (36.3 °C) (Oral)   Resp 20   Ht 5' 5\" (1.651 m) Comment: pt stated  Wt 272 lb 11.3 oz (123.7 kg)   SpO2 95%   BMI 45.38 kg/m²      24HR INTAKE/OUTPUT:    Intake/Output Summary (Last 24 hours) at 11/29/2022 1650  Last data filed at 11/29/2022 0645  Gross per 24 hour   Intake 1127.81 ml   Output 250 ml   Net 877.81 ml     CONSTITUTIONAL:  morbidly obese  Agitated but in no acute distress  HEENT: NCAT, PERRL, EOMI. Sclera white, conjunctiva full. OP with moist mucosal membranes, no thrush, tongue protrudes midline  NECK:  Supple, symmetrical, trachea midline, no adenopathy  LUNGS:  no increased work of breathing   CTA reji without W/R/R  CARDIOVASCULAR:  RRR without murmur  ABDOMEN:  normal bowel sounds, soft, NT  No SP tenderness  MUSCULOSKELETAL: No obvious misalignment or effusion of the joints. No clubbing, cyanosis of the digits. SKIN:  normal skin color, texture, turgor and no redness, warmth, or swelling.  No palpable nodules or stigmata of embolic phenomenon  NEUROLOGIC: nonfocal exam  ACCESS:  PIV in place       DATA:    Old records have been reviewed    CBC:  Recent Labs     11/27/22  1433   WBC 10.8   RBC 4.00   HGB 12.7   HCT 38.6      MCV 96.6   MCH 31.6   MCHC 32.8   RDW 14.7      BMP:  Recent Labs     11/27/22  1433 11/28/22  0826 11/29/22  0558    141 139   K 4.3 3.7 3.3*    108 108   CO2 23 24 22   BUN 40* 38* 28*   CREATININE 3.4* 3.1* 2.0*   CALCIUM 8.6 7.7* 7.9*   GLUCOSE 96 112* 105*        Cultures:   11/26 UC E faecalis and Psa   Enterococcus faecalis  Antibiotic Interpretation Microscan  Method Status    ampicillin Sensitive <=2 mcg/mL BACTERIAL SUSCEPTIBILITY PANEL BY SAGE     nitrofurantoin Sensitive <=16 mcg/mL BACTERIAL SUSCEPTIBILITY PANEL BY SAGE     tetracycline Resistant >=16 mcg/mL BACTERIAL SUSCEPTIBILITY PANEL BY SAGE     vancomycin Sensitive 1 mcg/mL BACTERIAL SUSCEPTIBILITY PANEL BY SAGE       Pseudomonas aeruginosa (2)    Antibiotic Interpretation Microscan  Method Status    cefepime Sensitive 8 mcg/mL BACTERIAL SUSCEPTIBILITY PANEL BY SAGE     ciprofloxacin Sensitive <=1 mcg/mL BACTERIAL SUSCEPTIBILITY PANEL BY SAGE     gentamicin Sensitive <=4 mcg/mL BACTERIAL SUSCEPTIBILITY PANEL BY SAGE     meropenem Sensitive <=1 mcg/mL BACTERIAL SUSCEPTIBILITY PANEL BY SAGE     piperacillin-tazobactam Sensitive <=16 mcg/mL BACTERIAL SUSCEPTIBILITY PANEL BY SAGE     tobramycin Sensitive <=4 mcg/mL BACTERIAL SUSCEPTIBILITY PANEL BY SAGE     levofloxacin Intermediate 2.0 ug/ml BACTERIAL SUSCEPTIBILITY PANEL BY E-TEST          Radiology Review:  All pertinent images / reports were reviewed as a part of this visit. CT ap 11/26/22  Impression   1. Constipation with large amount of stool within the sigmoid colon and   rectum. Otherwise, no acute intra-abdominal or pelvic abnormality with   limitations for a noncontrast CT scan. 2. Diverticulosis without obvious inflammation.        Assessment:     Patient Active Problem List   Diagnosis    Intractable abdominal pain    Acute pancreatitis    Primary osteoarthritis of right knee    Knee pain, right    Cellulitis    EDOUARD (acute kidney injury) ([address]    CKD (chronic kidney disease), stage III ([facility])    Essential hypertension    CHF (congestive heart failure) ([facility])    Leukocytosis    Rhabdomyolysis    SOB (shortness of breath)    Chronic diastolic congestive heart failure ([address]    Basal cell carcinoma of left eyelid    Visit for wound check    Constipation    Elevated serum creatinine    Acute metabolic encephalopathy       Dementia with acute encephalopathy     Admitted with abd pain, constipation  Was being treated for UTI with po cipro at the time of admission     UTI  Growth of amp-S E feacalis and lower level growth of a sensitive Pseudomonas which may reflect partial treatment prior to admission     No abx allergies   QTc was 531 on 11/27/22      -for now, consolidate treatment to Zosyn to cover both organisms  -once she is clearly improving, can change to oral abx with amox to cover the Enterococcus and cipro to finish the Pseudomonal course, as long as the QTc remains stable on cipro   -total anticipated duration of abx 7d, today d1  -probiotic to try to mitigate risk of CDI   -no tests of cure for CDI  -ongoing discussion around [address]     Will follow        Compa Vazquez M.D. Thank you for the opportunity to participate in the care of your patient.     Please do not hesitate to contact me:   [phone] office

## 2022-11-29 NOTE — PROGRESS NOTES
Pt in bed and tearful. Family is at bedside. Call light within reach.  Pt waiting for breakfast.Yolanda Drew RN

## 2022-11-29 NOTE — CONSULTS
Consult placed    Who:Dr. Ignacio Bonds  Date:11/29/2022,  Time:2:53 PM        Electronically signed by Erica Collado on 11/29/2022 at 2:53 PM

## 2022-11-29 NOTE — PLAN OF CARE
PT eval complete. Increase function to baseline. Purse String (Simple) Text: Given the location of the defect and the characteristics of the surrounding skin a purse string closure was deemed most appropriate.  Undermining was performed circumfirentially around the surgical defect.  A purse string suture was then placed and tightened.

## 2022-11-29 NOTE — DISCHARGE INSTR - COC
Continuity of Care Form    Patient Name: Thais Oshea   :  3/16/1930  MRN:  [id number]    [address] [place] [address] date:  2022  Discharge date:      Code Status Order: Full Code   Advance Directives:     Admitting Physician:  Marco Henderson MD  PCP: Lyubov Walker MD    Discharging Nurse: [place]  [facility] Unit/Room#: 6411/6555-42  Discharging Unit Phone Number: [phone]    Emergency Contact:   Extended Emergency Contact Information  Primary Emergency Contact: [address] Phone: [phone]  Work Phone: [phone]  Relation: Child    Past Surgical History:  Past Surgical History:   Procedure Laterality Date    APPENDECTOMY      BLADDER SUSPENSION      BREAST SURGERY      cyst left breast    CATARACT REMOVAL      both eyes    CHOLECYSTECTOMY      HEEL SPUR SURGERY      left heel and cyst from top of foot    MOHS SURGERY  2019    L medial lower eyelid       Immunization History:   Immunization History   Administered Date(s) Administered    Influenza Vaccine, unspecified formulation 2012, 10/22/2013, 10/28/2015, 2016, 10/19/2017    Pneumococcal Conjugate 13-valent (Mauri Peacham) 10/28/2015    Pneumococcal Polysaccharide (Juicsepvh16) 2005    Tdap (Boostrix, Adacel) 2017       Active Problems:  Patient Active Problem List   Diagnosis Code    Intractable abdominal pain R10.9    Acute pancreatitis K85.90    Primary osteoarthritis of right knee M17.11    Knee pain, right M25.561    Cellulitis L03.90    EDOUARD (acute kidney injury) ([address] N17.9    CKD (chronic kidney disease), stage III ([address] N18.30    Essential hypertension I10    CHF (congestive heart failure) ([facility]) I50.9    Leukocytosis D72.829    Rhabdomyolysis M62.82    SOB (shortness of breath) R06.02    Chronic diastolic congestive heart failure ([address] I50.32    Basal cell carcinoma of left eyelid C44.1191    Visit for wound check Z51.89    Constipation K59.00    Elevated serum creatinine R79.89    Acute metabolic encephalopathy Q15.95       Isolation/Infection:   Isolation            No Isolation          Patient Infection Status       None to display            Nurse Assessment:  Last Vital Signs: BP (!) 158/85   Pulse 100   Temp 97.6 °F (36.4 °C) (Oral)   Resp 20   Ht 5' 5\" (1.651 m) Comment: pt stated  Wt 272 lb 11.3 oz (123.7 kg)   SpO2 95%   BMI 45.38 kg/m²     Last documented pain score (0-10 scale): Pain Level: 8  Last Weight:   Wt Readings from Last 1 Encounters:   11/26/22 272 lb 11.3 oz (123.7 kg)     Mental Status:  alert and oriented, confused at times    IV Access:  - None    Nursing Mobility/ADLs:  Walking   Dependent  Transfer  Dependent  Bathing  Dependent  Dressing  Dependent  Toileting  Dependent  Feeding  Independent  Med Admin  Assisted  Med Delivery   whole    Wound Care Documentation and Therapy:  Wound 12/01/17 Laceration Leg Right; Lower;Mid (Active)   Number of days: 6892       Wound 12/01/17 Abrasion(s) Leg Right; Lower (Active)   Number of days: 1823       Wound 12/01/17 Abrasion(s) Leg Left;Posterior; Lower (Active)   Number of days: 1823       Wound 02/01/17 Other (Comment) Leg Right;Lateral;Lower Length 1.5cm X 1cm Width with Depth of 2.5cm (Active)   Number of days: 2126       Wound 11/26/22 Buttocks Left (Active)   Wound Etiology Pressure Stage 2 11/29/22 1352   Dressing Status Other (Comment) 11/29/22 1352   Wound Cleansed Soap and water 11/27/22 2125   Dressing/Treatment Open to air 11/29/22 1352   Dressing Change Due 11/29/22 11/29/22 1352   Drainage Amount None 11/29/22 1352   Odor None 11/28/22 0904   Fany-wound Assessment Blanchable erythema;Fragile 11/27/22 2125   Number of days: 2       Wound 11/29/22 Arm Left; Lower;Proximal (Active)   Wound Etiology Skin Tear 11/29/22 1352   Dressing Status Intact 11/29/22 1352   Dressing/Treatment Foam 11/29/22 1352   Drainage Amount None 11/29/22 1352   Number of days: 0       Wound 11/29/22 Arm Lower;Right (Active)   Dressing Status Intact 11/29/22 1352   Dressing/Treatment Ace wrap; Foam 11/29/22 1352   Drainage Amount None 11/29/22 1352   Odor None 11/29/22 1352   Number of days: 0        Elimination:  Continence: Bowel: No  Bladder: No  Urinary Catheter: Removal Date 12/6/22 1400    Colostomy/Ileostomy/Ileal Conduit: No       Date of Last BM: 12/7/22    Intake/Output Summary (Last 24 hours) at 11/29/2022 1439  Last data filed at 11/29/2022 0645  Gross per 24 hour   Intake 1127.81 ml   Output 250 ml   Net 877.81 ml     I/O last 3 completed shifts: In: 1925.1 [P.O.:380; I.V.:1012.9; IV Piggyback:532.2]  Out: 675 [Urine:675]    Safety Concerns: At Risk for Falls    Impairments/Disabilities:      None    Nutrition Therapy:  Current Nutrition Therapy:   - Oral Diet:  Low Sodium (3-4gm)    Routes of Feeding: Oral  Liquids: Thin Liquids  Daily Fluid Restriction: yes - amount 1500ml  Last Modified Barium Swallow with Video (Video Swallowing Test): not done    Treatments at the Time of Hospital Discharge:   Respiratory Treatments:   Oxygen Therapy:  is not on home oxygen therapy. Ventilator:    - No ventilator support    Rehab Therapies: Physical Therapy and Occupational Therapy  Weight Bearing Status/Restrictions: No weight bearing restrictions  Other Medical Equipment (for information only, NOT a DME order):     Other Treatments:     Patient's personal belongings (please select all that are sent with patient):  None    RN SIGNATURE:  Electronically signed by Emory Palmer RN on 12/7/22 at 3:11 PM EST    CASE MANAGEMENT/SOCIAL WORK SECTION    Inpatient Status Date: 11/29/22    Readmission Risk Assessment Score:  Readmission Risk              Risk of Unplanned Readmission:  19           Discharging to Facility/ Agency    The [address] [place] [address] [address] [place] [address] [address] [phone]   / signature: Electronically signed by Araceli Prieto RN on 12/2/22 at 9:48 AM EST    PHYSICIAN SECTION    Prognosis: Fair    Condition at Discharge: Stable    Rehab Potential (if transferring to Rehab): Fair    Recommended Labs or Other Treatments After Discharge:     Physician Certification: I certify the above information and transfer of Casi Seay  is necessary for the continuing treatment of the diagnosis listed and that she requires [facility] for greater 30 days.      Update Admission H&P: No change in H&P    PHYSICIAN SIGNATURE:  Electronically signed by Cesar Cruz MD on 12/7/22 at 1:51 PM EST

## 2022-11-29 NOTE — CARE COORDINATION
Yasir reviewed. Spoke with Sammie Parker bedside. Discussed d/c plan of return to skilled at the [facility] at d/c. Provided IMM, patient upgraded to IP status today. Will continue to follow clinical progress for needs.  Amada Bourgeois RN

## 2022-11-29 NOTE — PROGRESS NOTES
4 Eyes Skin Assessment     The patient is being assess for  Shift Handoff    I agree that 2 RN's have performed a thorough Head to Toe Skin Assessment on the patient. ALL assessment sites listed below have been assessed. Areas assessed by both nurses: Rose Blevins / Horacio Will  [x]   Head, Face, and Ears   [x]   Shoulders, Back, and Chest  [x]   Arms, Elbows, and Hands   [x]   Coccyx, Sacrum, and IschIum  [x]   Legs, Feet, and Heels        Does the Patient have Skin Breakdown?   Yes LDA WOUND CARE was Initiated documentation include the Fany-wound, Wound Assessment, Measurements, Dressing Treatment, Drainage, and Color\",         Zach Prevention initiated:  Yes   Wound Care Orders initiated:  N/A      WOC nurse consulted for Pressure Injury (Stage 3,4, Unstageable, DTI, NWPT, and Complex wounds), New and Established Ostomies:  NA      Nurse 1 eSignature: Electronically signed by Rocky Otto RN on 11/29/22 at 1:11 AM EST    **SHARE this note so that the co-signing nurse is able to place an eSignature**    Nurse 2 eSignature: Electronically signed by Mary Hills RN on 11/29/22 at 5:02 AM EST

## 2022-11-29 NOTE — PROGRESS NOTES
Physical Therapy  Facility/Department: [facility] C3 TELE/MED SURG/ONC  Physical Therapy Initial Assessment/Treatment    Name: Casi Seay  : 3/16/1930  MRN: [id number]  Date of Service: 2022    Discharge Recommendations:  Subacute/Skilled Nursing Facility   PT Equipment Recommendations  Equipment Needed: No      Patient Diagnosis(es): The primary encounter diagnosis was Constipation, unspecified constipation type. A diagnosis of EDOUARD (acute kidney injury) ([facility] was also pertinent to this visit. Past Medical History:  has a past medical history of Arthritis, Basal cell carcinoma of left eyelid, Bladder leak, Cellulitis, Chronic back pain, Chronic diastolic CHF (congestive heart failure) ([facility] CKD (chronic kidney disease), Dementia ([facility] Depression, HTN (hypertension), Tinnitus, and Wears glasses. Past Surgical History:  has a past surgical history that includes Appendectomy; Heel spur surgery; Cataract removal; Cholecystectomy; bladder suspension (); Breast surgery; and Mohs surgery (2019). Assessment   Body Structures, Functions, Activity Limitations Requiring Skilled Therapeutic Intervention: Decreased functional mobility ; Decreased endurance;Decreased balance;Decreased strength;Decreased safe awareness; Increased pain;Decreased cognition  Assessment: Pt presents to [facility] with constipation. PTA, pt residing at [facility] at the [facility]. Daughter provides majority of history due to cognition; prior to about a month ago, pt was able to transfers and ambulation short distances IND using Rw. About a month ago, pt had COVID and has requiring increased assist with all ADL and transfers since. Pt currently requries max(A)x2 for rolling in bed and positioning. Pt demo minimal active ROM of BLE. Pt would  benefit from continued skilled PT to addres current deficits.  Recommend SNF upon d/c  Treatment Diagnosis: impaired functional mobility  Therapy Prognosis: Fair  Decision Making: Medium Complexity  Requires PT Follow-Up: Yes  Activity Tolerance  Activity Tolerance: Patient tolerated evaluation without incident;Patient limited by fatigue;Treatment limited secondary to decreased cognition;Patient limited by endurance     Plan   Physcial Therapy Plan  General Plan: 3-5 times per week  Current Treatment Recommendations: Strengthening, Balance training, Functional mobility training, Transfer training, Endurance training, Gait training, Pain management, Home exercise program, Safety education & training, Patient/Caregiver education & training, Therapeutic activities, Equipment evaluation, education, & procurement, Neuromuscular re-education  Safety Devices  Type of Devices: Patient at risk for falls, All fall risk precautions in place, Left in bed, Bed alarm in place, Call light within reach, Gait belt, Nurse notified (Pt lying on R side with wedge and pillows for support)     Restrictions  Restrictions/Precautions  Restrictions/Precautions: Fall Risk, Bed Alarm  Position Activity Restriction  Other position/activity restrictions: thaoBRAXTON,     Subjective   Pain: Denies pain at rest  General  Chart Reviewed: Yes  Patient assessed for rehabilitation services?: Yes  Response To Previous Treatment: Not applicable  Family / Caregiver Present: Yes (daughter)  Referring Practitioner: Render Boxer, MD  Referral Date : 11/29/22  Diagnosis: Constipation  Follows Commands: Impaired  General Comment  Comments: RN cleare pt for PT eval  Subjective  Subjective: Pt semi-supine in bed upon arrival, Oriented to person and time.  pt does not answer questions appropriately throughout evaluation and needs frequent repetition    Social/Functional History  Social/Functional History  Type of Home: Assisted living ([facility])  Home Layout: One level  Home Access: Level entry  Bathroom Shower/Tub: Walk-in shower, Shower chair with back  Bathroom Toilet: Handicap height  Bathroom Equipment: Grab bars in shower, Grab bars around toilet  Bathroom Accessibility: Wheelchair accessible  Home Equipment: Betty Mask, rolling, Alert Button  Has the patient had two or more falls in the past year or any fall with injury in the past year?: Yes (about 4 falls within the last year; spread out over the year, 2 within the last month)  ADL Assistance: Needs assistance (Assist with bathing, dressing; Able to get to/from bathroom IND)  Homemaking Responsibilities: No (FPC bring meal to her or take her to dinning room)  Ambulation Assistance: Independent (For the last month, pt has required Ax2 for transfers with RW; prior to a month ago, pt IND with RW)  Transfer Assistance: Independent  Active : No  Mode of Transportation: Other ([facility])  Additional Comments: Prior to a month ago, pt IND with transfers to/from w/c and ambulating with therapy. About a month ago pt got COVID and has needed increased assist with all mobility/transfers/ADL and has had increased confusion. Vision/Hearing  Vision  Vision: Impaired (Macular degeneration R eye, L eye also limited)  Hearing  Hearing: Exceptions to [facility]  Hearing Exceptions: Hard of hearing/hearing concerns; No hearing aid      Cognition   Orientation  Overall Orientation Status: Impaired  Orientation Level: Oriented to time;Oriented to person;Disoriented to place; Disoriented to situation     Objective   Heart Rate: (!) 111  Heart Rate Source: Monitor  BP: (!) 141/74  MAP (Calculated): 96  Resp: 20  SpO2: 95 %  O2 Device: None (Room air)    Gross Assessment  AROM: Grossly decreased, non-functional (minimal AROM at ankles, less at knees and hips)  Strength: Grossly decreased, non-functional  Tone: Normal  Sensation: Impaired     Bed Mobility Training  Bed Mobility Training: Yes  Interventions: Verbal cues; Safety awareness training  Rolling: Maximum assistance;Assist X2 (Pt roll both directions with max VC for technique and max(A)X2 at trunk and legs;  Once rolled, pt able to hold onto bedrail for support as RN performs gema-care and skin check. Pt tearful during mobility due to pain, but unable to state where. )      AM-PAC Score  AM-PAC Inpatient Mobility Raw Score : 7 (11/29/22 1544)  AM-PAC Inpatient T-Scale Score : 26.42 (11/29/22 1544)  Mobility Inpatient CMS 0-100% Score: 92.36 (11/29/22 1544)  Mobility Inpatient CMS G-Code Modifier : CM (11/29/22 1544)      Goals  Short Term Goals  Time Frame for Short Term Goals: 7 days (12/06/22) unless otherwise noted  Short Term Goal 1: Pt will perform bed mobility with mod(A)x2  Short Term Goal 2: Pt will tolerate sitting EOB x5 minutes with min(A)  Short Term Goal 3: Pt will tolerate transfer assessment  Short Term Goal 4: Pt will perform 10 reps of BLE exercise with AAROM-AROM by 12/3/22  Patient Goals   Patient Goals : none stated by pt due to cog       Education  Patient Education  Education Given To: Patient  Education Provided: Role of Therapy;Plan of Care;Transfer Training;Family Education  Education Method: Verbal  Barriers to Learning: Cognition  Education Outcome: Verbalized understanding;Continued education needed      Therapy Time   Individual Concurrent Group Co-treatment   Time In 1340         Time Out 1421         Minutes 41         Timed Code Treatment Minutes: 31 Minutes (10 min eval)     If pt is unable to be seen after this session, please let this note serve as discharge summary. Please see case management note for discharge disposition. Thank you.     Juan Ramon Kimbrough, PT

## 2022-11-30 LAB
ANION GAP SERPL CALCULATED.3IONS-SCNC: 11 MMOL/L (ref 3–16)
BUN BLDV-MCNC: 23 MG/DL (ref 7–20)
CALCIUM SERPL-MCNC: 8.3 MG/DL (ref 8.3–10.6)
CHLORIDE BLD-SCNC: 108 MMOL/L (ref 99–110)
CO2: 22 MMOL/L (ref 21–32)
CREAT SERPL-MCNC: 1.7 MG/DL (ref 0.6–1.2)
GFR SERPL CREATININE-BSD FRML MDRD: 28 ML/MIN/{1.73_M2}
GLUCOSE BLD-MCNC: 97 MG/DL (ref 70–99)
POTASSIUM REFLEX MAGNESIUM: 3.6 MMOL/L (ref 3.5–5.1)
SODIUM BLD-SCNC: 141 MMOL/L (ref 136–145)

## 2022-11-30 PROCEDURE — 6370000000 HC RX 637 (ALT 250 FOR IP): Performed by: INTERNAL MEDICINE

## 2022-11-30 PROCEDURE — 6370000000 HC RX 637 (ALT 250 FOR IP): Performed by: NURSE PRACTITIONER

## 2022-11-30 PROCEDURE — 6360000002 HC RX W HCPCS: Performed by: INTERNAL MEDICINE

## 2022-11-30 PROCEDURE — 99232 SBSQ HOSP IP/OBS MODERATE 35: CPT | Performed by: INTERNAL MEDICINE

## 2022-11-30 PROCEDURE — 2580000003 HC RX 258: Performed by: NURSE PRACTITIONER

## 2022-11-30 PROCEDURE — 97530 THERAPEUTIC ACTIVITIES: CPT

## 2022-11-30 PROCEDURE — 2580000003 HC RX 258: Performed by: INTERNAL MEDICINE

## 2022-11-30 PROCEDURE — 1200000000 HC SEMI PRIVATE

## 2022-11-30 PROCEDURE — 36415 COLL VENOUS BLD VENIPUNCTURE: CPT

## 2022-11-30 PROCEDURE — 80048 BASIC METABOLIC PNL TOTAL CA: CPT

## 2022-11-30 RX ORDER — SODIUM CHLORIDE, SODIUM GLUCONATE, SODIUM ACETATE, POTASSIUM CHLORIDE AND MAGNESIUM CHLORIDE 526; 502; 368; 37; 30 MG/100ML; MG/100ML; MG/100ML; MG/100ML; MG/100ML
1000 INJECTION, SOLUTION INTRAVENOUS CONTINUOUS
Status: DISCONTINUED | OUTPATIENT
Start: 2022-11-30 | End: 2022-12-01

## 2022-11-30 RX ADMIN — SODIUM CHLORIDE, SODIUM GLUCONATE, SODIUM ACETATE, POTASSIUM CHLORIDE AND MAGNESIUM CHLORIDE 1000 ML: 526; 502; 368; 37; 30 INJECTION, SOLUTION INTRAVENOUS at 18:38

## 2022-11-30 RX ADMIN — ATORVASTATIN CALCIUM 40 MG: 40 TABLET, FILM COATED ORAL at 20:08

## 2022-11-30 RX ADMIN — SODIUM CHLORIDE, PRESERVATIVE FREE 10 ML: 5 INJECTION INTRAVENOUS at 20:10

## 2022-11-30 RX ADMIN — HYDRALAZINE HYDROCHLORIDE 50 MG: 50 TABLET, FILM COATED ORAL at 09:56

## 2022-11-30 RX ADMIN — ALLOPURINOL 100 MG: 100 TABLET ORAL at 09:56

## 2022-11-30 RX ADMIN — CITALOPRAM HYDROBROMIDE 10 MG: 20 TABLET ORAL at 09:56

## 2022-11-30 RX ADMIN — HYDRALAZINE HYDROCHLORIDE 50 MG: 50 TABLET, FILM COATED ORAL at 14:40

## 2022-11-30 RX ADMIN — HYDRALAZINE HYDROCHLORIDE 50 MG: 50 TABLET, FILM COATED ORAL at 20:09

## 2022-11-30 RX ADMIN — BISACODYL 5 MG: 5 TABLET, COATED ORAL at 20:09

## 2022-11-30 RX ADMIN — PIPERACILLIN AND TAZOBACTAM 3375 MG: 3; .375 INJECTION, POWDER, LYOPHILIZED, FOR SOLUTION INTRAVENOUS at 01:07

## 2022-11-30 RX ADMIN — LABETALOL HYDROCHLORIDE 50 MG: 100 TABLET, FILM COATED ORAL at 09:56

## 2022-11-30 RX ADMIN — TRAMADOL HYDROCHLORIDE 50 MG: 50 TABLET, COATED ORAL at 20:08

## 2022-11-30 RX ADMIN — LABETALOL HYDROCHLORIDE 50 MG: 100 TABLET, FILM COATED ORAL at 20:08

## 2022-11-30 RX ADMIN — ACETAMINOPHEN 650 MG: 325 TABLET ORAL at 06:09

## 2022-11-30 RX ADMIN — LORAZEPAM 0.5 MG: 2 INJECTION INTRAMUSCULAR at 15:53

## 2022-11-30 RX ADMIN — ISOSORBIDE MONONITRATE 30 MG: 30 TABLET, EXTENDED RELEASE ORAL at 09:56

## 2022-11-30 RX ADMIN — DULOXETINE HYDROCHLORIDE 30 MG: 30 CAPSULE, DELAYED RELEASE ORAL at 09:56

## 2022-11-30 RX ADMIN — DEXTROSE AND SODIUM CHLORIDE: 5; 900 INJECTION, SOLUTION INTRAVENOUS at 01:10

## 2022-11-30 RX ADMIN — GUANFACINE 1 MG: 1 TABLET ORAL at 17:10

## 2022-11-30 RX ADMIN — PIPERACILLIN AND TAZOBACTAM 3375 MG: 3; .375 INJECTION, POWDER, LYOPHILIZED, FOR SOLUTION INTRAVENOUS at 10:00

## 2022-11-30 RX ADMIN — LORAZEPAM 0.5 MG: 2 INJECTION INTRAMUSCULAR at 02:46

## 2022-11-30 RX ADMIN — TRAMADOL HYDROCHLORIDE 50 MG: 50 TABLET, COATED ORAL at 06:09

## 2022-11-30 RX ADMIN — DOXAZOSIN 1 MG: 2 TABLET ORAL at 20:09

## 2022-11-30 RX ADMIN — RIVAROXABAN 10 MG: 10 TABLET, FILM COATED ORAL at 17:10

## 2022-11-30 RX ADMIN — NYSTATIN: 100000 CREAM TOPICAL at 09:55

## 2022-11-30 RX ADMIN — PIPERACILLIN AND TAZOBACTAM 3375 MG: 3; .375 INJECTION, POWDER, LYOPHILIZED, FOR SOLUTION INTRAVENOUS at 17:09

## 2022-11-30 RX ADMIN — TRAMADOL HYDROCHLORIDE 50 MG: 50 TABLET, COATED ORAL at 01:07

## 2022-11-30 RX ADMIN — NYSTATIN: 100000 CREAM TOPICAL at 20:10

## 2022-11-30 ASSESSMENT — PAIN SCALES - PAIN ASSESSMENT IN ADVANCED DEMENTIA (PAINAD)
NEGVOCALIZATION: 1
CONSOLABILITY: 1
BODYLANGUAGE: 0
BREATHING: 0
BREATHING: 0
NEGVOCALIZATION: 0
CONSOLABILITY: 0
BODYLANGUAGE: 0
FACIALEXPRESSION: 0
TOTALSCORE: 0
FACIALEXPRESSION: 1
TOTALSCORE: 3

## 2022-11-30 ASSESSMENT — PAIN DESCRIPTION - LOCATION: LOCATION: BACK

## 2022-11-30 NOTE — PLAN OF CARE
Problem: Skin/Tissue Integrity  Goal: Absence of new skin breakdown  Description: 1. Monitor for areas of redness and/or skin breakdown  2. Assess vascular access sites hourly  3. Every 4-6 hours minimum:  Change oxygen saturation probe site  4. Every 4-6 hours:  If on nasal continuous positive airway pressure, respiratory therapy assess nares and determine need for appliance change or resting period.   11/30/2022 1031 by Vee Kaplan RN  Outcome: Progressing  11/29/2022 2057 by Jhonatan Andujar RN  Outcome: Progressing     Problem: Safety - Adult  Goal: Free from fall injury  11/30/2022 1031 by Vee Kaplan RN  Outcome: Progressing  11/29/2022 2057 by Jhonatan Andujar RN  Outcome: Progressing     Problem: ABCDS Injury Assessment  Goal: Absence of physical injury  11/30/2022 1031 by Vee Kaplan RN  Outcome: Progressing  11/29/2022 2057 by Jhonatan Andujar RN  Outcome: Progressing     Problem: Pain  Goal: Verbalizes/displays adequate comfort level or baseline comfort level  11/30/2022 1031 by Vee Kaplan RN  Outcome: Progressing  11/29/2022 2057 by Jhonatan Andujar RN  Outcome: Progressing     Problem: Chronic Conditions and Co-morbidities  Goal: Patient's chronic conditions and co-morbidity symptoms are monitored and maintained or improved  11/30/2022 1031 by Vee Kaplan RN  Outcome: Progressing  11/29/2022 2057 by Jhonatan Andujar RN  Outcome: Progressing

## 2022-11-30 NOTE — CONSULTS
Consult Call Back    Who:Dr. Joanna Nur  Date:11/30/2022,  Time:7:24 AM    Electronically signed by Philip Aragon on 11/30/22 at 7:24 AM EST

## 2022-11-30 NOTE — PROGRESS NOTES
Hospitalist Progress Note      PCP: Susanne Polanco MD    Date of Admission: 11/26/2022    Chief Complaint:   Confusion    Hospital Course: This is a 60-year-old female with history of dementia. Patient unable to give accurate history. Per ED report patient with constipation for several days. Patient with increasing abdominal pain. Patient was mentating at baseline upon admission. Patient was also being treated for UTI. Patient has had decreased appetite with nausea. No emesis reported. Patient's mental status was improved this morning but worsened later in the morning. Patient was disorientated and paranoid. Subjective:   More anxious and agitated today. Ativan given which has helped some. Discussed at length with daughter.      Medications:  Reviewed    Infusion Medications    dextrose 5 % and 0.9 % NaCl Stopped (11/29/22 5516)    sodium chloride       Scheduled Medications    piperacillin-tazobactam  3,375 mg IntraVENous Q8H    potassium chloride  40 mEq Oral Once    labetalol  50 mg Oral BID    hydrALAZINE  50 mg Oral TID    allopurinol  100 mg Oral Daily    aspirin  81 mg Oral Daily    atorvastatin  40 mg Oral Nightly    bisacodyl  5 mg Oral Nightly    citalopram  10 mg Oral Daily    doxazosin  1 mg Oral Nightly    DULoxetine  30 mg Oral Daily    [Held by provider] furosemide  40 mg Oral Daily    guanFACINE  1 mg Oral QPM    isosorbide mononitrate  30 mg Oral Daily    nystatin   Topical BID    rivaroxaban  10 mg Oral Dinner    sodium chloride flush  5-40 mL IntraVENous 2 times per day     PRN Meds: LORazepam, traMADol, sodium chloride flush, sodium chloride, ondansetron **OR** ondansetron, acetaminophen **OR** acetaminophen, dicyclomine      Intake/Output Summary (Last 24 hours) at 11/29/2022 1931  Last data filed at 11/29/2022 1550  Gross per 24 hour   Intake 1007.81 ml   Output 400 ml   Net 607.81 ml         Physical Exam Performed:    BP (!) 141/74   Pulse (!) 121   Temp 97.4 °F (36.3 °C) (Oral)   Resp 20   Ht 5' 5\" (1.651 m) Comment: pt stated  Wt 272 lb 11.3 oz (123.7 kg)   SpO2 98%   BMI 45.38 kg/m²     General appearance: No apparent distress, appears stated age and cooperative. Patient lying in bed. HEENT: Pupils equal, round, and reactive to light. Conjunctivae/corneas clear. Neck: Supple, with full range of motion. No jugular venous distention. Trachea midline. Respiratory:  Normal respiratory effort. Clear to auscultation, bilaterally without Rales/Wheezes/Rhonchi. Cardiovascular: Regular rate and rhythm with normal S1/S2 without murmurs, rubs or gallops. Abdomen: Soft, non-tender, non-distended with normal bowel sounds. Musculoskeletal: No clubbing, cyanosis or edema bilaterally. Full range of motion without deformity. Skin: Skin color, texture, turgor normal.  No rashes or lesions. Neurologic:  Neurovascularly intact without any focal sensory/motor deficits. Cranial nerves: II-XII intact, grossly non-focal.  Psychiatric: Drowsy, not well oriented, limited insight  Capillary Refill: Brisk, 3 seconds, normal   Peripheral Pulses: +2 palpable, equal bilaterally       Labs:   Recent Labs     11/27/22  1433   WBC 10.8   HGB 12.7   HCT 38.6          Recent Labs     11/27/22  1433 11/28/22  0826 11/29/22  0558    141 139   K 4.3 3.7 3.3*    108 108   CO2 23 24 22   BUN 40* 38* 28*   CREATININE 3.4* 3.1* 2.0*   CALCIUM 8.6 7.7* 7.9*       No results for input(s): AST, ALT, BILIDIR, BILITOT, ALKPHOS in the last 72 hours. No results for input(s): INR in the last 72 hours.   Recent Labs     11/27/22  1433 11/27/22  1750   TROPONINI 0.07* 0.06*         Urinalysis:      Lab Results   Component Value Date/Time    NITRU Negative 11/26/2022 08:34 PM    WBCUA >100 11/26/2022 08:34 PM    BACTERIA 2+ 11/26/2022 08:34 PM    RBCUA 3-4 11/26/2022 08:34 PM    BLOODU TRACE-INTACT 11/26/2022 08:34 PM    SPECGRAV 1.020 11/26/2022 08:34 PM    GLUCOSEU Negative 11/26/2022 08:34 PM       Radiology:  CT ABDOMEN PELVIS WO CONTRAST Additional Contrast? None   Final Result   1. Constipation with large amount of stool within the sigmoid colon and   rectum. Otherwise, no acute intra-abdominal or pelvic abnormality with   limitations for a noncontrast CT scan. 2. Diverticulosis without obvious inflammation. IP CONSULT TO NEPHROLOGY  IP CONSULT TO INFECTIOUS DISEASES    Assessment/Plan:    Active Hospital Problems    Diagnosis     Acute metabolic encephalopathy [P42.17]      Priority: Medium    Constipation [K59.00]      Priority: Medium    Elevated serum creatinine [R79.89]      Priority: Medium     Acute Metabolic encephalopathy with underlying dementia. Likely 2/2 UTI. Continue Abx. Uses PRN Valium at [facility]. Will continue PRN Ativan for now. UTI: Abnormal UA. UCx showed Enterococcus and Pseudomonas. Abx sensitivity pattern is complex and may require IV agent(s). Significant pyuria on UA. Will ask for Abx guidance from ID. Chronic anticoagulation. Hypotension. Ongoing low/normal BP. She is on multiple BP meds at baseline and these have been held. BP now improving. May need further adjustment to diuretic/BP regimen. Monitor. EDOUARD on CKD. Baseline creatinine around 1.5-1.8. IVF hydration. Nephrology following. Improved and near baseline. Monitor. DVT Prophylaxis: Xarelto  Diet: ADULT DIET; Regular; Low Fat/Low Chol/High Fiber/2 gm Na; 1500 ml  Code Status: Full Code  PT/OT Eval Status: Pending    Dispo -patient will need SNF placement on discharge; likely return to the [facility]. 2-3 days. May require IV Abx pending ID input.      Appropriate for A1 Discharge Unit: Khushi Alvarado MD

## 2022-11-30 NOTE — PROGRESS NOTES
[email or web address] ([employer])  Nephrology Progress Note           CC: EDOUARD on CKD    HPI as of 11/27  80 y.o. yo female with PMH of chronic kidney disease stage III, hypertension, CHF with preserved EF, dementia, chronic lymphedema who is admitted for abdominal pain palpitation and fall  Patient was brought from nursing care facility to emergency room due to abdominal pain and not having bowel movement for 3 days. Patient also had palpitations at the nursing home.   She was recently started on ciprofloxacin for presumed UTI  Her creatinine at nursing care facility was 1.9 middle of November  Patient has dementia and is not able to give meaningful history    SUBJECTIVE  Confused at baseline  No CP or SOB    SOC: no visitors      Scheduled Meds:   piperacillin-tazobactam  3,375 mg IntraVENous Q8H    labetalol  50 mg Oral BID    hydrALAZINE  50 mg Oral TID    allopurinol  100 mg Oral Daily    aspirin  81 mg Oral Daily    atorvastatin  40 mg Oral Nightly    bisacodyl  5 mg Oral Nightly    citalopram  10 mg Oral Daily    doxazosin  1 mg Oral Nightly    DULoxetine  30 mg Oral Daily    [Held by provider] furosemide  40 mg Oral Daily    guanFACINE  1 mg Oral QPM    isosorbide mononitrate  30 mg Oral Daily    nystatin   Topical BID    rivaroxaban  10 mg Oral Dinner    sodium chloride flush  5-40 mL IntraVENous 2 times per day     Continuous Infusions:   dextrose 5 % and 0.9 % NaCl Stopped (11/29/22 0635)    sodium chloride       PRN Meds:LORazepam, traMADol, sodium chloride flush, sodium chloride, ondansetron **OR** ondansetron, acetaminophen **OR** acetaminophen, dicyclomine      Objective:      Physical Exam  Wt Readings from Last 3 Encounters:   11/26/22 272 lb 11.3 oz (123.7 kg)   07/27/21 281 lb 4.8 oz (127.6 kg)   12/11/19 (!) 315 lb 0.6 oz (142.9 kg)     Temp Readings from Last 3 Encounters:   11/29/22 97.4 °F (36.3 °C) (Oral)   07/27/21 97.9 °F (36.6 °C) (Skin)   06/15/21 98.8 °F (37.1 °C) (Infrared)     BP Readings from Last 3 Encounters:   11/29/22 (!) 141/74   07/27/21 133/73   09/10/19 137/67     Pulse Readings from Last 3 Encounters:   11/29/22 (!) 121   09/10/19 70   06/11/19 68    Gen: alert, awake  Neck: No JVD  Skin: Unremarkable  Cardiovascular:  S1, S2 without m/r/g   Respiratory: CTA B without w/r/r; respiratory effort normal  Abdomen:  soft, nt, nd,   Extremities: Chronic lymphedema of lower extremities  Neuro/Psy: AAoriented times 1 ; moves all 4 ext        Lab Review   Lab Results   Component Value Date    WBC 10.8 11/27/2022    HGB 12.7 11/27/2022    HCT 38.6 11/27/2022    MCV 96.6 11/27/2022     11/27/2022     Lab Results   Component Value Date/Time     11/29/2022 05:58 AM    K 3.3 11/29/2022 05:58 AM     11/29/2022 05:58 AM    CO2 22 11/29/2022 05:58 AM    BUN 28 11/29/2022 05:58 AM    CREATININE 2.0 11/29/2022 05:58 AM    GLUCOSE 105 11/29/2022 05:58 AM    CALCIUM 7.9 11/29/2022 05:58 AM            Patient Active Problem List    Diagnosis Date Noted    Acute metabolic encephalopathy 09/57/7400    Constipation 11/26/2022    Elevated serum creatinine 11/26/2022    Visit for wound check 03/05/2019    Basal cell carcinoma of left eyelid 02/19/2019    SOB (shortness of breath)     Chronic diastolic congestive heart failure (HCC)     Rhabdomyolysis 02/20/2018    Cellulitis 12/01/2017    EDOUARD (acute kidney injury) ([facility] [address] 12/01/2017    CKD (chronic kidney disease), stage III ([facility] [address] 12/01/2017    Essential hypertension 12/01/2017    CHF (congestive heart failure) ([facility] [address] 12/01/2017    Leukocytosis 12/01/2017    Primary osteoarthritis of right knee 10/01/2015    Knee pain, right 10/01/2015    Intractable abdominal pain     Acute pancreatitis        ASSESSMENT AND PLAN     #EDOUARD on CKD in the setting of possible UTI and decreased p.o. intake and relative hypotension.   Patient was on torsemide, metolazone and spironolactone as well-all held now  -creatinine trending down    #Chronic kidney disease stage IIIb with baseline creatinine of 1.8-1.9, follows with Dr. Toyin Avelar in the office      #Chronic diastolic CHF   -She also has chronic lymphedema   #Dementia  #UTI  #HTN- BP was low, holding parameters on BP meds  -BP better today  # Hypokalemia  -supplement today as ordered, monitor closely with daily labs  -Mag level okay

## 2022-11-30 NOTE — PROGRESS NOTES
Shift assessment completed. Pt disoriented x4. VSS. AM medications admin whole per MAR. Pt refusing to eat breakfast; pt w/some water intake; pt assist to feed. Pt assisted w/turning and personal care. Colindres in place for fluid management per Nephro; draining yellow/clear output. Denies any needs at this time. Bed locked and in lowest position. Call light within reach. Will continue to monitor.  Electronically signed by Roxanne Moody RN on 11/30/2022 at 10:35 AM

## 2022-11-30 NOTE — CARE COORDINATION
Chart reviewed. Care managed per ID, nephrology and IM. Plan to return skilled to [facility], patient was AL there. Therapy notes completed yesterday. Per ID notes, when showing signs of improvement, will transition to oral atbx for 7 days.  Jamie Coreas RN

## 2022-11-30 NOTE — PROGRESS NOTES
Physical Therapy  Facility/Department: [facility] C3 TELE/MED SURG/ONC  Daily Treatment Note  NAME: Malinda Hobbs  : 3/16/1930  MRN: [id number]    Date of Service: 2022    Discharge Recommendations:  Subacute/Skilled Nursing Facility   PT Equipment Recommendations  Equipment Needed: No    Patient Diagnosis(es): The primary encounter diagnosis was Constipation, unspecified constipation type. A diagnosis of EDOURAD (acute kidney injury) ([facility] [address] was also pertinent to this visit. Assessment   Assessment: Pt seen with OT as co-tx secondary to complexity of condition, decreased cognition, balance and safety. Pt benefits from x 2 skilled hands to provide increased cues and feedback with mobility and improve safety and I. Pt is able to tolerance increased mobility this session but continues to be significantly limited by cognition. Pt requires maxA x 2 for bed mobility. Pt tolerates sitting EOB ~ 8 minutes with grossly CGA to Gladis for balance. Attempted to have pt participate in dynamic reaching or ADL tasks but d/t cognition pt is unable to participate or follow commands. Pt will benefit from continued skilled PT in acute care setting to address above deficits. Activity Tolerance: Patient limited by fatigue;Treatment limited secondary to decreased cognition;Patient limited by endurance  Equipment Needed: No     Plan    Physcial Therapy Plan  General Plan: 3-5 times per week  Specific Instructions for Next Treatment: progress mobility as tolerated  Current Treatment Recommendations: Strengthening;Balance training;Functional mobility training;Transfer training; Endurance training;Gait training;Pain management;Home exercise program;Safety education & training;Patient/Caregiver education & training; Therapeutic activities; Equipment evaluation, education, & procurement; Neuromuscular re-education;Positioning     Restrictions  Restrictions/Precautions  Restrictions/Precautions: Fall Risk, Bed Alarm  Position Activity Restriction  Other position/activity restrictions: thao, IV,     Subjective    Subjective  Subjective: Pt supine in bed on approach, confused but agreeable to PT tx  Pain: Pt denies c/o pain  Orientation  Overall Orientation Status: Impaired  Orientation Level: Oriented to person;Disoriented to time;Disoriented to situation;Disoriented to place     Objective   Vitals   /87  HR 95  SpO2 94% on RA    Bed Mobility Training  Bed Mobility Training: Yes  Interventions: Verbal cues; Safety awareness training  Rolling: Maximum assistance;Assist X2 (maxA x 2 with BR, cues for sequence and safety, increased time to complete)  Supine to Sit: Maximum assistance;Assist X2 (HOB elevated, use of BR, increased time to complete, cues for sequence/technique. Requires assist at trunk and LE)  Sit to Supine: Assist X2;Maximum assistance (HOB elevated, use of BR, increased time to complete, cues for sequence/technique. Requires assist at trunk and LE)  Scooting: Assist X2;Total assistance (TD x 2 to scoot to [facility])    Balance  Sitting: Impaired  Sitting - Static: Poor (constant support)  Sitting - Dynamic: Poor (constant support)  Pt sitting EOB x 8 minutes with grossly Gladis to CGA for balance. Pt demonstrates increased posterior lean, thoracic flexion and downward gaze. Attempted to engage pt in therapeutic reaching activities or ADL tasks however d/t cognition pt with limited participation, reporting \"no I need to get home\" frequently despite attempts at re-orientation. Pt without overt LOB. Responds well to tactile cues to improve midline positioning and promote lumbar and thoracic extension. Transfer Training  Transfer Training: No (unsafe to attempt)  Gait Training  Gait Training: No (unsafe to attempt)     PT Exercises  Exercise Treatment: Attempted participation in TE, completes x 10 reps AAROM LAQ with maximal assist and encouragement     Safety Devices  Type of Devices: Patient at risk for falls; All fall risk precautions in place; Left in bed;Bed alarm in place;Call light within reach;Nurse notified     [facility] Clicks Inpatient Mobility:  AM-PAC Mobility Inpatient   How much difficulty turning over in bed?: A Lot  How much difficulty sitting down on / standing up from a chair with arms?: Unable  How much difficulty moving from lying on back to sitting on side of bed?: Unable  How much help from another person moving to and from a bed to a chair?: Total  How much help from another person needed to walk in hospital room?: Total  How much help from another person for climbing 3-5 steps with a railing?: Total  AM-PAC Inpatient Mobility Raw Score : 7  AM-PAC Inpatient T-Scale Score : 26.42  Mobility Inpatient CMS 0-100% Score: 92.36  Mobility Inpatient CMS G-Code Modifier : CM    Goals  Short Term Goals  Time Frame for Short Term Goals: 7 days (12/06/22) unless otherwise noted  Short Term Goal 1: Pt will perform bed mobility with mod(A)x2 -- 11/30: maxA x 2  Short Term Goal 2: Pt will tolerate sitting EOB x5 minutes with min(A) -- 11/30: GOAL MET x 8 minutes CGA to Gladis  Short Term Goal 3: Pt will tolerate transfer assessment -- 11/30: unsafe to attempt  Short Term Goal 4: Pt will perform 10 reps of BLE exercise with AAROM-AROM by 12/3/22 -- 11/30: able to complete x 10 AAROM LAQ, limited by cognition  Patient Goals   Patient Goals : none stated by pt due to cog    Education  Patient Education  Education Given To: Patient  Education Provided: Role of Therapy;Plan of Care;Transfer Training;Family Education;Orientation;Precautions  Education Provided Comments: educated on role of PT, safety with mobility and re-oriented to situation  Education Method: Verbal  Barriers to Learning: Cognition  Education Outcome: Continued education needed; Unable to demonstrate understanding    Therapy Time   Individual Concurrent Group Co-treatment   Time In 1020         Time Out 1050         Minutes 30         Timed Code Treatment Minutes: 30 Minutes     If pt is unable to be seen after this session, please let this note serve as discharge summary. Please see case management note for discharge disposition. Thank you.       John Smart, PT, DPT

## 2022-11-30 NOTE — PROGRESS NOTES
[email or web address]  Nephrology Progress Note           CC: EDOUARD on CKD    HPI as of 11/27  80 y.o. yo female with PMH of chronic kidney disease stage III, hypertension, CHF with preserved EF, dementia, chronic lymphedema who is admitted for abdominal pain palpitation and fall  Patient was brought from nursing care facility to emergency room due to abdominal pain and not having bowel movement for 3 days. Patient also had palpitations at the nursing home.         SUBJECTIVE  Confused at baseline  No CP or SOB  Unable to void complaint   Kidney function is better    ROS:  - no fevers  - poor urine volume     SOC: no visitors      Scheduled Meds:   piperacillin-tazobactam  3,375 mg IntraVENous Q8H    labetalol  50 mg Oral BID    hydrALAZINE  50 mg Oral TID    allopurinol  100 mg Oral Daily    aspirin  81 mg Oral Daily    atorvastatin  40 mg Oral Nightly    bisacodyl  5 mg Oral Nightly    citalopram  10 mg Oral Daily    doxazosin  1 mg Oral Nightly    DULoxetine  30 mg Oral Daily    [Held by provider] furosemide  40 mg Oral Daily    guanFACINE  1 mg Oral QPM    isosorbide mononitrate  30 mg Oral Daily    nystatin   Topical BID    rivaroxaban  10 mg Oral Dinner    sodium chloride flush  5-40 mL IntraVENous 2 times per day     Continuous Infusions:   electrolyte-A      sodium chloride       PRN Meds:LORazepam, traMADol, sodium chloride flush, sodium chloride, ondansetron **OR** ondansetron, acetaminophen **OR** acetaminophen, dicyclomine      Objective:      Physical Exam  Wt Readings from Last 3 Encounters:   11/26/22 272 lb 11.3 oz (123.7 kg)   07/27/21 281 lb 4.8 oz (127.6 kg)   12/11/19 (!) 315 lb 0.6 oz (142.9 kg)     Temp Readings from Last 3 Encounters:   11/30/22 97.5 °F (36.4 °C) (Oral)   07/27/21 97.9 °F (36.6 °C) (Skin)   06/15/21 98.8 °F (37.1 °C) (Infrared)     BP Readings from Last 3 Encounters:   11/30/22 116/74   07/27/21 133/73   09/10/19 137/67     Pulse Readings from Last 3 Encounters:   11/30/22 (!) 109 09/10/19 70   06/11/19 68    Gen: alert, awake  Neck: No JVD  Skin: Unremarkable  Cardiovascular:  S1, S2 without m/r/g   Respiratory: CTA B without w/r/r; respiratory effort normal  Abdomen:  soft, nt, nd,   Extremities: Chronic lymphedema of lower extremities  Neuro/Psy: AAoriented times 1 ; moves all 4 ext        Lab Review   Lab Results   Component Value Date    WBC 10.8 11/27/2022    HGB 12.7 11/27/2022    HCT 38.6 11/27/2022    MCV 96.6 11/27/2022     11/27/2022     Lab Results   Component Value Date/Time     11/30/2022 06:26 AM    K 3.6 11/30/2022 06:26 AM     11/30/2022 06:26 AM    CO2 22 11/30/2022 06:26 AM    BUN 23 11/30/2022 06:26 AM    CREATININE 1.7 11/30/2022 06:26 AM    GLUCOSE 97 11/30/2022 06:26 AM    CALCIUM 8.3 11/30/2022 06:26 AM            Patient Active Problem List    Diagnosis Date Noted    Acute metabolic encephalopathy 50/57/0034    Complicated UTI (urinary tract infection) 11/29/2022    Dementia with agitation 11/29/2022    Constipation 11/26/2022    Elevated serum creatinine 11/26/2022    Visit for wound check 03/05/2019    Basal cell carcinoma of left eyelid 02/19/2019    SOB (shortness of breath)     Chronic diastolic congestive heart failure (HCC)     Rhabdomyolysis 02/20/2018    Cellulitis 12/01/2017    EDOUARD (acute kidney injury) ([facility] [address] 12/01/2017    CKD (chronic kidney disease), stage III ([facility] [address] 12/01/2017    Essential hypertension 12/01/2017    CHF (congestive heart failure) ([facility] [address] 12/01/2017    Leukocytosis 12/01/2017    Primary osteoarthritis of right knee 10/01/2015    Knee pain, right 10/01/2015    Intractable abdominal pain     Acute pancreatitis        ASSESSMENT AND PLAN     #EDOUARD on CKD in the setting of possible UTI and decreased p.o. intake and relative hypotension.   Patient was on torsemide, metolazone and spironolactone as well-all held now  -creatinine trending down with IV fluids, urine output remains poor but back to baseline     #Chronic kidney disease stage IIIb with baseline creatinine of 1.8 to 2.4       #Chronic diastolic CHF   -She also has chronic lymphedema   #Dementia   Superimposed encephalopathy vs. Delirium    Much more confused than baseline, does not even recognize me     #UTI   ID following, on zosyn   #HTN- BP was low, holding parameters on BP meds  -BP better today  # Hypokalemia  -supplement today as ordered, monitor closely with daily labs  -Mag level okay

## 2022-11-30 NOTE — PROGRESS NOTES
Comprehensive Nutrition Assessment    Type and Reason for Visit:  Initial, Positive Nutrition Screen, Wound    Nutrition Recommendations/Plan:   Liberalize diet to no added salt   Add Boost Pudding and Magic Cups BID   Encourage PO intakes as tolerated   Assist as needed with meals   Monitor nutrition adequacy, pertinent labs, bowel habits, wt changes, and clinical progress     Malnutrition Assessment:  Malnutrition Status: At risk for malnutrition (Comment) (11/30/22 1343)    Context:  Acute Illness     Findings of the 6 clinical characteristics of malnutrition:  Energy Intake:  Mild decrease in energy intake (Comment)  Weight Loss:  Unable to assess       Nutrition Assessment:    81 yo female admitted with constipation. Hx of CKD, CHF, and dementia. Pt nutritionally compromised AEB wounds and poor PO intakes this admission. Pt confused at time of visit. Recommend liberalizing diet to allow more meal options this admission. RD to add ONS to promote PO intakes. Pt requires assistance with meals. Noted refused breakfast this AM. Will continue to monitor. Nutrition Related Findings:    +1 BLE edema. Active BS. BM on 11/29. Labs reviewed. Wound Type: Pressure Injury, Stage II, Skin Tears       Current Nutrition Intake & Therapies:    Average Meal Intake: 0%, 1-25%, 26-50%  Average Supplements Intake: None Ordered  ADULT DIET; Regular; Low Fat/Low Chol/High Fiber/2 gm Na; 1500 ml    Anthropometric Measures:  Height: 5' 5\" (165.1 cm)  Ideal Body Weight (IBW): 125 lbs (57 kg)       Current Body Weight: 272 lb (123.4 kg), 217.6 % IBW.  Weight Source: Bed Scale  Current BMI (kg/m2): 45.3  Usual Body Weight:  (no weight hx per EMR)                       BMI Categories: Obese Class 3 (BMI 40.0 or greater)    Estimated Daily Nutrient Needs:  Energy Requirements Based On: Kcal/kg (25-30)  Weight Used for Energy Requirements: Ideal  Energy (kcal/day): 4928-0946 kcal  Weight Used for Protein Requirements: Ideal (1.0-1.2 g/kg)  Protein (g/day): 57-69 g     Fluid (ml/day): 1500 mL FR    Nutrition Diagnosis:   Inadequate oral intake related to inadequate protein-energy intake, increase demand for energy/nutrients as evidenced by wounds, intake 0-25%, intake 26-50%    Nutrition Interventions:   Food and/or Nutrient Delivery: Modify Current Diet, Start Oral Nutrition Supplement  Nutrition Education/Counseling: No recommendation at this time  Coordination of Nutrition Care: Continue to monitor while inpatient       Goals:     Goals: PO intake 50% or greater, prior to discharge       Nutrition Monitoring and Evaluation:   Behavioral-Environmental Outcomes: None Identified  Food/Nutrient Intake Outcomes: Food and Nutrient Intake, Supplement Intake  Physical Signs/Symptoms Outcomes: Biochemical Data, Meal Time Behavior, Nutrition Focused Physical Findings, Weight    Discharge Planning:     Too soon to determine     Ashok Elkins [address], RD, LD  Contact: [phone]

## 2022-11-30 NOTE — PROGRESS NOTES
Pts thao output 150ml since 0641. MD paged via [facility]; awaiting response.  Electronically signed by Rik Doloey RN on 11/30/2022 at 2:50 PM

## 2022-11-30 NOTE — PROGRESS NOTES
.4 Eyes Skin Assessment     The patient is being assess for  Shift Handoff    I agree that 2 RN's have performed a thorough Head to Toe Skin Assessment on the patient. ALL assessment sites listed below have been assessed. Areas assessed by both nurses: primo and Kia  [x]   Head, Face, and Ears   [x]   Shoulders, Back, and Chest  [x]   Arms, Elbows, and Hands   [x]   Coccyx, Sacrum, and A  [x]   Legs, Feet, and Heels    STAGE   2 on buttock  Does the Patient have Skin Breakdown?   Yes LDA WOUND CARE was Initiated documentation include the Fany-wound, Wound Assessment, Measurements, Dressing Treatment, Drainage, and Color\",         Zach Prevention initiated:  Yes   Wound Care Orders initiated:  Yes      [id number] nurse consulted for Pressure Injury (Stage 3,4, Unstageable, DTI, NWPT, and Complex wounds), New and Established Ostomies:  No      Nurse 1 eSignature: Electronically signed by Lou Madrigal RN on 11/29/22 at 8:17 PM EST    **SHARE this note so that the co-signing nurse is able to place an eSignature**    Nurse 2 eSignature: Electronically signed by Kaylan Carmen RN on 11/29/22 at 8:33 PM EST

## 2022-11-30 NOTE — PROGRESS NOTES
Infectious Disease Follow up Notes    CC :  complicated UTI; dementia with encephalopathy      Antibiotics:   Pip-tazo 3.375 q8    Admit Date:   11/26/2022  Hospital Day: 5    Subjective:   She remains AF   Poor UOP via thao  Poor po intake.   Able to swallow pills without difficulty     Objective:     Patient Vitals for the past 8 hrs:   BP Temp Temp src Pulse Resp SpO2 Height   11/30/22 1439 119/78 97.5 °F (36.4 °C) Oral (!) 109 18 92 % --   11/30/22 1336 -- -- -- -- -- -- 5' 5\" (1.651 m)   11/30/22 0953 (!) 153/87 97.3 °F (36.3 °C) Oral 95 16 -- --       EXAM:  Resting comfortably; recently heard calling out from her room  Morbidly obese    No acute skin changes  Abd soft, NT.  BS present       LINE:     PIV   Thao       Scheduled Meds:   piperacillin-tazobactam  3,375 mg IntraVENous Q8H    labetalol  50 mg Oral BID    hydrALAZINE  50 mg Oral TID    allopurinol  100 mg Oral Daily    aspirin  81 mg Oral Daily    atorvastatin  40 mg Oral Nightly    bisacodyl  5 mg Oral Nightly    citalopram  10 mg Oral Daily    doxazosin  1 mg Oral Nightly    DULoxetine  30 mg Oral Daily    [Held by provider] furosemide  40 mg Oral Daily    guanFACINE  1 mg Oral QPM    isosorbide mononitrate  30 mg Oral Daily    nystatin   Topical BID    rivaroxaban  10 mg Oral Dinner    sodium chloride flush  5-40 mL IntraVENous 2 times per day       Continuous Infusions:   sodium chloride            Data Review:    Lab Results   Component Value Date    WBC 10.8 11/27/2022    HGB 12.7 11/27/2022    HCT 38.6 11/27/2022    MCV 96.6 11/27/2022     11/27/2022     Lab Results   Component Value Date    CREATININE 1.7 (H) 11/30/2022    BUN 23 (H) 11/30/2022     11/30/2022    K 3.6 11/30/2022     11/30/2022    CO2 22 11/30/2022       Hepatic Function Panel:   Lab Results   Component Value Date/Time    ALKPHOS 166 11/26/2022 04:04 PM    ALT 20 11/26/2022 04:04 PM    AST 30 11/26/2022 04:04 PM    PROT 7.3 11/26/2022 04:04 PM    PROT 5.6 02/16/2013 10:33 AM    BILITOT 0.8 11/26/2022 04:04 PM    LABALBU 3.6 11/26/2022 04:04 PM       Cultures:   11/26   UC E faecalis and Psa   Enterococcus faecalis  Antibiotic Interpretation Microscan   Method Status     ampicillin Sensitive <=2 mcg/mL BACTERIAL SUSCEPTIBILITY PANEL BY SAGE       nitrofurantoin Sensitive <=16 mcg/mL BACTERIAL SUSCEPTIBILITY PANEL BY SAGE       tetracycline Resistant >=16 mcg/mL BACTERIAL SUSCEPTIBILITY PANEL BY SAGE       vancomycin Sensitive 1 mcg/mL BACTERIAL SUSCEPTIBILITY PANEL BY SAGE          Pseudomonas aeruginosa    Antibiotic Interpretation Microscan   Method Status     cefepime Sensitive 8 mcg/mL BACTERIAL SUSCEPTIBILITY PANEL BY SAGE       ciprofloxacin Sensitive <=1 mcg/mL BACTERIAL SUSCEPTIBILITY PANEL BY SAGE       gentamicin Sensitive <=4 mcg/mL BACTERIAL SUSCEPTIBILITY PANEL BY SAGE       meropenem Sensitive <=1 mcg/mL BACTERIAL SUSCEPTIBILITY PANEL BY SAGE       piperacillin-tazobactam Sensitive <=16 mcg/mL BACTERIAL SUSCEPTIBILITY PANEL BY SAGE       tobramycin Sensitive <=4 mcg/mL BACTERIAL SUSCEPTIBILITY PANEL BY SAGE       levofloxacin Intermediate 2.0 ug/ml BACTERIAL SUSCEPTIBILITY PANEL BY E-TEST              Radiology Review:  All pertinent images / reports were reviewed as a part of this visit. CT ap 11/26/22  Impression   1. Constipation with large amount of stool within the sigmoid colon and   rectum. Otherwise, no acute intra-abdominal or pelvic abnormality with   limitations for a noncontrast CT scan. 2. Diverticulosis without obvious inflammation.       Assessment:     Patient Active Problem List    Diagnosis Date Noted    Acute metabolic encephalopathy 69/43/4933     Priority: Medium    Complicated UTI (urinary tract infection) 11/29/2022     Priority: Medium    Dementia with agitation 11/29/2022     Priority: Medium    Constipation 11/26/2022     Priority: Medium    Elevated serum creatinine 11/26/2022     Priority: Medium    Visit for wound check 03/05/2019    Basal cell carcinoma of left eyelid 02/19/2019    SOB (shortness of breath)     Chronic diastolic congestive heart failure (HCC)     Rhabdomyolysis 02/20/2018    Cellulitis 12/01/2017    EDOUARD (acute kidney [facility]) ([facility] 12/01/2017    CKD (chronic kidney disease), stage III ([facility] 12/01/2017    Essential hypertension 12/01/2017    CHF (congestive heart failure) ([facility] 12/01/2017    Leukocytosis 12/01/2017    Primary osteoarthritis of right knee 10/01/2015    Knee pain, right 10/01/2015    Intractable abdominal pain     Acute pancreatitis        Dementia with acute encephalopathy      Admitted with abd pain, constipation  Was being treated for UTI with po cipro at the time of admission      UTI  Growth of amp-S E feacalis and lower level growth of a sensitive Pseudomonas which may reflect partial treatment prior to admission      No abx allergies   QTc was 531 on 11/27/22    Plan:     Continue Zosyn for now     [address] [place] [address] to change to po abx at or prior to DC       No family in the room  D/w ROHITH Lorenz MD  Phone: [phone]   Fax : [phone]

## 2022-11-30 NOTE — PROGRESS NOTES
Hospitalist Progress Note      PCP: Karon Jaramillo MD    Date of Admission: 11/26/2022    Chief Complaint:   Confusion    Hospital Course: This is a 72-year-old female with history of dementia. Patient unable to give accurate history. Per ED report patient with constipation for several days. Patient with increasing abdominal pain. Patient was mentating at baseline upon admission. Patient was also being treated for UTI. Patient has had decreased appetite with nausea. No emesis reported. Patient's mental status was improved this morning but worsened later in the morning. Patient was disorientated and paranoid. Subjective:   Calm but ongoing confusion.      Medications:  Reviewed    Infusion Medications    dextrose 5 % and 0.9 % NaCl 75 mL/hr at 11/30/22 0110    sodium chloride       Scheduled Medications    piperacillin-tazobactam  3,375 mg IntraVENous Q8H    labetalol  50 mg Oral BID    hydrALAZINE  50 mg Oral TID    allopurinol  100 mg Oral Daily    aspirin  81 mg Oral Daily    atorvastatin  40 mg Oral Nightly    bisacodyl  5 mg Oral Nightly    citalopram  10 mg Oral Daily    doxazosin  1 mg Oral Nightly    DULoxetine  30 mg Oral Daily    [Held by provider] furosemide  40 mg Oral Daily    guanFACINE  1 mg Oral QPM    isosorbide mononitrate  30 mg Oral Daily    nystatin   Topical BID    rivaroxaban  10 mg Oral Dinner    sodium chloride flush  5-40 mL IntraVENous 2 times per day     PRN Meds: LORazepam, traMADol, sodium chloride flush, sodium chloride, ondansetron **OR** ondansetron, acetaminophen **OR** acetaminophen, dicyclomine      Intake/Output Summary (Last 24 hours) at 11/30/2022 1217  Last data filed at 11/30/2022 1027  Gross per 24 hour   Intake 740 ml   Output 525 ml   Net 215 ml         Physical Exam Performed:    BP (!) 153/87   Pulse 95   Temp 97.3 °F (36.3 °C) (Oral)   Resp 16   Ht 5' 5\" (1.651 m) Comment: pt stated  Wt 272 lb 11.3 oz (123.7 kg)   SpO2 94%   BMI 45.38 kg/m² General appearance: No apparent distress, appears stated age and cooperative. Patient lying in bed. HEENT: Pupils equal, round, and reactive to light. Conjunctivae/corneas clear. Neck: Supple, with full range of motion. No jugular venous distention. Trachea midline. Respiratory:  Normal respiratory effort. Clear to auscultation, bilaterally without Rales/Wheezes/Rhonchi. Cardiovascular: Regular rate and rhythm with normal S1/S2 without murmurs, rubs or gallops. Abdomen: Soft, non-tender, non-distended with normal bowel sounds. Musculoskeletal: No clubbing, cyanosis or edema bilaterally. Full range of motion without deformity. Skin: Skin color, texture, turgor normal.  No rashes or lesions. Neurologic:  Neurovascularly intact without any focal sensory/motor deficits. Cranial nerves: II-XII intact, grossly non-focal.  Psychiatric: Alert, partially oriented, limited insight  Capillary Refill: Brisk, 3 seconds, normal   Peripheral Pulses: +2 palpable, equal bilaterally       Labs:   Recent Labs     11/27/22  1433   WBC 10.8   HGB 12.7   HCT 38.6          Recent Labs     11/28/22  0826 11/29/22  0558 11/30/22  0626    139 141   K 3.7 3.3* 3.6    108 108   CO2 24 22 22   BUN 38* 28* 23*   CREATININE 3.1* 2.0* 1.7*   CALCIUM 7.7* 7.9* 8.3       No results for input(s): AST, ALT, BILIDIR, BILITOT, ALKPHOS in the last 72 hours. No results for input(s): INR in the last 72 hours. Recent Labs     11/27/22  1433 11/27/22  1750   TROPONINI 0.07* 0.06*         Urinalysis:      Lab Results   Component Value Date/Time    NITRU Negative 11/26/2022 08:34 PM    WBCUA >100 11/26/2022 08:34 PM    BACTERIA 2+ 11/26/2022 08:34 PM    RBCUA 3-4 11/26/2022 08:34 PM    BLOODU TRACE-INTACT 11/26/2022 08:34 PM    SPECGRAV 1.020 11/26/2022 08:34 PM    GLUCOSEU Negative 11/26/2022 08:34 PM       Radiology:  CT ABDOMEN PELVIS WO CONTRAST Additional Contrast? None   Final Result   1.  Constipation with large amount of stool within the sigmoid colon and   rectum. Otherwise, no acute intra-abdominal or pelvic abnormality with   limitations for a noncontrast CT scan. 2. Diverticulosis without obvious inflammation. IP CONSULT TO NEPHROLOGY  IP CONSULT TO INFECTIOUS DISEASES    Assessment/Plan:    Active Hospital Problems    Diagnosis     Acute metabolic encephalopathy [E57.29]      Priority: Medium    Complicated UTI (urinary tract infection) [N39.0]      Priority: Medium    Dementia with agitation [F03.911]      Priority: Medium    Constipation [K59.00]      Priority: Medium    Elevated serum creatinine [R79.89]      Priority: Medium     Acute Metabolic encephalopathy with underlying dementia. Likely 2/2 UTI. Continue Abx. Uses [facility]N Valium at [facility]. Will continue PRN Ativan for now. UTI: Abnormal UA. UCx showed Enterococcus and Pseudomonas. Abx sensitivity pattern is complex and may require IV agent(s). Significant pyuria on UA. ID consulted. Chronic anticoagulation. Hypotension. Ongoing low/normal BP. She is on multiple BP meds at baseline and these have been held. BP now improving. May need further adjustment to diuretic/BP regimen. Monitor. EDOUARD on CKD. Baseline creatinine around 1.5-1.8. IVF hydration. Nephrology following. Improved and near baseline. Monitor. DVT Prophylaxis: Xarelto  Diet: ADULT DIET; Regular; Low Fat/Low Chol/High Fiber/2 gm Na; 1500 ml  Code Status: Full Code  PT/OT Eval Status: Pending    Dispo -patient will need SNF placement on discharge; likely return to the [facility]. 2-3 days. ID recommended PO Abx.       Appropriate for A1 Discharge Unit: Khushi Mahmood MD

## 2022-11-30 NOTE — PROGRESS NOTES
Pt placed on specialty bed at this time.  Electronically signed by Jarred Solomon RN on 11/30/2022 at 4:06 PM

## 2022-11-30 NOTE — PROGRESS NOTES
Occupational Therapy  Facility/Department: [facility] C3 TELE/MED SURG/ONC  Daily Treatment Note  NAME: Jaz Santo  : 3/16/1930  MRN: [id number]    Date of Service: 2022    Discharge Recommendations:  Subacute/Skilled Nursing Facility  OT Equipment Recommendations  Equipment Needed: No  Other: Defer to facility to obtain    Patient Diagnosis(es): The primary encounter diagnosis was Constipation, unspecified constipation type. A diagnosis of EDOUARD (acute kidney injury) ([facility] was also pertinent to this visit. Assessment    Assessment: Pt seen with PT as co-tx secondary to complexity of condition, decreased cognition, balance and safety. Pt benefits from x 2 skilled hands to provide increased cues and feedback with mobility and improve safety and I. Pt is able to tolerance increased mobility this session but continues to be significantly limited by cognition. Pt requires maxA x 2 for bed mobility. Pt tolerates sitting EOB ~ 8-10 minutes with grossly CGA to Gladis for balance. Attempted to have pt participate in dynamic reaching or ADL tasks but d/t cognition pt is unable to participate or follow commands. Pt declined all ADLs 2/2 cognition (\"I dont  need to do any of that\" Pt required consitent vc throughout session for orinetation. Pt will benefit from continued skilled OT in acute care setting to address above deficits. Activity Tolerance: Patient limited by fatigue;Treatment limited secondary to decreased cognition;Patient limited by endurance  Discharge Recommendations: Subacute/Skilled Nursing Facility  Equipment Needed: No  Other: Defer to facility to obtain      Plan   Occupational Therapy Plan  Times Per Week: 3-5x/wk  Current Treatment Recommendations: Strengthening;Balance training;Functional mobility training; Endurance training; Safety education & training;Patient/Caregiver education & training;Self-Care / ADL; Home management training;Pain management;Cognitive reorientation;Positioning Restrictions  Restrictions/Precautions  Restrictions/Precautions: Fall Risk;Bed Alarm  Position Activity Restriction  Other position/activity restrictions: BRAXTON thao,    Subjective   Subjective  Subjective: pt confused throughout session stating \"i'm going to need to get home\"  Pain: did not verbalize  Orientation  Overall Orientation Status: Impaired  Orientation Level: Oriented to person;Disoriented to time;Disoriented to situation;Disoriented to place  Pain: Pt denies c/o pain  Cognition  Overall Cognitive Status: Exceptions  Arousal/Alertness: Delayed responses to stimuli;Inconsistent responses to stimuli  Following Commands: Follows one step commands with increased time; Follows one step commands with repetition; Inconsistently follows commands  Attention Span: Attends with cues to redirect  Memory: Decreased recall of recent events;Decreased short term memory;Decreased recall of biographical Information;Decreased recall of precautions;Decreased long term memory  Safety Judgement: Decreased awareness of need for assistance;Decreased awareness of need for safety  Problem Solving: Decreased awareness of errors;Assistance required to identify errors made;Assistance required to generate solutions;Assistance required to implement solutions;Assistance required to correct errors made  Insights: Not aware of deficits  Initiation: Requires cues for some  Sequencing: Requires cues for some        Objective    Vitals 153/87 BP, 95 HR     Bed Mobility Training  Bed Mobility Training: Yes  Interventions: Verbal cues; Safety awareness training  Rolling: Maximum assistance;Assist X2  Supine to Sit: Maximum assistance;Assist X2  Sit to Supine: Assist X2;Maximum assistance  Scooting: Assist X2;Total assistance  Balance  Sitting: Impaired  Sitting - Static: Poor (constant support)  Sitting - Dynamic: Poor (constant support)  Transfer Training  Transfer Training: No  Gait Training  Gait Training: No (unsafe to attempt) ADL  Equipment Provided:  (pt declined all ADLs)  OT Exercises  A/AROM Exercises: a/AROM max A sitting EOB x 10 minutes for shoulder flexion to 80 degrees 6x each arm, knee extension 6x each leg with A 2/2 poor motor planning and cognition     Safety Devices  Type of Devices: Patient at risk for falls; All fall risk precautions in place; Left in bed;Bed alarm in place;Call light within reach;Nurse notified; All mariah prominences offloaded (pt with wedge placed under bottom/slight roll to R with heels offloaded)     Patient Education  Education Given To: Patient  Education Provided: Role of Therapy;Plan of Care;Precautions;Orientation  Education Provided Comments: role of OT, importance of exercise/AROM, orientation  Education Method: Demonstration;Verbal  Barriers to Learning: Cognition;Vision  Education Outcome: Unable to verbalize; Unable to demonstrate understanding    Goals  Short Term Goals  Time Frame for Short Term Goals: 1 week (12/6) unless otherwise noted  Short Term Goal 1: Pt will complete sup>sit transfer w/ modAx2  Short Term Goal 2: Pt will complete bed>chair transfer w/ modAx2 and LRAD  Short Term Goal 3: Pt will complete seated grooming task w/ SBA and set-up  Short Term Goal 4: Pt will complete toileting w/ Gladis and LRAD  Patient Goals   Patient goals : pt unable to state     AM-PAC Daily Activity Inpatient   How much help for putting on and taking off regular lower body clothing?: Total  How much help for Bathing?: Total  How much help for Toileting?: Total  How much help for putting on and taking off regular upper body clothing?: A Lot  How much help for taking care of personal grooming?: A Lot  How much help for eating meals?: A Little  AM-[facility] Inpatient Daily Activity Raw Score: 10  AM-PAC Inpatient ADL T-Scale Score : 27.31  ADL Inpatient CMS 0-100% Score: 74.7  ADL Inpatient CMS G-Code Modifier : CL    Therapy Time   Individual Concurrent Group Co-treatment   Time In 1024         Time Out 1050 Minutes 26         Timed Code Treatment Minutes: 24262 Sr 56, OT  If pt is unable to be seen after this session, please let this note serve as discharge summary. Please see case management note for discharge disposition. Thank you.

## 2022-12-01 LAB
ALBUMIN SERPL-MCNC: 2.7 G/DL (ref 3.4–5)
ANION GAP SERPL CALCULATED.3IONS-SCNC: 9 MMOL/L (ref 3–16)
BUN BLDV-MCNC: 21 MG/DL (ref 7–20)
CALCIUM SERPL-MCNC: 8.6 MG/DL (ref 8.3–10.6)
CHLORIDE BLD-SCNC: 109 MMOL/L (ref 99–110)
CO2: 22 MMOL/L (ref 21–32)
CREAT SERPL-MCNC: 1.5 MG/DL (ref 0.6–1.2)
GFR SERPL CREATININE-BSD FRML MDRD: 32 ML/MIN/{1.73_M2}
GLUCOSE BLD-MCNC: 92 MG/DL (ref 70–99)
PHOSPHORUS: 2.8 MG/DL (ref 2.5–4.9)
POTASSIUM REFLEX MAGNESIUM: 3.8 MMOL/L (ref 3.5–5.1)
POTASSIUM SERPL-SCNC: 3.8 MMOL/L (ref 3.5–5.1)
SODIUM BLD-SCNC: 140 MMOL/L (ref 136–145)

## 2022-12-01 PROCEDURE — 80069 RENAL FUNCTION PANEL: CPT

## 2022-12-01 PROCEDURE — 1200000000 HC SEMI PRIVATE

## 2022-12-01 PROCEDURE — 2580000003 HC RX 258: Performed by: INTERNAL MEDICINE

## 2022-12-01 PROCEDURE — 6370000000 HC RX 637 (ALT 250 FOR IP): Performed by: NURSE PRACTITIONER

## 2022-12-01 PROCEDURE — 6360000002 HC RX W HCPCS: Performed by: INTERNAL MEDICINE

## 2022-12-01 PROCEDURE — 99232 SBSQ HOSP IP/OBS MODERATE 35: CPT | Performed by: INTERNAL MEDICINE

## 2022-12-01 PROCEDURE — 2580000003 HC RX 258: Performed by: NURSE PRACTITIONER

## 2022-12-01 PROCEDURE — 6370000000 HC RX 637 (ALT 250 FOR IP): Performed by: INTERNAL MEDICINE

## 2022-12-01 PROCEDURE — 97110 THERAPEUTIC EXERCISES: CPT

## 2022-12-01 PROCEDURE — 97530 THERAPEUTIC ACTIVITIES: CPT

## 2022-12-01 PROCEDURE — 36415 COLL VENOUS BLD VENIPUNCTURE: CPT

## 2022-12-01 PROCEDURE — 97535 SELF CARE MNGMENT TRAINING: CPT

## 2022-12-01 RX ORDER — CIPROFLOXACIN 500 MG/1
500 TABLET, FILM COATED ORAL EVERY 12 HOURS SCHEDULED
Status: COMPLETED | OUTPATIENT
Start: 2022-12-01 | End: 2022-12-05

## 2022-12-01 RX ORDER — AMOXICILLIN 250 MG/1
500 CAPSULE ORAL EVERY 8 HOURS SCHEDULED
Status: COMPLETED | OUTPATIENT
Start: 2022-12-01 | End: 2022-12-05

## 2022-12-01 RX ADMIN — AMOXICILLIN 500 MG: 250 CAPSULE ORAL at 22:39

## 2022-12-01 RX ADMIN — CITALOPRAM HYDROBROMIDE 10 MG: 20 TABLET ORAL at 08:37

## 2022-12-01 RX ADMIN — NYSTATIN: 100000 CREAM TOPICAL at 19:45

## 2022-12-01 RX ADMIN — BISACODYL 5 MG: 5 TABLET, COATED ORAL at 19:43

## 2022-12-01 RX ADMIN — GUANFACINE 1 MG: 1 TABLET ORAL at 17:24

## 2022-12-01 RX ADMIN — PIPERACILLIN AND TAZOBACTAM 3375 MG: 3; .375 INJECTION, POWDER, LYOPHILIZED, FOR SOLUTION INTRAVENOUS at 08:37

## 2022-12-01 RX ADMIN — SODIUM CHLORIDE, SODIUM GLUCONATE, SODIUM ACETATE, POTASSIUM CHLORIDE AND MAGNESIUM CHLORIDE 1000 ML: 526; 502; 368; 37; 30 INJECTION, SOLUTION INTRAVENOUS at 12:20

## 2022-12-01 RX ADMIN — SODIUM CHLORIDE, PRESERVATIVE FREE 10 ML: 5 INJECTION INTRAVENOUS at 19:45

## 2022-12-01 RX ADMIN — PIPERACILLIN AND TAZOBACTAM 3375 MG: 3; .375 INJECTION, POWDER, LYOPHILIZED, FOR SOLUTION INTRAVENOUS at 02:01

## 2022-12-01 RX ADMIN — DULOXETINE HYDROCHLORIDE 30 MG: 30 CAPSULE, DELAYED RELEASE ORAL at 08:37

## 2022-12-01 RX ADMIN — AMOXICILLIN 500 MG: 250 CAPSULE ORAL at 17:24

## 2022-12-01 RX ADMIN — ISOSORBIDE MONONITRATE 30 MG: 30 TABLET, EXTENDED RELEASE ORAL at 08:37

## 2022-12-01 RX ADMIN — ASPIRIN 81 MG 81 MG: 81 TABLET ORAL at 08:37

## 2022-12-01 RX ADMIN — LABETALOL HYDROCHLORIDE 50 MG: 100 TABLET, FILM COATED ORAL at 08:37

## 2022-12-01 RX ADMIN — ACETAMINOPHEN 650 MG: 325 TABLET ORAL at 19:43

## 2022-12-01 RX ADMIN — CIPROFLOXACIN 500 MG: 500 TABLET, FILM COATED ORAL at 22:38

## 2022-12-01 RX ADMIN — ATORVASTATIN CALCIUM 40 MG: 40 TABLET, FILM COATED ORAL at 19:43

## 2022-12-01 RX ADMIN — NYSTATIN: 100000 CREAM TOPICAL at 08:40

## 2022-12-01 RX ADMIN — ALLOPURINOL 100 MG: 100 TABLET ORAL at 08:37

## 2022-12-01 RX ADMIN — RIVAROXABAN 10 MG: 10 TABLET, FILM COATED ORAL at 17:24

## 2022-12-01 RX ADMIN — HYDRALAZINE HYDROCHLORIDE 50 MG: 50 TABLET, FILM COATED ORAL at 19:43

## 2022-12-01 RX ADMIN — HYDRALAZINE HYDROCHLORIDE 50 MG: 50 TABLET, FILM COATED ORAL at 08:37

## 2022-12-01 RX ADMIN — HYDRALAZINE HYDROCHLORIDE 50 MG: 50 TABLET, FILM COATED ORAL at 15:15

## 2022-12-01 RX ADMIN — DOXAZOSIN 1 MG: 2 TABLET ORAL at 19:44

## 2022-12-01 RX ADMIN — LABETALOL HYDROCHLORIDE 50 MG: 100 TABLET, FILM COATED ORAL at 19:43

## 2022-12-01 RX ADMIN — TRAMADOL HYDROCHLORIDE 50 MG: 50 TABLET, COATED ORAL at 22:39

## 2022-12-01 ASSESSMENT — PAIN DESCRIPTION - LOCATION
LOCATION: BACK
LOCATION: BACK

## 2022-12-01 ASSESSMENT — PAIN SCALES - GENERAL
PAINLEVEL_OUTOF10: 6
PAINLEVEL_OUTOF10: 2

## 2022-12-01 NOTE — PROGRESS NOTES
[facility]  Nephrology Progress Note           CC: EDOUARD on CKD    HPI as of 11/27  80 y.o. yo female with PMH of chronic kidney disease stage III, hypertension, CHF with preserved EF, dementia, chronic lymphedema who is admitted for abdominal pain palpitation and fall  Patient was brought from nursing care facility to emergency room due to abdominal pain and not having bowel movement for 3 days. Patient also had palpitations at the nursing home.         SUBJECTIVE    She is doing better  Scr is 1.5  Off IV fluids  More alert and remembers me from office     ROS:  - no fevers  - good urine volume     SOC: no visitors      Scheduled Meds:   amoxicillin  500 mg Oral 3 times per day    ciprofloxacin  500 mg Oral 2 times per day    labetalol  50 mg Oral BID    hydrALAZINE  50 mg Oral TID    allopurinol  100 mg Oral Daily    aspirin  81 mg Oral Daily    atorvastatin  40 mg Oral Nightly    bisacodyl  5 mg Oral Nightly    citalopram  10 mg Oral Daily    doxazosin  1 mg Oral Nightly    DULoxetine  30 mg Oral Daily    [Held by provider] furosemide  40 mg Oral Daily    guanFACINE  1 mg Oral QPM    isosorbide mononitrate  30 mg Oral Daily    nystatin   Topical BID    rivaroxaban  10 mg Oral Dinner    sodium chloride flush  5-40 mL IntraVENous 2 times per day     Continuous Infusions:   electrolyte-A 1,000 mL (12/01/22 1220)    sodium chloride       PRN Meds:LORazepam, traMADol, sodium chloride flush, sodium chloride, ondansetron **OR** ondansetron, acetaminophen **OR** acetaminophen, dicyclomine      Objective:      Physical Exam  Wt Readings from Last 3 Encounters:   11/26/22 272 lb 11.3 oz (123.7 kg)   07/27/21 281 lb 4.8 oz (127.6 kg)   12/11/19 (!) 315 lb 0.6 oz (142.9 kg)     Temp Readings from Last 3 Encounters:   12/01/22 97.7 °F (36.5 °C) (Axillary)   07/27/21 97.9 °F (36.6 °C) (Skin)   06/15/21 98.8 °F (37.1 °C) (Infrared)     BP Readings from Last 3 Encounters:   12/01/22 120/82   07/27/21 133/73   09/10/19 137/67 Pulse Readings from Last 3 Encounters:   12/01/22 (!) 118   09/10/19 70   06/11/19 68    Gen: alert, awake  Neck: No JVD  Skin: Unremarkable  Cardiovascular:  S1, S2 without m/r/g   Respiratory: CTA B without w/r/r; respiratory effort normal  Abdomen:  soft, nt, nd,   Extremities: Chronic lymphedema of lower extremities  Neuro/Psy: Alert, confused         Lab Review   Lab Results   Component Value Date    WBC 10.8 11/27/2022    HGB 12.7 11/27/2022    HCT 38.6 11/27/2022    MCV 96.6 11/27/2022     11/27/2022     Lab Results   Component Value Date/Time     12/01/2022 05:21 AM    K 3.8 12/01/2022 05:21 AM    K 3.8 12/01/2022 05:21 AM     12/01/2022 05:21 AM    CO2 22 12/01/2022 05:21 AM    BUN 21 12/01/2022 05:21 AM    CREATININE 1.5 12/01/2022 05:21 AM    GLUCOSE 92 12/01/2022 05:21 AM    CALCIUM 8.6 12/01/2022 05:21 AM            Patient Active Problem List    Diagnosis Date Noted    Acute metabolic encephalopathy 84/50/6906    Complicated UTI (urinary tract infection) 11/29/2022    Dementia with agitation 11/29/2022    Constipation 11/26/2022    Elevated serum creatinine 11/26/2022    Visit for wound check 03/05/2019    Basal cell carcinoma of left eyelid 02/19/2019    SOB (shortness of breath)     Chronic diastolic congestive heart failure (HCC)     Rhabdomyolysis 02/20/2018    Cellulitis 12/01/2017    EDOUARD (acute kidney injury) ([facility] 12/01/2017    CKD (chronic kidney disease), stage III ([facility] 12/01/2017    Essential hypertension 12/01/2017    CHF (congestive heart failure) ([facility] 12/01/2017    Leukocytosis 12/01/2017    Primary osteoarthritis of right knee 10/01/2015    Knee pain, right 10/01/2015    Intractable abdominal pain     Acute pancreatitis        ASSESSMENT AND PLAN     #EDOUARD on CKD in the setting of possible UTI and decreased p.o. intake and relative hypotension.   Patient was on torsemide, metolazone and spironolactone as well-all held now  -creatinine trending down with IV fluids, urine output remains poor but back to baseline     #Chronic kidney disease stage IIIb with baseline creatinine of 1.8 to 2.4       #Chronic diastolic CHF   -She also has chronic lymphedema   #Dementia   Superimposed encephalopathy vs. Delirium    Improving      #UTI   ID following, on zosyn   #HTN- BP was low, holding parameters on BP meds  -BP better today  # Hypokalemia / stable

## 2022-12-01 NOTE — PROGRESS NOTES
Hospitalist Progress Note      PCP: Karon Jaramillo MD    Date of Admission: 11/26/2022    Chief Complaint:   Confusion    Hospital Course: This is a 28-year-old female with history of dementia. Patient unable to give accurate history. Per ED report patient with constipation for several days. Patient with increasing abdominal pain. Patient was mentating at baseline upon admission. Patient was also being treated for UTI. Patient has had decreased appetite with nausea. No emesis reported. Patient's mental status was improved this morning but worsened later in the morning. Patient was disorientated and paranoid. Subjective:   Seems to be less confused today. Limited insight.      Medications:  Reviewed    Infusion Medications    electrolyte-A 1,000 mL (11/30/22 1838)    sodium chloride       Scheduled Medications    piperacillin-tazobactam  3,375 mg IntraVENous Q8H    labetalol  50 mg Oral BID    hydrALAZINE  50 mg Oral TID    allopurinol  100 mg Oral Daily    aspirin  81 mg Oral Daily    atorvastatin  40 mg Oral Nightly    bisacodyl  5 mg Oral Nightly    citalopram  10 mg Oral Daily    doxazosin  1 mg Oral Nightly    DULoxetine  30 mg Oral Daily    [Held by provider] furosemide  40 mg Oral Daily    guanFACINE  1 mg Oral QPM    isosorbide mononitrate  30 mg Oral Daily    nystatin   Topical BID    rivaroxaban  10 mg Oral Dinner    sodium chloride flush  5-40 mL IntraVENous 2 times per day     PRN Meds: LORazepam, traMADol, sodium chloride flush, sodium chloride, ondansetron **OR** ondansetron, acetaminophen **OR** acetaminophen, dicyclomine      Intake/Output Summary (Last 24 hours) at 12/1/2022 1141  Last data filed at 12/1/2022 0648  Gross per 24 hour   Intake 360 ml   Output 525 ml   Net -165 ml         Physical Exam Performed:    /69   Pulse 98   Temp 97.5 °F (36.4 °C) (Oral)   Resp 18   Ht 5' 5\" (1.651 m)   Wt 272 lb 11.3 oz (123.7 kg)   SpO2 99%   BMI 45.38 kg/m²     General appearance: No apparent distress, appears stated age and cooperative. Patient lying in bed. HEENT: Pupils equal, round, and reactive to light. Conjunctivae/corneas clear. Neck: Supple, with full range of motion. No jugular venous distention. Trachea midline. Respiratory:  Normal respiratory effort. Clear to auscultation, bilaterally without Rales/Wheezes/Rhonchi. Cardiovascular: Regular rate and rhythm with normal S1/S2 without murmurs, rubs or gallops. Abdomen: Soft, non-tender, non-distended with normal bowel sounds. Musculoskeletal: No clubbing, cyanosis or edema bilaterally. Full range of motion without deformity. Skin: Skin color, texture, turgor normal.  No rashes or lesions. Neurologic:  Neurovascularly intact without any focal sensory/motor deficits. Cranial nerves: II-XII intact, grossly non-focal.  Psychiatric: Alert, improving orientation, limited insight  Capillary Refill: Brisk, 3 seconds, normal   Peripheral Pulses: +2 palpable, equal bilaterally       Labs:   No results for input(s): WBC, HGB, HCT, PLT in the last 72 hours. Recent Labs     11/29/22  0558 11/30/22  0626 12/01/22  0521    141 140   K 3.3* 3.6 3.8  3.8    108 109   CO2 22 22 22   BUN 28* 23* 21*   CREATININE 2.0* 1.7* 1.5*   CALCIUM 7.9* 8.3 8.6   PHOS  --   --  2.8       No results for input(s): AST, ALT, BILIDIR, BILITOT, ALKPHOS in the last 72 hours. No results for input(s): INR in the last 72 hours. No results for input(s): Cleatrice Gonzales in the last 72 hours. Urinalysis:      Lab Results   Component Value Date/Time    NITRU Negative 11/26/2022 08:34 PM    WBCUA >100 11/26/2022 08:34 PM    BACTERIA 2+ 11/26/2022 08:34 PM    RBCUA 3-4 11/26/2022 08:34 PM    BLOODU TRACE-INTACT 11/26/2022 08:34 PM    SPECGRAV 1.020 11/26/2022 08:34 PM    GLUCOSEU Negative 11/26/2022 08:34 PM       Radiology:  CT ABDOMEN PELVIS WO CONTRAST Additional Contrast? None   Final Result   1.  Constipation with large amount of stool within the sigmoid colon and   rectum. Otherwise, no acute intra-abdominal or pelvic abnormality with   limitations for a noncontrast CT scan. 2. Diverticulosis without obvious inflammation. IP CONSULT TO NEPHROLOGY  IP CONSULT TO INFECTIOUS DISEASES  IP CONSULT TO PALLIATIVE CARE    Assessment/Plan:    Active Hospital Problems    Diagnosis     Acute metabolic encephalopathy [R68.37]      Priority: Medium    Complicated UTI (urinary tract infection) [N39.0]      Priority: Medium    Dementia with agitation [F03.911]      Priority: Medium    Constipation [K59.00]      Priority: Medium    Elevated serum creatinine [R79.89]      Priority: Medium     Acute Metabolic encephalopathy with underlying dementia. Likely 2/2 UTI. Continue Abx per ID. Uses PRN Valium at [facility]. Will continue PRN Ativan for now. UTI: Abnormal UA. UCx showed Enterococcus and Pseudomonas. Abx sensitivity pattern is complex and may require IV agent(s). Significant pyuria on UA. ID following. Continue Zosyn with plan for PO Abx at [place]. Chronic anticoagulation. Hypotension. Ongoing low/normal BP. She is on multiple BP meds at baseline and these have been held. BP now improving. May need further adjustment to diuretic/BP regimen. Monitor. EDOUARD on CKD. Baseline creatinine around 1.5-1.8. IVF hydration. Nephrology following. Improved and near baseline. Monitor. DVT Prophylaxis: Xarelto  Diet: ADULT ORAL NUTRITION SUPPLEMENT; Breakfast, Dinner; Fortified Pudding Oral Supplement  ADULT ORAL NUTRITION SUPPLEMENT; Lunch, Dinner; Frozen Oral Supplement  ADULT DIET; Regular; No Added Salt (3-4 gm); 1500 ml  Code Status: Full Code  PT/OT Eval Status: Pending    Dispo -patient will need SNF placement on discharge; likely return to the [facility]. 1-2 days. ID recommended PO Abx.       Appropriate for A1 Discharge Unit: Khushi Clark MD

## 2022-12-01 NOTE — PROGRESS NOTES
Shift assessment completed. Pt A&O x2; disoriented to time and situation. VSS. AM medications admin per MAR. Pt tolerating diet; improved interest in food this am. Pt assisted w/ turning and personal care. Denies any needs at this time. Bed locked and in lowest position. Call light within reach. Will continue to monitor.  Electronically signed by Vee Kaplan RN on 12/1/2022 at 3:42 PM

## 2022-12-01 NOTE — PROGRESS NOTES
Occupational Therapy  Facility/Department: [facility] C3 TELE/MED SURG/ONC  Occupational Therapy Daily Treatment Note    Name: Whitley De La Rosa  : 3/16/1930  MRN: [id number]  Date of Service: 2022    Discharge Recommendations:  [address]      Completed co-tx with PT to safely address ADLs and mobility d/t extent of performance deficits. Patient Diagnosis(es): The primary encounter diagnosis was Constipation, unspecified constipation type. A diagnosis of EDOUARD (acute kidney injury) ([facility] [address] was also pertinent to this visit. Past Medical History:  has a past medical history of Arthritis, Basal cell carcinoma of left eyelid, Bladder leak, Cellulitis, Chronic back pain, Chronic diastolic CHF (congestive heart failure) ([address] CKD (chronic kidney disease), Dementia ([facility] [address] Depression, HTN (hypertension), Tinnitus, and Wears glasses. Past Surgical History:  has a past surgical history that includes Appendectomy; Heel spur surgery; Cataract removal; Cholecystectomy; bladder suspension (); Breast surgery; and Mohs surgery (2019). Assessment   Performance deficits / Impairments: Decreased functional mobility ; Decreased ADL status; Decreased strength;Decreased safe awareness;Decreased cognition;Decreased endurance;Decreased balance;Decreased high-level IADLs;Decreased vision/visual deficit  Assessment: Pt is functioning below baseline for ADLs and mobility. She requires max x2 to roll in bed for pericare and repositioning. Unsafe to transfer OOB today d/t cognition and fatigue. Extensive assist needed for UE/LE ADLs. Recommend SNF for skilled OT to improve function. Cont OT in acute care.   Prognosis: Fair  Activity Tolerance  Activity Tolerance: Patient limited by fatigue;Treatment limited secondary to decreased cognition        Plan   Occupational Therapy Plan  Times Per Week: 3-5x/wk  Current Treatment Recommendations: Strengthening, Balance training, Functional mobility training, Endurance training, Safety education & training, Patient/Caregiver education & training, Self-Care / ADL, Home management training, Pain management, Cognitive reorientation, Positioning     Restrictions  Restrictions/Precautions  Restrictions/Precautions: Fall Risk, Bed Alarm  Position Activity Restriction  Other position/activity restrictions: thao, IV    Subjective   General  Chart Reviewed: Yes, Orders, Progress Notes, History and Physical  Patient assessed for rehabilitation services?: Yes  Family / Caregiver Present: No  Referring Practitioner: ADRIENNE Hernandez  Diagnosis: constipation  Subjective  Subjective: Pt agreeable to OT. Denies pain  General Comment  Comments: RN approved therapy        Objective   Heart Rate: (!) 118  Heart Rate Source: Monitor  BP: 120/82  BP Location: Right lower arm  BP Method: Automatic  Patient Position: Semi fowlers  MAP (Calculated): 95  Resp: 18  SpO2: 97 %  O2 Device: None (Room air)         Safety Devices  Type of Devices: Left in bed;Call light within reach; Bed alarm in place; All fall risk precautions in place;Nurse notified      ADL  Feeding: Stand by assistance; Beverage management  UE Dressing: Maximum assistance  LE Dressing: Dependent/Total  Toileting: Dependent/Total  Toileting Skilled Clinical Factors: thao     Activity Tolerance  Activity Tolerance: Patient limited by fatigue;Treatment limited secondary to decreased cognition;Patient limited by endurance  Bed mobility  Rolling to Left: Maximum assistance;2 Person assistance  Rolling to Right: Maximum assistance;2 Person assistance  Scooting: Dependent/Total;2 Person assistance  Bed Mobility Comments: Did not sit EOB today or transfer d/t low endurance and impaired cognition. Pt falling asleep initially. Cognition  Overall Cognitive Status: Exceptions  Arousal/Alertness: Delayed responses to stimuli;Inconsistent responses to stimuli  Following Commands: Follows one step commands with increased time; Follows one step commands with repetition; Inconsistently follows commands  Attention Span: Attends with cues to redirect  Memory: Decreased recall of recent events;Decreased short term memory;Decreased recall of biographical Information;Decreased recall of precautions;Decreased long term memory  Safety Judgement: Decreased awareness of need for assistance;Decreased awareness of need for safety  Problem Solving: Decreased awareness of errors;Assistance required to identify errors made;Assistance required to generate solutions;Assistance required to implement solutions;Assistance required to correct errors made  Insights: Not aware of deficits  Initiation: Requires cues for some  Sequencing: Requires cues for some  Orientation  Overall Orientation Status: Impaired  Orientation Level: Oriented to person;Disoriented to situation;Disoriented to time;Disoriented to place           A/AROM Exercises: Performed BUE AAROM exercises 10 x each for shoulders, elbows, forearms and hands, vc's for pt to participate in full ROM  Education Given To: Patient  Education Provided: Role of Therapy;Home Exercise Program;Orientation  Education Method: Verbal  Barriers to Learning: Cognition;Vision  Education Outcome: Continued education needed   Disease Specific Education: Pt educated on importance of OOB mobility, prevention of complications of bedrest, and general safety during hospitalization.  Pt verbalized understanding  AM-PAC Score      AM-[facility] Inpatient Daily Activity Raw Score: 10 (12/01/22 1507)  AM-PAC Inpatient ADL T-Scale Score : 27.31 (12/01/22 1507)  ADL Inpatient CMS 0-100% Score: 74.7 (12/01/22 1507)  ADL Inpatient CMS G-Code Modifier : CL (12/01/22 1507)     Goals  Short Term Goals  Time Frame for Short Term Goals: 1 week (12/6) unless otherwise noted--All Goals Ongoing 12/1/22  Short Term Goal 1: Pt will complete sup>sit transfer w/ modAx2 by 12/4  Short Term Goal 2: Pt will complete bed>chair transfer w/ modAx2 and LRAD  Short Term Goal 3: Pt will complete seated grooming task w/ SBA and set-up  Short Term Goal 4: Pt will complete toileting w/ Gladis and LRAD  Patient Goals   Patient goals : pt unable to state     Therapy Time   Individual Concurrent Group Co-treatment   Time In       1115   Time Out       1144   Minutes       29   Timed Code Treatment Minutes: 34 Minutes   If pt is discharged prior to next OT session, this note will serve as the discharge summary.   Betsy Soulier OT

## 2022-12-01 NOTE — PLAN OF CARE
Problem: Skin/Tissue Integrity  Goal: Absence of new skin breakdown  Description: 1. Monitor for areas of redness and/or skin breakdown  2. Assess vascular access sites hourly  3. Every 4-6 hours minimum:  Change oxygen saturation probe site  4. Every 4-6 hours:  If on nasal continuous positive airway pressure, respiratory therapy assess nares and determine need for appliance change or resting period.   Outcome: Progressing     Problem: Safety - Adult  Goal: Free from fall injury  Outcome: Progressing     Problem: ABCDS Injury Assessment  Goal: Absence of physical injury  Outcome: Progressing     Problem: Pain  Goal: Verbalizes/displays adequate comfort level or baseline comfort level  Outcome: Progressing     Problem: Chronic Conditions and Co-morbidities  Goal: Patient's chronic conditions and co-morbidity symptoms are monitored and maintained or improved  Outcome: Progressing     Problem: Nutrition Deficit:  Goal: Optimize nutritional status  Outcome: Progressing

## 2022-12-01 NOTE — PROGRESS NOTES
Infectious Disease Follow up Notes    CC :  complicated UTI; dementia with encephalopathy      Antibiotics:   Pip-tazo 3.375 q8    Admit Date:   11/26/2022  Hospital Day: 6    Subjective:   She remains AF   Much better today per RN, taking food, knows her name, appears to be at baseline     Objective:     Patient Vitals for the past 8 hrs:   BP Temp Temp src Pulse Resp SpO2   12/01/22 1123 102/69 -- -- 98 -- 99 %   12/01/22 0831 (!) 155/82 97.5 °F (36.4 °C) Oral 94 18 95 %   12/01/22 0645 (!) 165/76 97.9 °F (36.6 °C) Axillary 71 17 95 %         EXAM:  Resting comfortably, cooperative with exam   Morbidly obese    No acute skin changes  Abd soft, NT.  BS present       LINE:     PIV   Colindres       Scheduled Meds:   piperacillin-tazobactam  3,375 mg IntraVENous Q8H    labetalol  50 mg Oral BID    hydrALAZINE  50 mg Oral TID    allopurinol  100 mg Oral Daily    aspirin  81 mg Oral Daily    atorvastatin  40 mg Oral Nightly    bisacodyl  5 mg Oral Nightly    citalopram  10 mg Oral Daily    doxazosin  1 mg Oral Nightly    DULoxetine  30 mg Oral Daily    [Held by provider] furosemide  40 mg Oral Daily    guanFACINE  1 mg Oral QPM    isosorbide mononitrate  30 mg Oral Daily    nystatin   Topical BID    rivaroxaban  10 mg Oral Dinner    sodium chloride flush  5-40 mL IntraVENous 2 times per day       Continuous Infusions:   electrolyte-A 1,000 mL (11/30/22 1838)    sodium chloride            Data Review:    Lab Results   Component Value Date    WBC 10.8 11/27/2022    HGB 12.7 11/27/2022    HCT 38.6 11/27/2022    MCV 96.6 11/27/2022     11/27/2022     Lab Results   Component Value Date    CREATININE 1.5 (H) 12/01/2022    BUN 21 (H) 12/01/2022     12/01/2022    K 3.8 12/01/2022    K 3.8 12/01/2022     12/01/2022    CO2 22 12/01/2022       Hepatic Function Panel:   Lab Results   Component Value Date/Time    [facility] 166 11/26/2022 04:04 PM    ALT 20 11/26/2022 04:04 PM    AST 30 11/26/2022 04:04 PM    PROT 7.3 11/26/2022 04:04 PM    PROT 5.6 02/16/2013 10:33 AM    BILITOT 0.8 11/26/2022 04:04 PM    LABALBU 2.7 12/01/2022 05:21 AM       Cultures:   11/26   UC E faecalis and Psa   Enterococcus faecalis  Antibiotic Interpretation Microscan   Method Status     ampicillin Sensitive <=2 mcg/mL BACTERIAL SUSCEPTIBILITY PANEL BY SAGE       nitrofurantoin Sensitive <=16 mcg/mL BACTERIAL SUSCEPTIBILITY PANEL BY SAGE       tetracycline Resistant >=16 mcg/mL BACTERIAL SUSCEPTIBILITY PANEL BY SAGE       vancomycin Sensitive 1 mcg/mL BACTERIAL SUSCEPTIBILITY PANEL BY SAGE          Pseudomonas aeruginosa    Antibiotic Interpretation Microscan   Method Status     cefepime Sensitive 8 mcg/mL BACTERIAL SUSCEPTIBILITY PANEL BY SAGE       ciprofloxacin Sensitive <=1 mcg/mL BACTERIAL SUSCEPTIBILITY PANEL BY SAGE       gentamicin Sensitive <=4 mcg/mL BACTERIAL SUSCEPTIBILITY PANEL BY SAGE       meropenem Sensitive <=1 mcg/mL BACTERIAL SUSCEPTIBILITY PANEL BY SAGE       piperacillin-tazobactam Sensitive <=16 mcg/mL BACTERIAL SUSCEPTIBILITY PANEL BY SAGE       tobramycin Sensitive <=4 mcg/mL BACTERIAL SUSCEPTIBILITY PANEL BY SAGE       levofloxacin Intermediate 2.0 ug/ml BACTERIAL SUSCEPTIBILITY PANEL BY E-TEST              Radiology Review:  All pertinent images / reports were reviewed as a part of this visit. CT ap 11/26/22  Impression   1. Constipation with large amount of stool within the sigmoid colon and   rectum. Otherwise, no acute intra-abdominal or pelvic abnormality with   limitations for a noncontrast CT scan. 2. Diverticulosis without obvious inflammation.       Assessment:     Patient Active Problem List    Diagnosis Date Noted    Acute metabolic encephalopathy 82/34/4570     Priority: Medium    Complicated UTI (urinary tract infection) 11/29/2022     Priority: Medium    Dementia with agitation 11/29/2022     Priority: Medium    Constipation 11/26/2022 Priority: Medium    Elevated serum creatinine 11/26/2022     Priority: Medium    Visit for wound check 03/05/2019    Basal cell carcinoma of left eyelid 02/19/2019    SOB (shortness of breath)     Chronic diastolic congestive heart failure (HCC)     Rhabdomyolysis 02/20/2018    Cellulitis 12/01/2017    EDOUARD (acute kidney injury) ([facility] [address] 12/01/2017    CKD (chronic kidney disease), stage III ([facility] [address] 12/01/2017    Essential hypertension 12/01/2017    [facility]est[facility] 12/01/2017    Leukocytosis 12/01/2017    Primary osteoarthritis of right knee 10/01/2015    Knee pain, right 10/01/2015    Intractable abdominal pain     Acute pancreatitis        Dementia with acute encephalopathy      Admitted with abd pain, constipation  Was being treated for UTI with po cipro at the time of admission      UTI  Growth of amp-S E feacalis and lower level growth of a sensitive Pseudomonas which may reflect partial treatment prior to admission      No abx allergies   QTc was 531 on 11/27/22    Plan:     Change to po abx to complete the course - amox and cipro, both through 12/5/22  Colindres to be removed       No family in the room  D/w ROHITH Lorenz MD  Phone: [phone]   Fax : [phone]

## 2022-12-01 NOTE — PROGRESS NOTES
Colindres removed per order. Pure wick placed to measure urine output.  Electronically signed by Ilana Fisher RN on 12/1/2022 at 5:43 PM

## 2022-12-01 NOTE — PROGRESS NOTES
[employer] sent to  regarding removal of thao. Awaiting response.  Electronically signed by Ivett Carmona RN on 12/1/2022 at 10:04 AM

## 2022-12-01 NOTE — CONSULTS
Palliative Care Initial Note  Palliative Care Admit date:  12/1/22    Advance Directives:  pt has executed AD's and designated her dtr, Maria Victoria Corbin, as her healthcare proxy. In the absence of a Donzella Tyler is pts closest B[facility] so she would be considered NOK. Pt has a full code status though, in 2017 & 2018, pt had a limited to reflect DNR/DNI wishes. In talking w/ camille, she was clear that code status should still reflect DNR/DNI and asked that it be amended (done). Plan of care/goals:  Extensive phone d/w Maria Victoria Corbin who shared her frustration in noting pts changes in mentation PTA, but her requests to test for UTI were slow in coming. Given pts status PTA, Maria Victoria Corbin is hopeful that she'll eventually return to her baseline and wants to give every opportunity for that to occur. Maria Victoria Corbin did go on to express some concern as to whether or not pt \"has given up? \" and feels pt will declare herself \"one way or the other. \"  For now, Maria Victoria Corbin remains hopeful pt will return to mental and physical baseline. Social/Spiritual:  Pt has been in Assisted Living @ [facility] for ~2 yrs. Maria Victoria Anishjavier shared that pt often attended facility activities. She said that pt has been \"sharp as a tack 3 wks ago until covid\" and now this UTI, to the point pt was still focused appropriately on her finances. Maria Victoria Shaquille also shared, despite pts poor intake at this time, previously pt was agreeable to eating meals and drinking fluids and nutrition concerns had never been broached. Plan:  Code status amended to reflect DNR/DNI.   Maintain current level of care and return to SNF @ [facility] when d/c'd.      Reason for consult:  _X_ Advance Care Planning  ___ Transition of Care Planning  _X_ Psychosocial/Spiritual Support  ___ Symptom Management                                                                                                                                                                    Amol Starkey RN

## 2022-12-01 NOTE — PLAN OF CARE
Problem: Skin/Tissue Integrity  Goal: Absence of new skin breakdown  Description: 1. Monitor for areas of redness and/or skin breakdown  2. Assess vascular access sites hourly  3. Every 4-6 hours minimum:  Change oxygen saturation probe site  4. Every 4-6 hours:  If on nasal continuous positive airway pressure, respiratory therapy assess nares and determine need for appliance change or resting period.   11/30/2022 2049 by Orly Lamb RN  Outcome: Progressing  11/30/2022 1031 by Rik Dooley RN  Outcome: Progressing     Problem: Safety - Adult  Goal: Free from fall injury  11/30/2022 1031 by Rik Dooley RN  Outcome: Progressing     Problem: ABCDS Injury Assessment  Goal: Absence of physical injury  11/30/2022 1031 by Rik Dooley RN  Outcome: Progressing     Problem: Pain  Goal: Verbalizes/displays adequate comfort level or baseline comfort level  11/30/2022 1031 by Rik Dooley RN  Outcome: Progressing     Problem: Chronic Conditions and Co-morbidities  Goal: Patient's chronic conditions and co-morbidity symptoms are monitored and maintained or improved  11/30/2022 1031 by Rik Dooley RN  Outcome: Progressing

## 2022-12-01 NOTE — PROGRESS NOTES
Physical Therapy  Facility/Department: [facility] C3 TELE/MED SURG/ONC  Daily Treatment Note  NAME: Carmen Shah  : 3/16/1930  MRN: [id number]    Date of Service: 2022    Discharge Recommendations:  Subacute/Skilled Nursing Facility   PT Equipment Recommendations  Equipment Needed: No  Other: defer    [facility] 6 Clicks Inpatient Mobility:  AM-PAC Mobility Inpatient   How much difficulty turning over in bed?: A Lot  How much difficulty sitting down on / standing up from a chair with arms?: Unable  How much difficulty moving from lying on back to sitting on side of bed?: A Lot  How much help from another person moving to and from a bed to a chair?: Total  How much help from another person needed to walk in hospital room?: Total  How much help from another person for climbing 3-5 steps with a railing?: Total  AM-PAC Inpatient Mobility Raw Score : 8  AM-PAC Inpatient T-Scale Score : 28.52  Mobility Inpatient CMS 0-100% Score: 86.62  Mobility Inpatient CMS G-Code Modifier : CM     Patient Diagnosis(es): The primary encounter diagnosis was Constipation, unspecified constipation type. A diagnosis of EDOUARD (acute kidney injury) ([facility] [address] was also pertinent to this visit. Assessment   Assessment: Pt seen with OT as co-tx secondary to complexity of condition, decreased cognition, balance and safety.  Pt benefits from x 2 skilled hands to provide increased cues and feedback with mobility and improve safety and I; pt sleeping upon arrival but not resistant to PT, required verbal cues to complete tasks, pt requires Max A of 2 for bed mobility, declined further mobility possibly due to fatigue, pt able to follow commands for therex technique and requires cues to continue full set, pt will benefit from continued Acute Inpatient Skilled PT to address functional mobility deficits, agree with previous PT rec for SNF at [place]  Activity Tolerance: Patient limited by fatigue;Treatment limited secondary to decreased cognition;Patient limited by endurance  Equipment Needed: No  Other: defer     Plan    Physcial Therapy Plan  General Plan: 3-5 times per week  Specific Instructions for Next Treatment: progress mobility as tolerated  Current Treatment Recommendations: Strengthening;Balance training;Functional mobility training;Transfer training; Endurance training;Gait training;Pain management;Home exercise program;Safety education & training;Patient/Caregiver education & training; Therapeutic activities; Equipment evaluation, education, & procurement; Neuromuscular re-education;Positioning     Restrictions  Restrictions/Precautions  Restrictions/Precautions: Fall Risk, Bed Alarm  Position Activity Restriction  Other position/activity restrictions: BRAXTON thao,     Subjective    Subjective  Subjective: pt sleepy but agreeable to PT/OT co-tx  Pain: Pt denies c/o pain  Orientation  Overall Orientation Status: Impaired  Orientation Level: Oriented to person;Disoriented to situation;Disoriented to time;Disoriented to place  Cognition  Overall Cognitive Status: Exceptions  Arousal/Alertness: Delayed responses to stimuli;Inconsistent responses to stimuli  Following Commands: Follows one step commands with increased time; Follows one step commands with repetition; Inconsistently follows commands  Attention Span: Attends with cues to redirect  Memory: Decreased recall of recent events;Decreased short term memory;Decreased recall of biographical Information;Decreased recall of precautions;Decreased long term memory  Safety Judgement: Decreased awareness of need for assistance;Decreased awareness of need for safety  Problem Solving: Decreased awareness of errors;Assistance required to identify errors made;Assistance required to generate solutions;Assistance required to implement solutions;Assistance required to correct errors made  Insights: Not aware of deficits  Initiation: Requires cues for some  Sequencing: Requires cues for some     Objective   Vitals: 98 bpm, 102/69, 99% on room air      Bed Mobility Training  Bed Mobility Training: Yes  Overall Level of Assistance: Assist X2;Maximum assistance  Interventions: Tactile cues; Verbal cues  Rolling: Maximum assistance;Assist X2  Scooting: Assist X2;Total assistance  Transfer Training  Transfer Training: No  Gait Training  Gait Training: No     PT Exercises  Exercise Treatment: supine B LE ankle DF/PF, knee flex in limited range, SLR in limited range x 10 reps with verbal cues to complete task; sidelying hip abd x 10 reps  PROM Exercises: supine hip abd x 10 reps B LE     Safety Devices  Type of Devices: All fall risk precautions in place; Bed alarm in place;Call light within reach; Left in bed;Nurse notified       Goals  Short Term Goals  Time Frame for Short Term Goals: 7 days (12/06/22) unless otherwise noted  Short Term Goal 1: Pt will perform bed mobility with mod(A)x2 -- 11/30: maxA x 2  Short Term Goal 2: Pt will tolerate sitting EOB x5 minutes with min(A) -- 11/30: GOAL MET x 8 minutes CGA to Gladis  Short Term Goal 3: Pt will tolerate transfer assessment -- 11/30: unsafe to attempt  Short Term Goal 4: Pt will perform 10 reps of BLE exercise with AAROM-AROM by 12/3/22 -- 11/30: able to complete x 10 AAROM LAQ, limited by cognition  Patient Goals   Patient Goals : none stated by pt due to cog    Education  Patient Education  Education Given To: Patient  Education Provided: Role of Therapy;Plan of Care;Orientation  Education Provided Comments: educated on role of PT, safety with mobility and re-oriented to situation  Education Method: Verbal  Barriers to Learning: Cognition  Education Outcome: Continued education needed; Unable to demonstrate understanding    Therapy Time   Individual Concurrent Group Co-treatment   Time In 1115         Time Out 1143         Minutes 28         Timed Code Treatment Minutes: 28 Minutes     If pt is unable to be seen after this session, please let this note serve as discharge summary.   Please see case management note for discharge disposition. Thank you.    Maximiliano Kiser, PT

## 2022-12-02 LAB
ALBUMIN SERPL-MCNC: 3 G/DL (ref 3.4–5)
ANION GAP SERPL CALCULATED.3IONS-SCNC: 9 MMOL/L (ref 3–16)
BUN BLDV-MCNC: 21 MG/DL (ref 7–20)
CALCIUM SERPL-MCNC: 8.5 MG/DL (ref 8.3–10.6)
CHLORIDE BLD-SCNC: 109 MMOL/L (ref 99–110)
CO2: 22 MMOL/L (ref 21–32)
CREAT SERPL-MCNC: 1.5 MG/DL (ref 0.6–1.2)
GFR SERPL CREATININE-BSD FRML MDRD: 32 ML/MIN/{1.73_M2}
GLUCOSE BLD-MCNC: 91 MG/DL (ref 70–99)
MAGNESIUM: 1.8 MG/DL (ref 1.8–2.4)
PHOSPHORUS: 2.6 MG/DL (ref 2.5–4.9)
POTASSIUM REFLEX MAGNESIUM: 3.5 MMOL/L (ref 3.5–5.1)
POTASSIUM SERPL-SCNC: 3.5 MMOL/L (ref 3.5–5.1)
SARS-COV-2, NAAT: NOT DETECTED
SODIUM BLD-SCNC: 140 MMOL/L (ref 136–145)

## 2022-12-02 PROCEDURE — 80069 RENAL FUNCTION PANEL: CPT

## 2022-12-02 PROCEDURE — 87635 SARS-COV-2 COVID-19 AMP PRB: CPT

## 2022-12-02 PROCEDURE — 6370000000 HC RX 637 (ALT 250 FOR IP): Performed by: INTERNAL MEDICINE

## 2022-12-02 PROCEDURE — 6370000000 HC RX 637 (ALT 250 FOR IP): Performed by: NURSE PRACTITIONER

## 2022-12-02 PROCEDURE — 51798 US URINE CAPACITY MEASURE: CPT

## 2022-12-02 PROCEDURE — 51701 INSERT BLADDER CATHETER: CPT

## 2022-12-02 PROCEDURE — 2580000003 HC RX 258: Performed by: NURSE PRACTITIONER

## 2022-12-02 PROCEDURE — 6360000002 HC RX W HCPCS: Performed by: INTERNAL MEDICINE

## 2022-12-02 PROCEDURE — 83735 ASSAY OF MAGNESIUM: CPT

## 2022-12-02 PROCEDURE — 36415 COLL VENOUS BLD VENIPUNCTURE: CPT

## 2022-12-02 PROCEDURE — 1200000000 HC SEMI PRIVATE

## 2022-12-02 RX ORDER — TORSEMIDE 100 MG/1
50 TABLET ORAL DAILY
Status: DISCONTINUED | OUTPATIENT
Start: 2022-12-03 | End: 2022-12-02

## 2022-12-02 RX ADMIN — ALLOPURINOL 100 MG: 100 TABLET ORAL at 13:31

## 2022-12-02 RX ADMIN — AMOXICILLIN 500 MG: 250 CAPSULE ORAL at 13:45

## 2022-12-02 RX ADMIN — ACETAMINOPHEN 650 MG: 325 TABLET ORAL at 06:04

## 2022-12-02 RX ADMIN — DICYCLOMINE HYDROCHLORIDE 20 MG: 20 TABLET ORAL at 23:56

## 2022-12-02 RX ADMIN — AMOXICILLIN 500 MG: 250 CAPSULE ORAL at 06:04

## 2022-12-02 RX ADMIN — HYDRALAZINE HYDROCHLORIDE 50 MG: 50 TABLET, FILM COATED ORAL at 18:10

## 2022-12-02 RX ADMIN — LORAZEPAM 0.5 MG: 2 INJECTION INTRAMUSCULAR at 14:41

## 2022-12-02 RX ADMIN — SODIUM CHLORIDE, PRESERVATIVE FREE 10 ML: 5 INJECTION INTRAVENOUS at 13:39

## 2022-12-02 RX ADMIN — CIPROFLOXACIN 500 MG: 500 TABLET, FILM COATED ORAL at 20:54

## 2022-12-02 RX ADMIN — ATORVASTATIN CALCIUM 40 MG: 40 TABLET, FILM COATED ORAL at 20:55

## 2022-12-02 RX ADMIN — GUANFACINE 1 MG: 1 TABLET ORAL at 18:33

## 2022-12-02 RX ADMIN — RIVAROXABAN 10 MG: 10 TABLET, FILM COATED ORAL at 18:10

## 2022-12-02 RX ADMIN — TRAMADOL HYDROCHLORIDE 50 MG: 50 TABLET, COATED ORAL at 23:57

## 2022-12-02 RX ADMIN — BISACODYL 5 MG: 5 TABLET, COATED ORAL at 20:55

## 2022-12-02 RX ADMIN — NYSTATIN: 100000 CREAM TOPICAL at 20:57

## 2022-12-02 RX ADMIN — LABETALOL HYDROCHLORIDE 50 MG: 100 TABLET, FILM COATED ORAL at 13:31

## 2022-12-02 RX ADMIN — LORAZEPAM 0.5 MG: 2 INJECTION INTRAMUSCULAR at 20:56

## 2022-12-02 RX ADMIN — SODIUM CHLORIDE, PRESERVATIVE FREE 10 ML: 5 INJECTION INTRAVENOUS at 20:56

## 2022-12-02 RX ADMIN — CITALOPRAM HYDROBROMIDE 10 MG: 20 TABLET ORAL at 13:36

## 2022-12-02 RX ADMIN — ISOSORBIDE MONONITRATE 30 MG: 30 TABLET, EXTENDED RELEASE ORAL at 13:32

## 2022-12-02 RX ADMIN — NYSTATIN: 100000 CREAM TOPICAL at 14:44

## 2022-12-02 RX ADMIN — HYDRALAZINE HYDROCHLORIDE 50 MG: 50 TABLET, FILM COATED ORAL at 13:30

## 2022-12-02 RX ADMIN — DULOXETINE HYDROCHLORIDE 30 MG: 30 CAPSULE, DELAYED RELEASE ORAL at 13:34

## 2022-12-02 RX ADMIN — CIPROFLOXACIN 500 MG: 500 TABLET, FILM COATED ORAL at 13:32

## 2022-12-02 RX ADMIN — ASPIRIN 81 MG 81 MG: 81 TABLET ORAL at 13:36

## 2022-12-02 RX ADMIN — AMOXICILLIN 500 MG: 250 CAPSULE ORAL at 20:54

## 2022-12-02 ASSESSMENT — PAIN SCALES - WONG BAKER: WONGBAKER_NUMERICALRESPONSE: 6

## 2022-12-02 ASSESSMENT — PAIN DESCRIPTION - LOCATION
LOCATION: BACK
LOCATION: ABDOMEN

## 2022-12-02 ASSESSMENT — PAIN SCALES - GENERAL
PAINLEVEL_OUTOF10: 8
PAINLEVEL_OUTOF10: 3

## 2022-12-02 ASSESSMENT — PAIN DESCRIPTION - DESCRIPTORS: DESCRIPTORS: PATIENT UNABLE TO DESCRIBE

## 2022-12-02 NOTE — PLAN OF CARE
Problem: Skin/Tissue Integrity  Goal: Absence of new skin breakdown  Description: 1. Monitor for areas of redness and/or skin breakdown  2. Assess vascular access sites hourly  3. Every 4-6 hours minimum:  Change oxygen saturation probe site  4. Every 4-6 hours:  If on nasal continuous positive airway pressure, respiratory therapy assess nares and determine need for appliance change or resting period.   12/1/2022 2107 by Omar Thompson RN  Outcome: Progressing  12/1/2022 1536 by Jeanette Broussard RN  Outcome: Progressing     Problem: Safety - Adult  Goal: Free from fall injury  12/1/2022 2107 by Omar Thompson RN  Outcome: Progressing  Flowsheets (Taken 11/27/2022 0508 by Misty Don RN)  Free From Fall Injury: Instruct family/caregiver on patient safety  12/1/2022 1536 by Jeanette Broussard RN  Outcome: Progressing     Problem: ABCDS Injury Assessment  Goal: Absence of physical injury  12/1/2022 1536 by Jeanette Broussard RN  Outcome: Progressing     Problem: Pain  Goal: Verbalizes/displays adequate comfort level or baseline comfort level  12/1/2022 2107 by Omar Thompson RN  Outcome: Progressing  Flowsheets (Taken 12/1/2022 2107)  Verbalizes/displays adequate comfort level or baseline comfort level: Assess pain using appropriate pain scale  12/1/2022 1536 by Jeanette Broussard RN  Outcome: Progressing     Problem: Chronic Conditions and Co-morbidities  Goal: Patient's chronic conditions and co-morbidity symptoms are monitored and maintained or improved  12/1/2022 1536 by Jeanette Broussard RN  Outcome: Progressing     Problem: Nutrition Deficit:  Goal: Optimize nutritional status  12/1/2022 1536 by Jeanette Broussard RN  Outcome: Progressing

## 2022-12-02 NOTE — CARE COORDINATION
Chart reviewed day 3. Patient now on po atbx. Per Yolanda NEWBERRY, did not void, was straight cathed. Tentative transport arranged for 430pm with [facility] for return skilled to [facility] pending MD rounds. MD and facility updated. Rodriguez Dunn, ROHITH      Spoke with Dr Gwendolyn Blackwood will not d/c due to urine issues. Transportation moved to tomorrow, 12/3 at 430 with [facility].

## 2022-12-02 NOTE — PROGRESS NOTES
Comprehensive Nutrition Assessment    Type and Reason for Visit:  Reassess    Nutrition Recommendations/Plan:   Continue No added Salt diet regimen  Fluid restriction per MD  Continue Boost pudding and Magic cup as tolerated  Will monitor nutritional adequacy, nutrition-related labs, weights, BMs, and clinical progress      Malnutrition Assessment:  Malnutrition Status: At risk for malnutrition (Comment) (11/30/22 1343)    Context:  Acute Illness       Nutrition Assessment:    Follow up:  Currently on a no added salt diet with fluid restriction 1500 ml/day. Po intake 25-50% meals, % Magic cup on 12/1/22. Patient with other discipline at this time. Possible discharge tomorrow to SNF. Will continue to monitor. Nutrition Related Findings:    labs reviewed Wound Type: Pressure Injury, Stage II, Skin Tears       Current Nutrition Intake & Therapies:    Average Meal Intake: 26-50%  Average Supplements Intake: None Ordered  ADULT ORAL NUTRITION SUPPLEMENT; Breakfast, Dinner; Fortified Pudding Oral Supplement  ADULT ORAL NUTRITION SUPPLEMENT; Lunch, Dinner; Frozen Oral Supplement  ADULT DIET; Regular; No Added Salt (3-4 gm); 1500 ml    Anthropometric Measures:  Height: 5' 5\" (165.1 cm)  Ideal Body Weight (IBW): 125 lbs (57 kg)       Current Body Weight: 272 lb (123.4 kg), 217.6 % IBW.  Weight Source: Bed Scale  Current BMI (kg/m2): 45.3  Usual Body Weight:  (no weight hx per EMR)                       BMI Categories: Obese Class 3 (BMI 40.0 or greater)    Estimated Daily Nutrient Needs:  Energy Requirements Based On: Kcal/kg (25-30)  Weight Used for Energy Requirements: Ideal  Energy (kcal/day): 4023-2700 kcal  Weight Used for Protein Requirements: Ideal (1.0-1.2 g/kg)  Protein (g/day): 57-69 g     Fluid (ml/day): 1500 mL FR    Nutrition Diagnosis:   Inadequate oral intake related to inadequate protein-energy intake, increase demand for energy/nutrients as evidenced by wounds, intake 0-25%, intake 26-50%    Nutrition Interventions:   Food and/or Nutrient Delivery: Continue Current Diet, Continue Oral Nutrition Supplement  Nutrition Education/Counseling: No recommendation at this time  Coordination of Nutrition Care: Continue to monitor while inpatient       Goals:     Goals: PO intake 50% or greater, prior to discharge       Nutrition Monitoring and Evaluation:   Behavioral-Environmental Outcomes: None Identified  Food/Nutrient Intake Outcomes: Food and Nutrient Intake, Supplement Intake  Physical Signs/Symptoms Outcomes: Biochemical Data, Meal Time Behavior, Nutrition Focused Physical Findings, Weight    Discharge Planning:     Too soon to determine     MARLEN, [address] [facility], [address] [place] [address],   Contact: [phone]

## 2022-12-02 NOTE — PROGRESS NOTES
[facility]  Nephrology Progress Note           CC: EDOUARD on CKD    HPI as of 11/27  80 y.o. yo female with PMH of chronic kidney disease stage III, hypertension, CHF with preserved EF, dementia, chronic lymphedema who is admitted for abdominal pain palpitation and fall  Patient was brought from nursing care facility to emergency room due to abdominal pain and not having bowel movement for 3 days. Patient also had palpitations at the nursing home.         SUBJECTIVE    She is doing better  Scr is 1.5  Off IV fluids  More alert  Poor urine output with retention after removing the thao     ROS:  - no fevers  - good urine volume     SOC: no visitors      Scheduled Meds:   amoxicillin  500 mg Oral 3 times per day    ciprofloxacin  500 mg Oral 2 times per day    labetalol  50 mg Oral BID    hydrALAZINE  50 mg Oral TID    allopurinol  100 mg Oral Daily    aspirin  81 mg Oral Daily    atorvastatin  40 mg Oral Nightly    bisacodyl  5 mg Oral Nightly    citalopram  10 mg Oral Daily    doxazosin  1 mg Oral Nightly    DULoxetine  30 mg Oral Daily    guanFACINE  1 mg Oral QPM    isosorbide mononitrate  30 mg Oral Daily    nystatin   Topical BID    rivaroxaban  10 mg Oral Dinner    sodium chloride flush  5-40 mL IntraVENous 2 times per day     Continuous Infusions:   sodium chloride       PRN Meds:LORazepam, traMADol, sodium chloride flush, sodium chloride, ondansetron **OR** ondansetron, acetaminophen **OR** acetaminophen, dicyclomine      Objective:      Physical Exam  Wt Readings from Last 3 Encounters:   11/26/22 272 lb 11.3 oz (123.7 kg)   07/27/21 281 lb 4.8 oz (127.6 kg)   12/11/19 (!) 315 lb 0.6 oz (142.9 kg)     Temp Readings from Last 3 Encounters:   12/02/22 97.6 °F (36.4 °C) (Oral)   07/27/21 97.9 °F (36.6 °C) (Skin)   06/15/21 98.8 °F (37.1 °C) (Infrared)     BP Readings from Last 3 Encounters:   12/02/22 (!) 150/97   07/27/21 133/73   09/10/19 137/67     Pulse Readings from Last 3 Encounters:   12/02/22 (!) 110 09/10/19 70   06/11/19 68    Gen: alert, awake  Neck: No JVD  Skin: Unremarkable  Cardiovascular:  S1, S2 without m/r/g   Respiratory: CTA B without w/r/r; respiratory effort normal  Abdomen:  soft, nt, nd,   Extremities: Chronic lymphedema of lower extremities  Neuro/Psy: Alert, confused         Lab Review   Lab Results   Component Value Date    WBC 10.8 11/27/2022    HGB 12.7 11/27/2022    HCT 38.6 11/27/2022    MCV 96.6 11/27/2022     11/27/2022     Lab Results   Component Value Date/Time     12/02/2022 06:09 AM    K 3.5 12/02/2022 06:09 AM    K 3.5 12/02/2022 06:09 AM     12/02/2022 06:09 AM    CO2 22 12/02/2022 06:09 AM    BUN 21 12/02/2022 06:09 AM    CREATININE 1.5 12/02/2022 06:09 AM    GLUCOSE 91 12/02/2022 06:09 AM    CALCIUM 8.5 12/02/2022 06:09 AM            Patient Active Problem List    Diagnosis Date Noted    Acute metabolic encephalopathy 64/23/7849    Complicated UTI (urinary tract infection) 11/29/2022    Dementia with agitation 11/29/2022    Constipation 11/26/2022    Elevated serum creatinine 11/26/2022    Visit for wound check 03/05/2019    Basal cell carcinoma of left eyelid 02/19/2019    SOB (shortness of breath)     Chronic diastolic congestive heart failure (HCC)     Rhabdomyolysis 02/20/2018    Cellulitis 12/01/2017    EDOUARD (acute kidney injury) ([address] 12/01/2017    CKD (chronic kidney disease), stage III ([address] 12/01/2017    Essential hypertension 12/01/2017    CHF (congestive heart failure) ([address] 12/01/2017    Leukocytosis 12/01/2017    Primary osteoarthritis of right knee 10/01/2015    Knee pain, right 10/01/2015    Intractable abdominal pain     Acute pancreatitis        ASSESSMENT AND PLAN     #EDOUARD on CKD in the setting of possible UTI and decreased p.o. intake and relative hypotension.   Patient was on torsemide, metolazone and spironolactone as well-all held now  -creatinine trending down with IV fluids, urine output remains poor but back to baseline     #Chronic kidney disease stage IIIb with baseline creatinine of 1.8 to 2.4    She is doing remarkably well, no swelling and Scr of 1.5   She had been on torsemide mon-Friday for worsening edema but this was reduced to MWF   I think she needs additional dose reduction, PRN dosing has been difficult so perhaps schedule Mon and Friday   Monitor labs 2x months at the [facility] to decide if dose needs to be reduced more    Discharge planning for tomorrow      #Chronic diastolic CHF   -She also has chronic lymphedema   #Dementia   Superimposed encephalopathy vs. Delirium    Improving      #UTI   ID following   Col[place]res removed and having urinary retention     #HTN- BP was low, holding parameters on BP meds  -BP better today  # Hypokalemia / stable movement

## 2022-12-02 NOTE — PROGRESS NOTES
Hospitalist Progress Note      PCP: Zahra Brooks MD    Date of Admission: 11/26/2022    Chief Complaint:   Confusion    Hospital Course: This is a 60-year-old female with history of dementia. Patient unable to give accurate history. Per ED report patient with constipation for several days. Patient with increasing abdominal pain. Patient was mentating at baseline upon admission. Patient was also being treated for UTI. Patient has had decreased appetite with nausea. No emesis reported. Patient's mental status was improved this morning but worsened later in the morning. Patient was disorientated and paranoid. Subjective:   More alert and interactive. Improving insight. Low UOP since thao removed, but no retention noted.      Medications:  Reviewed    Infusion Medications    sodium chloride       Scheduled Medications    amoxicillin  500 mg Oral 3 times per day    ciprofloxacin  500 mg Oral 2 times per day    labetalol  50 mg Oral BID    hydrALAZINE  50 mg Oral TID    allopurinol  100 mg Oral Daily    aspirin  81 mg Oral Daily    atorvastatin  40 mg Oral Nightly    bisacodyl  5 mg Oral Nightly    citalopram  10 mg Oral Daily    doxazosin  1 mg Oral Nightly    DULoxetine  30 mg Oral Daily    [Held by provider] furosemide  40 mg Oral Daily    guanFACINE  1 mg Oral QPM    isosorbide mononitrate  30 mg Oral Daily    nystatin   Topical BID    rivaroxaban  10 mg Oral Dinner    sodium chloride flush  5-40 mL IntraVENous 2 times per day     PRN Meds: LORazepam, traMADol, sodium chloride flush, sodium chloride, ondansetron **OR** ondansetron, acetaminophen **OR** acetaminophen, dicyclomine      Intake/Output Summary (Last 24 hours) at 12/2/2022 1009  Last data filed at 12/2/2022 0851  Gross per 24 hour   Intake 460 ml   Output 390 ml   Net 70 ml         Physical Exam Performed:    BP (!) 150/97   Pulse (!) 110   Temp 97.6 °F (36.4 °C) (Oral)   Resp 24   Ht 5' 5\" (1.651 m)   Wt 272 lb 11.3 oz (123.7 kg)   SpO2 97%   BMI 45.38 kg/m²     General appearance: No apparent distress, appears stated age and cooperative. Patient lying in bed. HEENT: Pupils equal, round, and reactive to light. Conjunctivae/corneas clear. Neck: Supple, with full range of motion. No jugular venous distention. Trachea midline. Respiratory:  Normal respiratory effort. Clear to auscultation, bilaterally without Rales/Wheezes/Rhonchi. Cardiovascular: Regular rate and rhythm with normal S1/S2 without murmurs, rubs or gallops. Abdomen: Soft, non-tender, non-distended with normal bowel sounds. Musculoskeletal: No clubbing, cyanosis or edema bilaterally. Full range of motion without deformity. Skin: Skin color, texture, turgor normal.  No rashes or lesions. Neurologic:  Neurovascularly intact without any focal sensory/motor deficits. Cranial nerves: II-XII intact, grossly non-focal.  Psychiatric: Alert, improving orientation, limited insight  Capillary Refill: Brisk, 3 seconds, normal   Peripheral Pulses: +2 palpable, equal bilaterally       Labs:   No results for input(s): WBC, HGB, HCT, PLT in the last 72 hours. Recent Labs     11/30/22  0626 12/01/22  0521 12/02/22  0609    140 140   K 3.6 3.8  3.8 3.5  3.5    109 109   CO2 22 22 22   BUN 23* 21* 21*   CREATININE 1.7* 1.5* 1.5*   CALCIUM 8.3 8.6 8.5   PHOS  --  2.8 2.6       No results for input(s): AST, ALT, BILIDIR, BILITOT, ALKPHOS in the last 72 hours. No results for input(s): INR in the last 72 hours. No results for input(s): [place] Shania in the last 72 hours.       Urinalysis:      Lab Results   Component Value Date/Time    NITRU Negative 11/26/2022 08:34 PM    WBCUA >100 11/26/2022 08:34 PM    BACTERIA 2+ 11/26/2022 08:34 PM    RBCUA 3-4 11/26/2022 08:34 PM    BLOODU TRACE-INTACT 11/26/2022 08:34 PM    SPECGRAV 1.020 11/26/2022 08:34 PM    GLUCOSEU Negative 11/26/2022 08:34 PM       Radiology:  CT ABDOMEN PELVIS WO CONTRAST Additional Contrast? None Final Result   1. Constipation with large amount of stool within the sigmoid colon and   rectum. Otherwise, no acute intra-abdominal or pelvic abnormality with   limitations for a noncontrast CT scan. 2. Diverticulosis without obvious inflammation. IP CONSULT TO NEPHROLOGY  IP CONSULT TO INFECTIOUS DISEASES  IP CONSULT TO PALLIATIVE CARE    Assessment/Plan:    Active Hospital Problems    Diagnosis     Acute metabolic encephalopathy [Z07.48]      Priority: Medium    Complicated UTI (urinary tract infection) [N39.0]      Priority: Medium    Dementia with agitation [F03.911]      Priority: Medium    Constipation [K59.00]      Priority: Medium    Elevated serum creatinine [R79.89]      Priority: Medium     Acute Metabolic encephalopathy with underlying dementia. Likely 2/2 UTI. Continue Abx per ID. Uses PRN Valium at [facility]. Will continue PRN Ativan for now. UTI: Abnormal UA. UCx showed Enterococcus and Pseudomonas. Significant pyuria on UA. ID following. Changed to PO Abx in anticipation of DC. Chronic anticoagulation. Continue Xarelto. Hypotension. Likely 2/2 above. Improved. HTN, controlled. Regimen/diuretics adjusted d/t infection, hypotension. Monitor. EDOUARD on CKD. Baseline creatinine around 1.5-1.8. IVF hydration. Nephrology following. Improved and near baseline. Monitor. Thao removed 12/1; still low UOP but no retention on bladder scan. Continue to monitor for now with hopes to avoid replacing thao if possible. DVT Prophylaxis: Xarelto  Diet: ADULT ORAL NUTRITION SUPPLEMENT; Breakfast, Dinner; Fortified Pudding Oral Supplement  ADULT ORAL NUTRITION SUPPLEMENT; Lunch, Dinner; Frozen Oral Supplement  ADULT DIET; Regular; No Added Salt (3-4 gm); 1500 ml  Code Status: Limited  PT/OT Eval Status: SNF    Dispo -patient will need SNF placement on discharge; anticipate return to the [facility] on 12/3/22 once voiding issues resolved.      Appropriate for A1 Discharge Unit: No    Papito Negro MD

## 2022-12-03 LAB
ANION GAP SERPL CALCULATED.3IONS-SCNC: 9 MMOL/L (ref 3–16)
BUN BLDV-MCNC: 19 MG/DL (ref 7–20)
CALCIUM SERPL-MCNC: 8.5 MG/DL (ref 8.3–10.6)
CHLORIDE BLD-SCNC: 109 MMOL/L (ref 99–110)
CO2: 22 MMOL/L (ref 21–32)
CREAT SERPL-MCNC: 1.4 MG/DL (ref 0.6–1.2)
GFR SERPL CREATININE-BSD FRML MDRD: 35 ML/MIN/{1.73_M2}
GLUCOSE BLD-MCNC: 91 MG/DL (ref 70–99)
POTASSIUM REFLEX MAGNESIUM: 3.6 MMOL/L (ref 3.5–5.1)
SODIUM BLD-SCNC: 140 MMOL/L (ref 136–145)

## 2022-12-03 PROCEDURE — 80048 BASIC METABOLIC PNL TOTAL CA: CPT

## 2022-12-03 PROCEDURE — 36415 COLL VENOUS BLD VENIPUNCTURE: CPT

## 2022-12-03 PROCEDURE — 51798 US URINE CAPACITY MEASURE: CPT

## 2022-12-03 PROCEDURE — 6370000000 HC RX 637 (ALT 250 FOR IP): Performed by: INTERNAL MEDICINE

## 2022-12-03 PROCEDURE — 1200000000 HC SEMI PRIVATE

## 2022-12-03 PROCEDURE — 2580000003 HC RX 258: Performed by: NURSE PRACTITIONER

## 2022-12-03 PROCEDURE — 6360000002 HC RX W HCPCS: Performed by: INTERNAL MEDICINE

## 2022-12-03 PROCEDURE — 6370000000 HC RX 637 (ALT 250 FOR IP): Performed by: NURSE PRACTITIONER

## 2022-12-03 PROCEDURE — 51701 INSERT BLADDER CATHETER: CPT

## 2022-12-03 RX ORDER — LORAZEPAM 0.5 MG/1
0.5 TABLET ORAL EVERY 4 HOURS PRN
Status: DISCONTINUED | OUTPATIENT
Start: 2022-12-03 | End: 2022-12-07 | Stop reason: HOSPADM

## 2022-12-03 RX ADMIN — LORAZEPAM 0.5 MG: 0.5 TABLET ORAL at 16:49

## 2022-12-03 RX ADMIN — HYDRALAZINE HYDROCHLORIDE 50 MG: 50 TABLET, FILM COATED ORAL at 21:50

## 2022-12-03 RX ADMIN — CIPROFLOXACIN 500 MG: 500 TABLET, FILM COATED ORAL at 10:44

## 2022-12-03 RX ADMIN — LABETALOL HYDROCHLORIDE 50 MG: 100 TABLET, FILM COATED ORAL at 21:51

## 2022-12-03 RX ADMIN — AMOXICILLIN 500 MG: 250 CAPSULE ORAL at 14:54

## 2022-12-03 RX ADMIN — NYSTATIN: 100000 CREAM TOPICAL at 10:44

## 2022-12-03 RX ADMIN — ASPIRIN 81 MG 81 MG: 81 TABLET ORAL at 10:45

## 2022-12-03 RX ADMIN — HYDRALAZINE HYDROCHLORIDE 50 MG: 50 TABLET, FILM COATED ORAL at 14:54

## 2022-12-03 RX ADMIN — DOXAZOSIN 1 MG: 2 TABLET ORAL at 21:52

## 2022-12-03 RX ADMIN — BISACODYL 5 MG: 5 TABLET, COATED ORAL at 21:52

## 2022-12-03 RX ADMIN — ISOSORBIDE MONONITRATE 30 MG: 30 TABLET, EXTENDED RELEASE ORAL at 10:45

## 2022-12-03 RX ADMIN — ALLOPURINOL 100 MG: 100 TABLET ORAL at 10:45

## 2022-12-03 RX ADMIN — HYDRALAZINE HYDROCHLORIDE 50 MG: 50 TABLET, FILM COATED ORAL at 10:46

## 2022-12-03 RX ADMIN — CIPROFLOXACIN 500 MG: 500 TABLET, FILM COATED ORAL at 21:50

## 2022-12-03 RX ADMIN — CITALOPRAM HYDROBROMIDE 10 MG: 20 TABLET ORAL at 10:44

## 2022-12-03 RX ADMIN — SODIUM CHLORIDE, PRESERVATIVE FREE 10 ML: 5 INJECTION INTRAVENOUS at 10:50

## 2022-12-03 RX ADMIN — GUANFACINE 1 MG: 1 TABLET ORAL at 16:49

## 2022-12-03 RX ADMIN — AMOXICILLIN 500 MG: 250 CAPSULE ORAL at 21:53

## 2022-12-03 RX ADMIN — RIVAROXABAN 10 MG: 10 TABLET, FILM COATED ORAL at 16:50

## 2022-12-03 RX ADMIN — TRAMADOL HYDROCHLORIDE 50 MG: 50 TABLET, COATED ORAL at 16:50

## 2022-12-03 RX ADMIN — LORAZEPAM 0.5 MG: 2 INJECTION INTRAMUSCULAR at 06:52

## 2022-12-03 RX ADMIN — DULOXETINE HYDROCHLORIDE 30 MG: 30 CAPSULE, DELAYED RELEASE ORAL at 10:44

## 2022-12-03 RX ADMIN — LABETALOL HYDROCHLORIDE 50 MG: 100 TABLET, FILM COATED ORAL at 10:45

## 2022-12-03 RX ADMIN — ATORVASTATIN CALCIUM 40 MG: 40 TABLET, FILM COATED ORAL at 21:53

## 2022-12-03 RX ADMIN — AMOXICILLIN 500 MG: 250 CAPSULE ORAL at 05:17

## 2022-12-03 ASSESSMENT — PAIN SCALES - PAIN ASSESSMENT IN ADVANCED DEMENTIA (PAINAD)
FACIALEXPRESSION: 1
TOTALSCORE: 3
BODYLANGUAGE: 1
CONSOLABILITY: 1
NEGVOCALIZATION: 2
BREATHING: 1
TOTALSCORE: 6
BODYLANGUAGE: 1
CONSOLABILITY: 0
FACIALEXPRESSION: 0
BREATHING: 1
BODYLANGUAGE: 1
TOTALSCORE: 4
NEGVOCALIZATION: 1
TOTALSCORE: 3
BREATHING: 0
BREATHING: 1
CONSOLABILITY: 1
NEGVOCALIZATION: 1
BODYLANGUAGE: 1
CONSOLABILITY: 0
FACIALEXPRESSION: 0
FACIALEXPRESSION: 1
NEGVOCALIZATION: 1

## 2022-12-03 ASSESSMENT — PAIN SCALES - GENERAL
PAINLEVEL_OUTOF10: 4
PAINLEVEL_OUTOF10: 6
PAINLEVEL_OUTOF10: 3

## 2022-12-03 NOTE — PROGRESS NOTES
MD PAGE via [place]:     FYI: No urinary output overnight. Bladder scan is 378 this morning. Per MD, will straight cath.     Straight cath volume: 350mL

## 2022-12-03 NOTE — PROGRESS NOTES
[employer]  Nephrology Progress Note           CC: EDOUARD on CKD    HPI as of 11/27  80 y.o. yo female with PMH of chronic kidney disease stage III, hypertension, CHF with preserved EF, dementia, chronic lymphedema who is admitted for abdominal pain palpitation and fall  Patient was brought from nursing care facility to emergency room due to abdominal pain and not having bowel movement for 3 days. Patient also had palpitations at the nursing home.         SUBJECTIVE    She is doing worse   Scr is 1.4  Off IV fluids  She is alert but confused, does not recognize me     ROS:  - no fevers  - poor urine volume, has required intermittent straight cath     [facility]: no visitors, talked with Charito Rivers on the phone       Scheduled Meds:   amoxicillin  500 mg Oral 3 times per day    ciprofloxacin  500 mg Oral 2 times per day    labetalol  50 mg Oral BID    hydrALAZINE  50 mg Oral TID    allopurinol  100 mg Oral Daily    aspirin  81 mg Oral Daily    atorvastatin  40 mg Oral Nightly    bisacodyl  5 mg Oral Nightly    citalopram  10 mg Oral Daily    doxazosin  1 mg Oral Nightly    DULoxetine  30 mg Oral Daily    guanFACINE  1 mg Oral QPM    isosorbide mononitrate  30 mg Oral Daily    nystatin   Topical BID    rivaroxaban  10 mg Oral Dinner    sodium chloride flush  5-40 mL IntraVENous 2 times per day     Continuous Infusions:   sodium chloride       PRN Meds:LORazepam, traMADol, sodium chloride flush, sodium chloride, ondansetron **OR** ondansetron, acetaminophen **OR** acetaminophen, dicyclomine      Objective:      Physical Exam  Wt Readings from Last 3 Encounters:   11/26/22 272 lb 11.3 oz (123.7 kg)   07/27/21 281 lb 4.8 oz (127.6 kg)   12/11/19 (!) 315 lb 0.6 oz (142.9 kg)     Temp Readings from Last 3 Encounters:   12/03/22 97.8 °F (36.6 °C) (Axillary)   07/27/21 97.9 °F (36.6 °C) (Skin)   06/15/21 98.8 °F (37.1 °C) (Infrared)     BP Readings from Last 3 Encounters:   12/03/22 (!) 144/96   07/27/21 133/73   09/10/19 137/67 Pulse Readings from Last 3 Encounters:   12/03/22 (!) 119   09/10/19 70   06/11/19 68    Gen: alert, awake  Neck: No JVD  Skin: Unremarkable  Cardiovascular:  S1, S2 without m/r/g   Respiratory: CTA B without w/r/r; respiratory effort normal  Abdomen:  soft, nt, nd,   Extremities: Chronic lymphedema of lower extremities  Neuro/Psy: Alert, confused         Lab Review   Lab Results   Component Value Date    WBC 10.8 11/27/2022    HGB 12.7 11/27/2022    HCT 38.6 11/27/2022    MCV 96.6 11/27/2022     11/27/2022     Lab Results   Component Value Date/Time     12/03/2022 05:30 AM    K 3.6 12/03/2022 05:30 AM     12/03/2022 05:30 AM    CO2 22 12/03/2022 05:30 AM    BUN 19 12/03/2022 05:30 AM    CREATININE 1.4 12/03/2022 05:30 AM    GLUCOSE 91 12/03/2022 05:30 AM    CALCIUM 8.5 12/03/2022 05:30 AM            Patient Active Problem List    Diagnosis Date Noted    Acute metabolic encephalopathy 75/91/2116    Complicated UTI (urinary tract infection) 11/29/2022    Dementia with agitation 11/29/2022    Constipation 11/26/2022    Elevated serum creatinine 11/26/2022    Visit for wound check 03/05/2019    Basal cell carcinoma of left eyelid 02/19/2019    SOB (shortness of breath)     Chronic diastolic congestive heart failure (HCC)     Rhabdomyolysis 02/20/2018    Cellulitis 12/01/2017    EDOUARD (acute kidney injury) ([facility] [address] 12/01/2017    CKD (chronic kidney disease), stage III ([address] 12/01/2017    Essential hypertension 12/01/2017    CHF (congestive heart failure) ([address] 12/01/2017    Leukocytosis 12/01/2017    Primary osteoarthritis of right knee 10/01/2015    Knee pain, right 10/01/2015    Intractable abdominal pain     Acute pancreatitis        ASSESSMENT AND PLAN     #EDOUARD on CKD in the setting of possible UTI and decreased p.o. intake and relative hypotension.   Patient was on torsemide, metolazone and spironolactone as well-all held now  -creatinine trending down with IV fluids, urine output remains poor but back to baseline     #Chronic kidney disease stage IIIb with baseline creatinine of 1.8 to 2.4    She is doing remarkably well, no swelling and Scr of 1.4   She had been on torsemide mon-Friday for worsening edema but this was reduced to MWF   I think she needs additional dose reduction, PRN dosing has been difficult so perhaps schedule Mon and Friday   Monitor labs 2x months at the [facility] [place] to decide if dose needs to be reduced more    For now it does not seem that she is eating/drinking well enough for diuretic.   Volume status is good       #Chronic diastolic CHF   -She also has chronic lymphedema   #Dementia   Superimposed encephalopathy vs. Delirium    Improving      #UTI   ID following   Colindres removed and having urinary retention     #HTN- BP was low, holding parameters on BP meds  -BP better today  # Hypokalemia / stable

## 2022-12-03 NOTE — PLAN OF CARE
Problem: Cardiovascular - Adult  Goal: Maintains optimal cardiac output and hemodynamic stability  Outcome: Progressing  Flowsheets (Taken 12/3/2022 1637)  Maintains optimal cardiac output and hemodynamic stability:   Monitor blood pressure and heart rate   Assess for signs of decreased cardiac output  Goal: Absence of cardiac dysrhythmias or at baseline  Outcome: Progressing  Flowsheets (Taken 12/3/2022 1637)  Absence of cardiac dysrhythmias or at baseline:   Monitor cardiac rate and rhythm   Assess for signs of decreased cardiac output     Problem: Genitourinary - Adult  Goal: Absence of urinary retention  Outcome: Not Progressing  Flowsheets (Taken 12/3/2022 1637)  Absence of urinary retention:   Assess patients ability to void and empty bladder   Monitor intake/output and perform bladder scan as needed   Discuss with Licensed Independent Practitioner  medications to alleviate retention as needed     Problem: Confusion  Goal: Confusion, delirium, dementia, or psychosis is improved or at baseline  Description: INTERVENTIONS:  1. Assess for possible contributors to thought disturbance, including medications, impaired vision or hearing, underlying metabolic abnormalities, dehydration, psychiatric diagnoses, and notify attending LIP  2. [place] high risk fall precautions, as indicated  3. Provide frequent short contacts to provide reality reorientation, refocusing and direction  4. Decrease environmental stimuli, including noise as appropriate  5. Monitor and intervene to maintain adequate nutrition, hydration, elimination, sleep and activity  6. If unable to ensure safety without constant attention obtain sitter and review sitter guidelines with assigned personnel  7.  Initiate Psychosocial CNS and Spiritual Care consult, as indicated  Outcome: Not Progressing  Flowsheets (Taken 12/3/2022 1637)  Effect of thought disturbance (confusion, delirium, dementia, or psychosis) are managed with adequate functional status:   Assess for contributors to thought disturbance, including medications, impaired vision or hearing, underlying metabolic abnormalities, dehydration, psychiatric diagnoses, notify [facility] high risk fall precautions, as indicated   Provide frequent short contacts to provide reality reorientation, refocusing and direction   Decrease environmental stimuli, including noise as appropriate   Monitor and intervene to maintain adequate nutrition, hydration, elimination, sleep and activity     Problem: Behavior  Goal: Pt/Family maintain appropriate behavior and adhere to behavioral management agreement, if implemented  Description: INTERVENTIONS:  1. Assess patient/family's coping skills and  non-compliant behavior (including use of illegal substances)  2. Notify security of behavior or suspected illegal substances which indicate the need for search of the family and/or belongings  3. Encourage verbalization of thoughts and concerns in a socially appropriate manner  4. Utilize positive, consistent limit setting strategies supporting safety of patient, staff and others  5. Encourage participation in the decision making process about the behavioral management agreement  6. If a visitor's behavior poses a threat to safety call refer to organization policy.   7. Initiate consult with , Psychosocial CNS, Spiritual Care as appropriate  Outcome: Not Progressing  Flowsheets (Taken 12/3/2022 1637)  Patient/family maintains appropriate behavior and adheres to behavioral management agreement, if implemented:   Assess patient/familys coping skills and  non-compliant behavior (including use of illegal substances)   Notify security of behavior or suspected illegal substances which indicate the need for search of the patient and/or belongings   Encourage verbalization of thoughts and concerns in a socially appropriate manner   Utilize positive, consistent limit setting strategies supporting safety of patient, staff and others

## 2022-12-03 NOTE — PROGRESS NOTES
4 Eyes Skin Assessment     The patient is being assess for  Shift Handoff    I agree that 2 RN's have performed a thorough Head to Toe Skin Assessment on the patient. ALL assessment sites listed below have been assessed. Areas assessed by both nurses: Tosha Real- RNs  [x]   Head, Face, and Ears   [x]   Shoulders, Back, and Chest  [x]   Arms, Elbows, and Hands RUE Skin tear  [x]   Coccyx, Sacrum, and Ischum- Discoloration on sacral area, stage II L buttocks- healing  [x]   Legs, Feet, and Heels        Does the Patient have Skin Breakdown?   Yes a wound was noted on the Admission Assessment and an WOUND LDA was Initiated documentation include the Fany-wound, Wound Assessment, Measurements, Dressing Treatment, Drainage, and Color\",         Zach Prevention initiated:  Yes   Wound Care Orders initiated:  Yes      [id number]  nurse consulted for Pressure Injury (Stage 3,4, Unstageable, DTI, NWPT, and Complex wounds):  Yes      Nurse 1 eSignature: Electronically signed by Fiona Urias RN on 12/3/22 at 4:56 PM EST    **SHARE this note so that the co-signing nurse is able to place an eSignature**    Nurse 2 eSignature: Electronically signed by Ashley Tobin RN on 12/3/22 at 5:01 PM EST

## 2022-12-03 NOTE — PLAN OF CARE
Problem: Safety - Adult  Goal: Free from fall injury  Outcome: Progressing     Problem: Skin/Tissue Integrity  Goal: Absence of new skin breakdown  Description: 1. Monitor for areas of redness and/or skin breakdown  2. Assess vascular access sites hourly  3. Every 4-6 hours minimum:  Change oxygen saturation probe site  4. Every 4-6 hours:  If on nasal continuous positive airway pressure, respiratory therapy assess nares and determine need for appliance change or resting period.   Outcome: Progressing     Problem: Safety - Adult  Goal: Free from fall injury  Outcome: Progressing     Problem: ABCDS Injury Assessment  Goal: Absence of physical injury  Outcome: Progressing     Problem: Pain  Goal: Verbalizes/displays adequate comfort level or baseline comfort level  Outcome: Progressing     Problem: Chronic Conditions and Co-morbidities  Goal: Patient's chronic conditions and co-morbidity symptoms are monitored and maintained or improved  Outcome: Progressing     Problem: Nutrition Deficit:  Goal: Optimize nutritional status  Outcome: Progressing  Flowsheets (Taken 12/2/2022 1350 by Vinh Pickens, RD, LD)  Nutrient intake appropriate for improving, restoring, or maintaining nutritional needs: Monitor oral intake, labs, and treatment plans

## 2022-12-03 NOTE — PROGRESS NOTES
Hospitalist Progress Note      PCP: Viri Cummings MD    Date of Admission: 11/26/2022    Chief Complaint:   Confusion    Hospital Course: This is a 77-year-old female with history of dementia. Patient unable to give accurate history. Per ED report patient with constipation for several days. Patient with increasing abdominal pain. Patient was mentating at baseline upon admission. Patient was also being treated for UTI. Patient has had decreased appetite with nausea. No emesis reported. Patient's mental status was improved this morning but worsened later in the morning. Patient was disorientated and paranoid. Subjective:   More confused. Still with low UOP; not retaining per se but straight cath performed due to no urine overnight and 370 returned.      Medications:  Reviewed    Infusion Medications    sodium chloride       Scheduled Medications    amoxicillin  500 mg Oral 3 times per day    ciprofloxacin  500 mg Oral 2 times per day    labetalol  50 mg Oral BID    hydrALAZINE  50 mg Oral TID    allopurinol  100 mg Oral Daily    aspirin  81 mg Oral Daily    atorvastatin  40 mg Oral Nightly    bisacodyl  5 mg Oral Nightly    citalopram  10 mg Oral Daily    doxazosin  1 mg Oral Nightly    DULoxetine  30 mg Oral Daily    guanFACINE  1 mg Oral QPM    isosorbide mononitrate  30 mg Oral Daily    nystatin   Topical BID    rivaroxaban  10 mg Oral Dinner    sodium chloride flush  5-40 mL IntraVENous 2 times per day     PRN Meds: LORazepam, traMADol, sodium chloride flush, sodium chloride, ondansetron **OR** ondansetron, acetaminophen **OR** acetaminophen, dicyclomine      Intake/Output Summary (Last 24 hours) at 12/3/2022 1055  Last data filed at 12/3/2022 0804  Gross per 24 hour   Intake 460 ml   Output 350 ml   Net 110 ml         Physical Exam Performed:    BP (!) 144/96   Pulse (!) 119   Temp 97.8 °F (36.6 °C) (Axillary)   Resp 18   Ht 5' 5\" (1.651 m)   Wt 272 lb 11.3 oz (123.7 kg)   SpO2 94% BMI 45.38 kg/m²     General appearance: No apparent distress, appears stated age and cooperative. Patient lying in bed. HEENT: Pupils equal, round, and reactive to light. Conjunctivae/corneas clear. Neck: Supple, with full range of motion. No jugular venous distention. Trachea midline. Respiratory:  Normal respiratory effort. Clear to auscultation, bilaterally without Rales/Wheezes/Rhonchi. Cardiovascular: Regular rate and rhythm with normal S1/S2 without murmurs, rubs or gallops. Abdomen: Soft, non-tender, non-distended with normal bowel sounds. Musculoskeletal: No clubbing, cyanosis or edema bilaterally. Full range of motion without deformity. Skin: Skin color, texture, turgor normal.  No rashes or lesions. Neurologic:  Neurovascularly intact without any focal sensory/motor deficits. Cranial nerves: II-XII intact, grossly non-focal.  Psychiatric: Alert, improving orientation, limited insight  Capillary Refill: Brisk, 3 seconds, normal   Peripheral Pulses: +2 palpable, equal bilaterally       Labs:   No results for input(s): WBC, HGB, HCT, PLT in the last 72 hours. Recent Labs     12/01/22  0521 12/02/22  0609 12/03/22  0530    140 140   K 3.8  3.8 3.5  3.5 3.6    109 109   CO2 22 22 22   BUN 21* 21* 19   CREATININE 1.5* 1.5* 1.4*   CALCIUM 8.6 8.5 8.5   PHOS 2.8 2.6  --        No results for input(s): AST, ALT, BILIDIR, BILITOT, ALKPHOS in the last 72 hours. No results for input(s): INR in the last 72 hours. No results for input(s): Julia Timo in the last 72 hours. Urinalysis:      Lab Results   Component Value Date/Time    NITRU Negative 11/26/2022 08:34 PM    WBCUA >100 11/26/2022 08:34 PM    BACTERIA 2+ 11/26/2022 08:34 PM    RBCUA 3-4 11/26/2022 08:34 PM    BLOODU TRACE-INTACT 11/26/2022 08:34 PM    SPECGRAV 1.020 11/26/2022 08:34 PM    GLUCOSEU Negative 11/26/2022 08:34 PM       Radiology:  CT ABDOMEN PELVIS WO CONTRAST Additional Contrast? None   Final Result   1. Constipation with large amount of stool within the sigmoid colon and   rectum. Otherwise, no acute intra-abdominal or pelvic abnormality with   limitations for a noncontrast CT scan. 2. Diverticulosis without obvious inflammation. IP CONSULT TO NEPHROLOGY  IP CONSULT TO INFECTIOUS DISEASES  IP CONSULT TO PALLIATIVE CARE    Assessment/Plan:    Active Hospital Problems    Diagnosis     Acute metabolic encephalopathy [M73.12]      Priority: Medium    Complicated UTI (urinary tract infection) [N39.0]      Priority: Medium    Dementia with agitation [F03.911]      Priority: Medium    Constipation [K59.00]      Priority: Medium    Elevated serum creatinine [R79.89]      Priority: Medium     Acute Metabolic encephalopathy with underlying dementia. Likely 2/2 UTI. Continue Abx per ID. Uses PRN Valium at [facility]. Will continue PRN Ativan for now. UTI: Abnormal UA. UCx showed Enterococcus and Pseudomonas. Significant pyuria on UA. ID following. Changed to PO Abx in anticipation of DC. Chronic anticoagulation. Continue Xarelto. Hypotension. Likely 2/2 above. Improved. HTN, controlled. Regimen/diuretics adjusted d/t infection, hypotension. Monitor. EDOUARD on CKD. Baseline creatinine around 1.5-1.8. IVF hydration. Nephrology following. Improved and near baseline. Monitor. Thao removed 12/1; still low UOP but no retention on bladder scan. Continue to monitor for now with hopes to avoid replacing thao if possible. DVT Prophylaxis: Xarelto  Diet: ADULT ORAL NUTRITION SUPPLEMENT; Breakfast, Dinner; Fortified Pudding Oral Supplement  ADULT ORAL NUTRITION SUPPLEMENT; Lunch, Dinner; Frozen Oral Supplement  ADULT DIET; Regular; No Added Salt (3-4 gm); 1500 ml  Code Status: Limited  PT/OT Eval Status: SNF    Dispo -patient will need SNF placement on discharge; anticipate return to the [facility] in 1-2 days once mental status, PO intake and UOP improved.      Appropriate for A1 Discharge Unit: No    Colon MD Nathan

## 2022-12-03 NOTE — PROGRESS NOTES
Shift assessments completed and charted. Pt is a/o to self only, becomes agitated and combative at times. Med administered as per [facility]. Voiced she feels the need to urinate but unable to do so, bladder scanned patient at this time- 153mL on scanner. Will continue to monitor.

## 2022-12-04 LAB
ALBUMIN SERPL-MCNC: 2.9 G/DL (ref 3.4–5)
ANION GAP SERPL CALCULATED.3IONS-SCNC: 8 MMOL/L (ref 3–16)
BUN BLDV-MCNC: 17 MG/DL (ref 7–20)
CALCIUM SERPL-MCNC: 8.8 MG/DL (ref 8.3–10.6)
CHLORIDE BLD-SCNC: 109 MMOL/L (ref 99–110)
CO2: 21 MMOL/L (ref 21–32)
CREAT SERPL-MCNC: 1.2 MG/DL (ref 0.6–1.2)
GFR SERPL CREATININE-BSD FRML MDRD: 42 ML/MIN/{1.73_M2}
GLUCOSE BLD-MCNC: 104 MG/DL (ref 70–99)
PHOSPHORUS: 3 MG/DL (ref 2.5–4.9)
POTASSIUM REFLEX MAGNESIUM: 3.8 MMOL/L (ref 3.5–5.1)
POTASSIUM SERPL-SCNC: 3.8 MMOL/L (ref 3.5–5.1)
SODIUM BLD-SCNC: 138 MMOL/L (ref 136–145)

## 2022-12-04 PROCEDURE — 51798 US URINE CAPACITY MEASURE: CPT

## 2022-12-04 PROCEDURE — 6370000000 HC RX 637 (ALT 250 FOR IP): Performed by: NURSE PRACTITIONER

## 2022-12-04 PROCEDURE — 6370000000 HC RX 637 (ALT 250 FOR IP): Performed by: INTERNAL MEDICINE

## 2022-12-04 PROCEDURE — 51701 INSERT BLADDER CATHETER: CPT

## 2022-12-04 PROCEDURE — 1200000000 HC SEMI PRIVATE

## 2022-12-04 PROCEDURE — 36415 COLL VENOUS BLD VENIPUNCTURE: CPT

## 2022-12-04 PROCEDURE — 80069 RENAL FUNCTION PANEL: CPT

## 2022-12-04 RX ADMIN — DOXAZOSIN 1 MG: 2 TABLET ORAL at 21:08

## 2022-12-04 RX ADMIN — HYDRALAZINE HYDROCHLORIDE 50 MG: 50 TABLET, FILM COATED ORAL at 21:08

## 2022-12-04 RX ADMIN — LABETALOL HYDROCHLORIDE 50 MG: 100 TABLET, FILM COATED ORAL at 21:27

## 2022-12-04 RX ADMIN — ISOSORBIDE MONONITRATE 30 MG: 30 TABLET, EXTENDED RELEASE ORAL at 10:53

## 2022-12-04 RX ADMIN — DULOXETINE HYDROCHLORIDE 30 MG: 30 CAPSULE, DELAYED RELEASE ORAL at 10:51

## 2022-12-04 RX ADMIN — ASPIRIN 81 MG 81 MG: 81 TABLET ORAL at 10:51

## 2022-12-04 RX ADMIN — HYDRALAZINE HYDROCHLORIDE 50 MG: 50 TABLET, FILM COATED ORAL at 15:22

## 2022-12-04 RX ADMIN — BISACODYL 5 MG: 5 TABLET, COATED ORAL at 21:33

## 2022-12-04 RX ADMIN — AMOXICILLIN 500 MG: 250 CAPSULE ORAL at 21:26

## 2022-12-04 RX ADMIN — RIVAROXABAN 10 MG: 10 TABLET, FILM COATED ORAL at 17:09

## 2022-12-04 RX ADMIN — LABETALOL HYDROCHLORIDE 50 MG: 100 TABLET, FILM COATED ORAL at 10:51

## 2022-12-04 RX ADMIN — NYSTATIN: 100000 CREAM TOPICAL at 21:08

## 2022-12-04 RX ADMIN — CIPROFLOXACIN 500 MG: 500 TABLET, FILM COATED ORAL at 21:28

## 2022-12-04 RX ADMIN — BISACODYL 5 MG: 5 TABLET, COATED ORAL at 21:08

## 2022-12-04 RX ADMIN — GUANFACINE 1 MG: 1 TABLET ORAL at 17:09

## 2022-12-04 RX ADMIN — NYSTATIN: 100000 CREAM TOPICAL at 11:01

## 2022-12-04 RX ADMIN — AMOXICILLIN 500 MG: 250 CAPSULE ORAL at 15:22

## 2022-12-04 RX ADMIN — ALLOPURINOL 100 MG: 100 TABLET ORAL at 10:51

## 2022-12-04 RX ADMIN — CITALOPRAM HYDROBROMIDE 10 MG: 20 TABLET ORAL at 10:53

## 2022-12-04 RX ADMIN — LABETALOL HYDROCHLORIDE 50 MG: 100 TABLET, FILM COATED ORAL at 21:08

## 2022-12-04 RX ADMIN — ATORVASTATIN CALCIUM 40 MG: 40 TABLET, FILM COATED ORAL at 21:27

## 2022-12-04 RX ADMIN — AMOXICILLIN 500 MG: 250 CAPSULE ORAL at 21:14

## 2022-12-04 RX ADMIN — HYDRALAZINE HYDROCHLORIDE 50 MG: 50 TABLET, FILM COATED ORAL at 21:27

## 2022-12-04 RX ADMIN — AMOXICILLIN 500 MG: 250 CAPSULE ORAL at 06:02

## 2022-12-04 RX ADMIN — CIPROFLOXACIN 500 MG: 500 TABLET, FILM COATED ORAL at 10:51

## 2022-12-04 RX ADMIN — HYDRALAZINE HYDROCHLORIDE 50 MG: 50 TABLET, FILM COATED ORAL at 10:53

## 2022-12-04 RX ADMIN — DOXAZOSIN 1 MG: 2 TABLET ORAL at 21:28

## 2022-12-04 RX ADMIN — CIPROFLOXACIN 500 MG: 500 TABLET, FILM COATED ORAL at 21:08

## 2022-12-04 RX ADMIN — ATORVASTATIN CALCIUM 40 MG: 40 TABLET, FILM COATED ORAL at 21:08

## 2022-12-04 ASSESSMENT — PAIN SCALES - GENERAL
PAINLEVEL_OUTOF10: 0
PAINLEVEL_OUTOF10: 0

## 2022-12-04 NOTE — PROGRESS NOTES
A&Ox1. Still with confusion. No complaints of chest pain or heaviness. No complaints of /SOB. Able to swallow crushed pill in applesauce. Bed on lowest position. Siderails 3/4 up. Will continue to monitor.

## 2022-12-04 NOTE — PROGRESS NOTES
Shift assessment completed and charted. Disoriented at times. Pt took pills whole and tolerated well. Did not eat much breakfast. Family was at bedside this morning. Pt is sleeping now.

## 2022-12-04 NOTE — PROGRESS NOTES
[employer]  Nephrology Progress Note           CC: EDOUARD on CKD    HPI as of 11/27  80 y.o. yo female with PMH of chronic kidney disease stage III, hypertension, CHF with preserved EF, dementia, chronic lymphedema who is admitted for abdominal pain palpitation and fall  Patient was brought from nursing care facility to emergency room due to abdominal pain and not having bowel movement for 3 days. Patient also had palpitations at the nursing home.         SUBJECTIVE    Not doing well  Low urine volume, requiring straight cath  Scr is 1.2   Not eating   Still confused     ROS:  - no fevers  - poor urine volume, has required intermittent straight cath     [facility]: no visitors      Scheduled Meds:   amoxicillin  500 mg Oral 3 times per day    ciprofloxacin  500 mg Oral 2 times per day    labetalol  50 mg Oral BID    hydrALAZINE  50 mg Oral TID    allopurinol  100 mg Oral Daily    aspirin  81 mg Oral Daily    atorvastatin  40 mg Oral Nightly    bisacodyl  5 mg Oral Nightly    citalopram  10 mg Oral Daily    doxazosin  1 mg Oral Nightly    DULoxetine  30 mg Oral Daily    guanFACINE  1 mg Oral QPM    isosorbide mononitrate  30 mg Oral Daily    nystatin   Topical BID    rivaroxaban  10 mg Oral Dinner    sodium chloride flush  5-40 mL IntraVENous 2 times per day     Continuous Infusions:   sodium chloride       PRN Meds:LORazepam, traMADol, sodium chloride flush, sodium chloride, ondansetron **OR** ondansetron, acetaminophen **OR** acetaminophen, dicyclomine      Objective:      Physical Exam  Wt Readings from Last 3 Encounters:   11/26/22 272 lb 11.3 oz (123.7 kg)   07/27/21 281 lb 4.8 oz (127.6 kg)   12/11/19 (!) 315 lb 0.6 oz (142.9 kg)     Temp Readings from Last 3 Encounters:   12/04/22 97.3 °F (36.3 °C) (Oral)   07/27/21 97.9 °F (36.6 °C) (Skin)   06/15/21 98.8 °F (37.1 °C) (Infrared)     BP Readings from Last 3 Encounters:   12/04/22 115/64   07/27/21 133/73   09/10/19 137/67     Pulse Readings from Last 3 Encounters: 12/04/22 68   09/10/19 70   06/11/19 68    Gen: alert, awake  Neck: No JVD  Skin: Unremarkable  Cardiovascular:  S1, S2 without m/r/g   Respiratory: CTA B without w/r/r; respiratory effort normal  Abdomen:  soft, nt, nd,   Extremities: Chronic lymphedema of lower extremities  Neuro/Psy: Alert, confused         Lab Review   Lab Results   Component Value Date    WBC 10.8 11/27/2022    HGB 12.7 11/27/2022    HCT 38.6 11/27/2022    MCV 96.6 11/27/2022     11/27/2022     Lab Results   Component Value Date/Time     12/04/2022 06:26 AM    K 3.8 12/04/2022 06:26 AM    K 3.8 12/04/2022 06:26 AM     12/04/2022 06:26 AM    CO2 21 12/04/2022 06:26 AM    BUN 17 12/04/2022 06:26 AM    CREATININE 1.2 12/04/2022 06:26 AM    GLUCOSE 104 12/04/2022 06:26 AM    CALCIUM 8.8 12/04/2022 06:26 AM            Patient Active Problem List    Diagnosis Date Noted    Acute metabolic encephalopathy 07/70/5291    Complicated UTI (urinary tract infection) 11/29/2022    Dementia with agitation 11/29/2022    Constipation 11/26/2022    Elevated serum creatinine 11/26/2022    Visit for wound check 03/05/2019    Basal cell carcinoma of left eyelid 02/19/2019    SOB (shortness of breath)     Chronic diastolic congestive heart failure (HCC)     Rhabdomyolysis 02/20/2018    Cellulitis 12/01/2017    EDOUARD (acute kidney injury) ([address] 12/01/2017    CKD (chronic kidney disease), stage III ([address] 12/01/2017    Essential hypertension 12/01/2017    CHF (congestive heart failure) ([address] 12/01/2017    Leukocytosis 12/01/2017    Primary osteoarthritis of right knee 10/01/2015    Knee pain, right 10/01/2015    Intractable abdominal pain     Acute pancreatitis        ASSESSMENT AND PLAN     #EDOUARD on CKD in the setting of possible UTI and decreased p.o. intake and relative hypotension.   Patient was on torsemide, metolazone and spironolactone as well-all held now  -creatinine trending down with IV fluids, urine output remains poor but back to baseline #Chronic kidney disease stage IIIb with baseline creatinine of 1.8 to 2.4    She had been on torsemide mon-Friday for worsening edema but this was reduced to MWF   I think she needs additional dose reduction, PRN dosing has been difficult   Based on current observation, would hold diuretics   Low urine volume but labs are stable, her intake is poor    May need gentle IV fluids if intake remains poor     #Chronic diastolic CHF   -She also has chronic lymphedema   - Compensated and edema is the best I have ever seen it    #AMS   Infectious encephalopathy    Delirium   Worsening      #UTI   ID following   Colindres removed and having urinary retention     #HTN- BP was low, holding parameters on BP meds  -BP better today  # Hypokalemia / stable

## 2022-12-04 NOTE — PROGRESS NOTES
Hospitalist Progress Note      PCP: Louis Stockton MD    Date of Admission: 11/26/2022    Chief Complaint:   Confusion    Hospital Course: This is a 80-year-old female with history of dementia. Patient unable to give accurate history. Per ED report patient with constipation for several days. Patient with increasing abdominal pain. Patient was mentating at baseline upon admission. Patient was also being treated for UTI. Patient has had decreased appetite with nausea. No emesis reported. Patient's mental status was improved this morning but worsened later in the morning. Patient was disorientated and paranoid. Subjective:   Still confused from baseline. Still with low UOP; not retaining per se but straight cath performed again.       Medications:  Reviewed    Infusion Medications    sodium chloride       Scheduled Medications    amoxicillin  500 mg Oral 3 times per day    ciprofloxacin  500 mg Oral 2 times per day    labetalol  50 mg Oral BID    hydrALAZINE  50 mg Oral TID    allopurinol  100 mg Oral Daily    aspirin  81 mg Oral Daily    atorvastatin  40 mg Oral Nightly    bisacodyl  5 mg Oral Nightly    citalopram  10 mg Oral Daily    doxazosin  1 mg Oral Nightly    DULoxetine  30 mg Oral Daily    guanFACINE  1 mg Oral QPM    isosorbide mononitrate  30 mg Oral Daily    nystatin   Topical BID    rivaroxaban  10 mg Oral Dinner    sodium chloride flush  5-40 mL IntraVENous 2 times per day     PRN Meds: LORazepam, traMADol, sodium chloride flush, sodium chloride, ondansetron **OR** ondansetron, acetaminophen **OR** acetaminophen, dicyclomine      Intake/Output Summary (Last 24 hours) at 12/4/2022 1225  Last data filed at 12/4/2022 0543  Gross per 24 hour   Intake 480 ml   Output 275 ml   Net 205 ml         Physical Exam Performed:    BP (!) 148/87   Pulse (!) 102   Temp 97.7 °F (36.5 °C) (Axillary)   Resp 16   Ht 5' 5\" (1.651 m)   Wt 272 lb 11.3 oz (123.7 kg)   SpO2 95%   BMI 45.38 kg/m² General appearance: No apparent distress, appears stated age and cooperative. Patient lying in bed. HEENT: Pupils equal, round, and reactive to light. Conjunctivae/corneas clear. Neck: Supple, with full range of motion. No jugular venous distention. Trachea midline. Respiratory:  Normal respiratory effort. Clear to auscultation, bilaterally without Rales/Wheezes/Rhonchi. Cardiovascular: Regular rate and rhythm with normal S1/S2 without murmurs, rubs or gallops. Abdomen: Soft, non-tender, non-distended with normal bowel sounds. Musculoskeletal: No clubbing, cyanosis or edema bilaterally. Full range of motion without deformity. Skin: Skin color, texture, turgor normal.  No rashes or lesions. Neurologic:  Neurovascularly intact without any focal sensory/motor deficits. Cranial nerves: II-XII intact, grossly non-focal.  Psychiatric: Alert, mild confusion, limited insight  Capillary Refill: Brisk, 3 seconds, normal   Peripheral Pulses: +2 palpable, equal bilaterally       Labs:   No results for input(s): WBC, HGB, HCT, PLT in the last 72 hours. Recent Labs     12/02/22  0609 12/03/22  0530 12/04/22  0626    140 138   K 3.5  3.5 3.6 3.8  3.8    109 109   CO2 22 22 21   BUN 21* 19 17   CREATININE 1.5* 1.4* 1.2   CALCIUM 8.5 8.5 8.8   PHOS 2.6  --  3.0       No results for input(s): AST, ALT, BILIDIR, BILITOT, ALKPHOS in the last 72 hours. No results for input(s): INR in the last 72 hours. No results for input(s): Kong Dumont in the last 72 hours. Urinalysis:      Lab Results   Component Value Date/Time    NITRU Negative 11/26/2022 08:34 PM    WBCUA >100 11/26/2022 08:34 PM    BACTERIA 2+ 11/26/2022 08:34 PM    RBCUA 3-4 11/26/2022 08:34 PM    BLOODU TRACE-INTACT 11/26/2022 08:34 PM    SPECGRAV 1.020 11/26/2022 08:34 PM    GLUCOSEU Negative 11/26/2022 08:34 PM       Radiology:  CT ABDOMEN PELVIS WO CONTRAST Additional Contrast? None   Final Result   1.  Constipation with large amount of stool within the sigmoid colon and   rectum. Otherwise, no acute intra-abdominal or pelvic abnormality with   limitations for a noncontrast CT scan. 2. Diverticulosis without obvious inflammation. IP CONSULT TO NEPHROLOGY  IP CONSULT TO INFECTIOUS DISEASES  IP CONSULT TO PALLIATIVE CARE    Assessment/Plan:    Active Hospital Problems    Diagnosis     Acute metabolic encephalopathy [R03.58]      Priority: Medium    Complicated UTI (urinary tract infection) [N39.0]      Priority: Medium    Dementia with agitation [F03.911]      Priority: Medium    Constipation [K59.00]      Priority: Medium    Elevated serum creatinine [R79.89]      Priority: Medium     Acute Metabolic encephalopathy with underlying dementia. Likely 2/2 UTI. Continue Abx per ID. Uses PRN Valium at [facility]. Will continue PRN Ativan for now. UTI: Abnormal UA. UCx showed Enterococcus and Pseudomonas. Significant pyuria on UA. ID following. Changed to PO Abx in anticipation of DC. Chronic anticoagulation. Continue Xarelto. Hypotension. Likely 2/2 above. Improved. HTN, controlled. Regimen/diuretics adjusted d/t infection, hypotension. Monitor. EDOUARD on CKD. Baseline creatinine around 1.5-1.8. IVF hydration. Nephrology following. Improved and near baseline. Monitor. Thao removed 12/1; still low UOP but no retention on bladder scan. Continue to monitor for now with hopes to avoid replacing thao if possible. Advised not to straight cath for low UOP, only for retention >400. DVT Prophylaxis: Xarelto  Diet: ADULT ORAL NUTRITION SUPPLEMENT; Breakfast, Dinner; Fortified Pudding Oral Supplement  ADULT ORAL NUTRITION SUPPLEMENT; Lunch, Dinner; Frozen Oral Supplement  ADULT DIET; Regular; No Added Salt (3-4 gm); 1500 ml  Code Status: Limited  PT/OT Eval Status: SNF    Dispo -patient will need SNF placement on discharge; anticipate return to the [facility] in 1-2 days once mental status, PO intake and UOP improved. Appropriate for A1 Discharge Unit: No    Sam Alvarado MD

## 2022-12-04 NOTE — PLAN OF CARE
Problem: Skin/Tissue Integrity  Goal: Absence of new skin breakdown  Description: 1. Monitor for areas of redness and/or skin breakdown  2. Assess vascular access sites hourly  3. Every 4-6 hours minimum:  Change oxygen saturation probe site  4. Every 4-6 hours:  If on nasal continuous positive airway pressure, respiratory therapy assess nares and determine need for appliance change or resting period.   Outcome: Progressing     Problem: Safety - Adult  Goal: Free from fall injury  Outcome: Progressing     Problem: ABCDS Injury Assessment  Goal: Absence of physical injury  Outcome: Progressing     Problem: Pain  Goal: Verbalizes/displays adequate comfort level or baseline comfort level  Outcome: Progressing     Problem: Chronic Conditions and Co-morbidities  Goal: Patient's chronic conditions and co-morbidity symptoms are monitored and maintained or improved  Outcome: Progressing     Problem: Nutrition Deficit:  Goal: Optimize nutritional status  Outcome: Progressing     Problem: Neurosensory - Adult  Goal: Achieves stable or improved neurological status  Outcome: Progressing  Goal: Achieves maximal functionality and self care  Outcome: Progressing     Problem: Cardiovascular - Adult  Goal: Maintains optimal cardiac output and hemodynamic stability  Outcome: Progressing  Goal: Absence of cardiac dysrhythmias or at baseline  Outcome: Progressing     Problem: Skin/Tissue Integrity - Adult  Goal: Skin integrity remains intact  Outcome: Progressing  Goal: Oral mucous membranes remain intact  Outcome: Progressing     Problem: Musculoskeletal - Adult  Goal: Return mobility to safest level of function  Outcome: Progressing  Goal: Return ADL status to a safe level of function  Outcome: Progressing     Problem: Genitourinary - Adult  Goal: Absence of urinary retention  Outcome: Progressing     Problem: Metabolic/Fluid and Electrolytes - Adult  Goal: Electrolytes maintained within normal limits  Outcome: Progressing  Goal: Hemodynamic stability and optimal renal function maintained  Outcome: Progressing  Goal: Glucose maintained within prescribed range  Outcome: Progressing     Problem: Anxiety  Goal: Will report anxiety at manageable levels  Description: INTERVENTIONS:  1. Administer medication as ordered  2. Teach and rehearse alternative coping skills  3. Provide emotional support with 1:1 interaction with staff  Outcome: Progressing     Problem: Confusion  Goal: Confusion, delirium, dementia, or psychosis is improved or at baseline  Description: INTERVENTIONS:  1. Assess for possible contributors to thought disturbance, including medications, impaired vision or hearing, underlying metabolic abnormalities, dehydration, psychiatric diagnoses, and notify attending LIP  2. [place] high risk fall precautions, as indicated  3. Provide frequent short contacts to provide reality reorientation, refocusing and direction  4. Decrease environmental stimuli, including noise as appropriate  5. Monitor and intervene to maintain adequate nutrition, hydration, elimination, sleep and activity  6. If unable to ensure safety without constant attention obtain sitter and review sitter guidelines with assigned personnel  7. Initiate Psychosocial CNS and Spiritual Care consult, as indicated  Outcome: Progressing     Problem: Behavior  Goal: Pt/Family maintain appropriate behavior and adhere to behavioral management agreement, if implemented  Description: INTERVENTIONS:  1. Assess patient/family's coping skills and  non-compliant behavior (including use of illegal substances)  2. Notify security of behavior or suspected illegal substances which indicate the need for search of the family and/or belongings  3. Encourage verbalization of thoughts and concerns in a socially appropriate manner  4. Utilize positive, consistent limit setting strategies supporting safety of patient, staff and others  5.  Encourage participation in the decision making process about the behavioral management agreement  6. If a visitor's behavior poses a threat to safety call refer to organization policy.   7. Initiate consult with , Psychosocial CNS, Spiritual Care as appropriate  Outcome: Progressing

## 2022-12-04 NOTE — PROGRESS NOTES
4 Eyes Skin Assessment     The patient is being assess for  Shift Handoff    I agree that 2 RN's have performed a thorough Head to Toe Skin Assessment on the patient. ALL assessment sites listed below have been assessed. Areas assessed by both nurses:    Ben So RN  [x]   Head, Face, and Ears   [x]   Shoulders, Back, and Chest  [x]   Arms, Elbows, and Hands   [x]   Coccyx, Sacrum, and IschIum  [x]   Legs, Feet, and Heels        Does the Patient have Skin Breakdown?   Yes LDA WOUND CARE was Initiated documentation include the Fany-wound, Wound Assessment, Measurements, Dressing Treatment, Drainage, and Color\",     Stage 2 R buttock- zinc applied        Zach Prevention initiated:  Yes   Wound Care Orders initiated:  Yes      [id number] nurse consulted for Pressure Injury (Stage 3,4, Unstageable, DTI, NWPT, and Complex wounds), New and Established Ostomies:  No      Nurse 1 eSignature: Electronically signed by Shyanne Livingston LPN on 79/3/06 at 81:95 PM EST    **SHARE this note so that the co-signing nurse is able to place an eSignature**    Nurse 2 eSignature: Electronically signed by Maria L Still RN on 12/4/22 at 6:09 PM EST

## 2022-12-05 ENCOUNTER — APPOINTMENT (OUTPATIENT)
Dept: VASCULAR LAB | Age: 87
DRG: 689 | End: 2022-12-05
Payer: MEDICARE

## 2022-12-05 LAB
ALBUMIN SERPL-MCNC: 2.8 G/DL (ref 3.4–5)
ANION GAP SERPL CALCULATED.3IONS-SCNC: 7 MMOL/L (ref 3–16)
BUN BLDV-MCNC: 16 MG/DL (ref 7–20)
CALCIUM SERPL-MCNC: 8.6 MG/DL (ref 8.3–10.6)
CHLORIDE BLD-SCNC: 108 MMOL/L (ref 99–110)
CO2: 24 MMOL/L (ref 21–32)
CREAT SERPL-MCNC: 1.2 MG/DL (ref 0.6–1.2)
GFR SERPL CREATININE-BSD FRML MDRD: 42 ML/MIN/{1.73_M2}
GLUCOSE BLD-MCNC: 98 MG/DL (ref 70–99)
PHOSPHORUS: 3 MG/DL (ref 2.5–4.9)
POTASSIUM SERPL-SCNC: 3.8 MMOL/L (ref 3.5–5.1)
SODIUM BLD-SCNC: 139 MMOL/L (ref 136–145)

## 2022-12-05 PROCEDURE — 1200000000 HC SEMI PRIVATE

## 2022-12-05 PROCEDURE — 6370000000 HC RX 637 (ALT 250 FOR IP): Performed by: INTERNAL MEDICINE

## 2022-12-05 PROCEDURE — 80069 RENAL FUNCTION PANEL: CPT

## 2022-12-05 PROCEDURE — 6370000000 HC RX 637 (ALT 250 FOR IP): Performed by: NURSE PRACTITIONER

## 2022-12-05 PROCEDURE — 51798 US URINE CAPACITY MEASURE: CPT

## 2022-12-05 PROCEDURE — 36415 COLL VENOUS BLD VENIPUNCTURE: CPT

## 2022-12-05 PROCEDURE — 93971 EXTREMITY STUDY: CPT

## 2022-12-05 PROCEDURE — 97535 SELF CARE MNGMENT TRAINING: CPT

## 2022-12-05 PROCEDURE — 97530 THERAPEUTIC ACTIVITIES: CPT

## 2022-12-05 RX ORDER — CIPROFLOXACIN 500 MG/1
500 TABLET, FILM COATED ORAL ONCE
Status: COMPLETED | OUTPATIENT
Start: 2022-12-05 | End: 2022-12-05

## 2022-12-05 RX ORDER — TAMSULOSIN HYDROCHLORIDE 0.4 MG/1
0.4 CAPSULE ORAL DAILY
Status: DISCONTINUED | OUTPATIENT
Start: 2022-12-05 | End: 2022-12-07

## 2022-12-05 RX ORDER — AMOXICILLIN 250 MG/1
500 CAPSULE ORAL ONCE
Status: COMPLETED | OUTPATIENT
Start: 2022-12-05 | End: 2022-12-05

## 2022-12-05 RX ADMIN — LABETALOL HYDROCHLORIDE 50 MG: 100 TABLET, FILM COATED ORAL at 20:48

## 2022-12-05 RX ADMIN — DOXAZOSIN 1 MG: 2 TABLET ORAL at 20:47

## 2022-12-05 RX ADMIN — TRAMADOL HYDROCHLORIDE 50 MG: 50 TABLET, COATED ORAL at 17:16

## 2022-12-05 RX ADMIN — ALLOPURINOL 100 MG: 100 TABLET ORAL at 09:30

## 2022-12-05 RX ADMIN — LABETALOL HYDROCHLORIDE 50 MG: 100 TABLET, FILM COATED ORAL at 09:30

## 2022-12-05 RX ADMIN — NYSTATIN: 100000 CREAM TOPICAL at 20:45

## 2022-12-05 RX ADMIN — ATORVASTATIN CALCIUM 40 MG: 40 TABLET, FILM COATED ORAL at 20:48

## 2022-12-05 RX ADMIN — CIPROFLOXACIN 500 MG: 500 TABLET, FILM COATED ORAL at 09:29

## 2022-12-05 RX ADMIN — NYSTATIN: 100000 CREAM TOPICAL at 09:33

## 2022-12-05 RX ADMIN — RIVAROXABAN 10 MG: 10 TABLET, FILM COATED ORAL at 17:21

## 2022-12-05 RX ADMIN — CIPROFLOXACIN 500 MG: 500 TABLET, FILM COATED ORAL at 22:03

## 2022-12-05 RX ADMIN — AMOXICILLIN 500 MG: 250 CAPSULE ORAL at 13:56

## 2022-12-05 RX ADMIN — ISOSORBIDE MONONITRATE 30 MG: 30 TABLET, EXTENDED RELEASE ORAL at 09:33

## 2022-12-05 RX ADMIN — CITALOPRAM HYDROBROMIDE 10 MG: 20 TABLET ORAL at 09:30

## 2022-12-05 RX ADMIN — HYDRALAZINE HYDROCHLORIDE 50 MG: 50 TABLET, FILM COATED ORAL at 20:47

## 2022-12-05 RX ADMIN — GUANFACINE 1 MG: 1 TABLET ORAL at 17:21

## 2022-12-05 RX ADMIN — TRAMADOL HYDROCHLORIDE 50 MG: 50 TABLET, COATED ORAL at 09:39

## 2022-12-05 RX ADMIN — HYDRALAZINE HYDROCHLORIDE 50 MG: 50 TABLET, FILM COATED ORAL at 13:56

## 2022-12-05 RX ADMIN — AMOXICILLIN 500 MG: 250 CAPSULE ORAL at 06:53

## 2022-12-05 RX ADMIN — ASPIRIN 81 MG 81 MG: 81 TABLET ORAL at 09:29

## 2022-12-05 RX ADMIN — BISACODYL 5 MG: 5 TABLET, COATED ORAL at 20:54

## 2022-12-05 RX ADMIN — TAMSULOSIN HYDROCHLORIDE 0.4 MG: 0.4 CAPSULE ORAL at 13:56

## 2022-12-05 RX ADMIN — DULOXETINE HYDROCHLORIDE 30 MG: 30 CAPSULE, DELAYED RELEASE ORAL at 09:29

## 2022-12-05 RX ADMIN — AMOXICILLIN 500 MG: 250 CAPSULE ORAL at 22:03

## 2022-12-05 ASSESSMENT — PAIN DESCRIPTION - ORIENTATION: ORIENTATION: RIGHT

## 2022-12-05 ASSESSMENT — PAIN DESCRIPTION - LOCATION: LOCATION: KNEE

## 2022-12-05 ASSESSMENT — PAIN SCALES - GENERAL
PAINLEVEL_OUTOF10: 7

## 2022-12-05 NOTE — PROGRESS NOTES
Physical Therapy  Facility/Department: [facility] C3 TELE/MED SURG/ONC  Daily Treatment Note  NAME: Thais Oshea  : 3/16/1930  MRN: [id number]    Date of Service: 2022    Discharge Recommendations:  Subacute/Skilled Nursing Facility   PT Equipment Recommendations  Equipment Needed: No  Other: defer    Patient Diagnosis(es): The primary encounter diagnosis was Constipation, unspecified constipation type. A diagnosis of EDOUARD (acute kidney injury) ([facility] [address] was also pertinent to this visit. Assessment   Assessment: Pt seen as co-treat with OT to safely progress functional mobility. Max A x 2 for bed mobility. Tolerated sitting EOB ~8 minutes. Limited d/t impaired balance and low back pain. Pt would benefit from continued skilled therapy to address deficits. Recommend SNF at d/c. Activity Tolerance: Patient limited by fatigue;Treatment limited secondary to decreased cognition;Patient limited by endurance; Patient limited by pain  Equipment Needed: No  Other: defer     Plan    Physcial Therapy Plan  General Plan: 3-5 times per week  Specific Instructions for Next Treatment: progress mobility as tolerated  Current Treatment Recommendations: Strengthening;Balance training;Functional mobility training;Transfer training; Endurance training;Gait training;Pain management;Home exercise program;Safety education & training;Patient/Caregiver education & training; Therapeutic activities; Equipment evaluation, education, & procurement; Neuromuscular re-education;Positioning     Restrictions  Restrictions/Precautions  Restrictions/Precautions: Fall Risk, Bed Alarm  Position Activity Restriction  Other position/activity restrictions: thao, BRAXTON     Subjective    Subjective  Subjective: Pt agreeable to therapy. Pain: Denies pain at rest. States she is uncomfortable laying in bed.  Increased low back pain seated EOB     Objective   Vitals  /64, ; SPO2 93% on RA       Bed Mobility Training  Bed Mobility Training: Yes  Overall Level of Assistance: Assist X2;Maximum assistance  Interventions: Tactile cues; Verbal cues;Manual cues  Rolling: Maximum assistance;Assist X2  Supine to Sit: Maximum assistance;Assist X2  Sit to Supine: Assist X2;Maximum assistance  Scooting: Assist X2;Total assistance (up to [facility])    Balance  Sitting: Impaired  Sitting - Static: Poor (constant support) (Pt sat EOB ~8 minutes. Grossly CGA. At times requiring total A to lean back d/t low back pain. PT facilitating balance/posture and OT facilitating ADLs.)  Sitting - Dynamic: Poor (constant support)    Transfer Training  Transfer Training: No (unsafe this date dt decreased sitting balance/tolerance)       PT Exercises  Exercise Treatment: supine x 5 BLE: ankle pumps, quad sets, glut sets -- cues for technique       Safety Devices  Type of Devices: Patient at risk for falls; All mariah prominences offloaded; All fall risk precautions in place; Bed alarm in place;Call light within reach; Heels elevated for pressure relief;Nurse notified;Telesitter in use;Left in bed       Goals  Short Term Goals  Time Frame for Short Term Goals: 7 days (12/06/22) unless otherwise noted  Short Term Goal 1: Pt will perform bed mobility with mod(A)x2 -- 12/05: maxA x 2  Short Term Goal 2: Pt will tolerate sitting EOB x5 minutes with min(A) -- 11/30: GOAL MET x 8 minutes CGA to [place]is  Short Term Goal 3: Pt will tolerate transfer assessment -- 12/05: unsafe to attempt  Short Term Goal 4: Pt will perform 10 reps of BLE exercise with AAROM-AROM by 12/3/22 -- PROGRESSING, 5 reps d/t impaired activity tolerance and pain  Patient Goals   Patient Goals : none stated by pt due to cog    [facility] 6 Clicks Inpatient Mobility:  AM-PAC Mobility Inpatient   How much difficulty turning over in bed?: A Lot  How much difficulty sitting down on / standing up from a chair with arms?: Unable  How much difficulty moving from lying on back to sitting on side of bed?: A Lot  How much help from another person moving to and from a bed to a chair?: Total  How much help from another person needed to walk in hospital room?: Total  How much help from another person for climbing 3-5 steps with a railing?: Total  AM-PAC Inpatient Mobility Raw Score : 8  AM-PAC Inpatient T-Scale Score : 28.52  Mobility Inpatient CMS 0-100% Score: 86.62  Mobility Inpatient CMS G-Code Modifier : CM    Education  Patient Education  Education Given To: Patient  Education Provided: Role of Therapy;Plan of Care;Orientation  Education Provided Comments: educated on role of PT, safety with mobility  Education Method: Verbal  Barriers to Learning: Cognition  Education Outcome: Continued education needed; Unable to demonstrate understanding    Therapy Time   Individual Concurrent Group Co-treatment   Time In 0837         Time Out 0901         Minutes 24         Timed Code Treatment Minutes: 24 Minutes       Thomas Barton, PT, DPT  If pt is unable to be seen after this session, please let this note serve as discharge summary. Please see case management note for discharge disposition. Thank you.

## 2022-12-05 NOTE — PROGRESS NOTES
Spoke with MD and Neph about low urine output and they want to bladder scan and nothing else done unless she has 4-500 in bladder. Will pass on.

## 2022-12-05 NOTE — PROGRESS NOTES
Pt alert with confusion. VSS. Shift assessment completed. Pt turned Q 2hrs with pillow support. Checked and changed as needed, heels off of bed. Bladders scanned with some retention per orders. Tramadol given for pain. Call light within reach, bed in lowest position, wheels locked. Bed alarm on and audible. AVASYS in place for safety.

## 2022-12-05 NOTE — PROGRESS NOTES
[email or web address]  Nephrology Progress Note           CC: EDOUARD on CKD    HPI as of 11/27  80 y.o. yo female with PMH of chronic kidney disease stage III, hypertension, CHF with preserved EF, dementia, chronic lymphedema who is admitted for abdominal pain palpitation and fall  Patient was brought from nursing care facility to emergency room due to abdominal pain and not having bowel movement for 3 days. Patient also had palpitations at the nursing home.         SUBJECTIVE      Low urine volume, requiring straight cath-PVR >500    Not eating   Still confused     ROS:  - no fevers  - poor urine volume, has required intermittent straight cath     [facility]: + visitors      Scheduled Meds:   amoxicillin  500 mg Oral 3 times per day    ciprofloxacin  500 mg Oral 2 times per day    labetalol  50 mg Oral BID    hydrALAZINE  50 mg Oral TID    allopurinol  100 mg Oral Daily    aspirin  81 mg Oral Daily    atorvastatin  40 mg Oral Nightly    bisacodyl  5 mg Oral Nightly    citalopram  10 mg Oral Daily    doxazosin  1 mg Oral Nightly    DULoxetine  30 mg Oral Daily    guanFACINE  1 mg Oral QPM    isosorbide mononitrate  30 mg Oral Daily    nystatin   Topical BID    rivaroxaban  10 mg Oral Dinner    sodium chloride flush  5-40 mL IntraVENous 2 times per day     Continuous Infusions:   sodium chloride       PRN Meds:LORazepam, traMADol, sodium chloride flush, sodium chloride, ondansetron **OR** ondansetron, acetaminophen **OR** acetaminophen, dicyclomine      Objective:      Physical Exam  Wt Readings from Last 3 Encounters:   11/26/22 272 lb 11.3 oz (123.7 kg)   07/27/21 281 lb 4.8 oz (127.6 kg)   12/11/19 (!) 315 lb 0.6 oz (142.9 kg)     Temp Readings from Last 3 Encounters:   12/05/22 98 °F (36.7 °C) (Oral)   07/27/21 97.9 °F (36.6 °C) (Skin)   06/15/21 98.8 °F (37.1 °C) (Infrared)     BP Readings from Last 3 Encounters:   12/05/22 108/64   07/27/21 133/73   09/10/19 137/67     Pulse Readings from Last 3 Encounters:   12/05/22 (!) 106 09/10/19 70   06/11/19 68    Gen: alert, awake  Neck: No JVD  Skin: Unremarkable  Cardiovascular:  S1, S2 without m/r/g   Respiratory: CTA B without w/r/r; respiratory effort normal  Abdomen:  soft, nt, nd,   Extremities: Chronic lymphedema of lower extremities  Neuro/Psy: Alert, confused         Lab Review   Lab Results   Component Value Date    WBC 10.8 11/27/2022    HGB 12.7 11/27/2022    HCT 38.6 11/27/2022    MCV 96.6 11/27/2022     11/27/2022     Lab Results   Component Value Date/Time     12/05/2022 05:26 AM    K 3.8 12/05/2022 05:26 AM    K 3.8 12/04/2022 06:26 AM     12/05/2022 05:26 AM    CO2 24 12/05/2022 05:26 AM    BUN 16 12/05/2022 05:26 AM    CREATININE 1.2 12/05/2022 05:26 AM    GLUCOSE 98 12/05/2022 05:26 AM    CALCIUM 8.6 12/05/2022 05:26 AM            Patient Active Problem List    Diagnosis Date Noted    Acute metabolic encephalopathy 06/81/9985    Complicated UTI (urinary tract infection) 11/29/2022    Dementia with agitation 11/29/2022    Constipation 11/26/2022    Elevated serum creatinine 11/26/2022    Visit for wound check 03/05/2019    Basal cell carcinoma of left eyelid 02/19/2019    SOB (shortness of breath)     Chronic diastolic congestive heart failure (HCC)     Rhabdomyolysis 02/20/2018    Cellulitis 12/01/2017    EDOUARD (acute kidney injury) ([address] 12/01/2017    CKD (chronic kidney disease), stage III ([address] 12/01/2017    Essential hypertension 12/01/2017    CHF (congestive heart failure) ([address] 12/01/2017    Leukocytosis 12/01/2017    Primary osteoarthritis of right knee 10/01/2015    Knee pain, right 10/01/2015    Intractable abdominal pain     Acute pancreatitis        ASSESSMENT AND PLAN     #EDOUARD on CKD in the setting of possible UTI and decreased p.o. intake and relative hypotension.   Patient was on torsemide, metolazone and spironolactone as well-all held now  -creatinine trending down with IV fluids, urine output remains poor but back to baseline     #Chronic kidney disease stage IIIb with baseline creatinine of 1.8 - 2.4    She had been on torsemide mon-Friday for worsening edema but this was reduced to MWF   I think she needs additional dose reduction, PRN dosing has been difficult   Based on current observation, would hold diuretics   Low urine volume but labs are stable, her intake is poor    May need gentle IV fluids if intake remains poor   Has high PVR, wants Col[place]res as straight cath is difficult; will need  evaluation    #Chronic diastolic CHF   -She also has chronic lymphedema   - Compensated and edema is the best I have ever seen it    #AMS   Infectious encephalopathy    Delirium   Worsening      #UTI   ID following   Colindres removed and having urinary retention     #HTN- BP was low, holding parameters on BP meds  -BP better today  # Hypokalemia / stable

## 2022-12-05 NOTE — CARE COORDINATION
Returned call to daughter Frank Davis with update. LM for MD to return her call with update. Followed by IM, Surgery and Nephrology. Plan is dc to [facility] when ready for discharge.     Sarah Meier RN

## 2022-12-05 NOTE — PROGRESS NOTES
Hospitalist Progress Note      PCP: Ashish Morales MD    Date of Admission: 11/26/2022    Chief Complaint:   Confusion    Hospital Course: This is a 80-year-old female with history of dementia. Patient unable to give accurate history. Per ED report patient with constipation for several days. Patient with increasing abdominal pain. Patient was mentating at baseline upon admission. Patient was also being treated for UTI. Patient has had decreased appetite with nausea. No emesis reported. Patient's mental status was improved this morning but worsened later in the morning. Patient was disorientated and paranoid. Subjective:       Pt having urinary retention. S/p straight cath yesterday. Mentation improving, more awake and conversant per daughter at bedside.  Pt reports bilat leg pain      Medications:  Reviewed    Infusion Medications    sodium chloride       Scheduled Medications    amoxicillin  500 mg Oral 3 times per day    labetalol  50 mg Oral BID    hydrALAZINE  50 mg Oral TID    allopurinol  100 mg Oral Daily    aspirin  81 mg Oral Daily    atorvastatin  40 mg Oral Nightly    bisacodyl  5 mg Oral Nightly    citalopram  10 mg Oral Daily    doxazosin  1 mg Oral Nightly    DULoxetine  30 mg Oral Daily    guanFACINE  1 mg Oral QPM    isosorbide mononitrate  30 mg Oral Daily    nystatin   Topical BID    rivaroxaban  10 mg Oral Dinner    sodium chloride flush  5-40 mL IntraVENous 2 times per day     PRN Meds: LORazepam, traMADol, sodium chloride flush, sodium chloride, ondansetron **OR** ondansetron, acetaminophen **OR** acetaminophen, dicyclomine      Intake/Output Summary (Last 24 hours) at 12/5/2022 1115  Last data filed at 12/5/2022 0930  Gross per 24 hour   Intake 720 ml   Output 60 ml   Net 660 ml         Physical Exam Performed:    /71   Pulse (!) 105   Temp 97.5 °F (36.4 °C) (Oral)   Resp 18   Ht 5' 5\" (1.651 m)   Wt 272 lb 11.3 oz (123.7 kg)   SpO2 95%   BMI 45.38 kg/m² General appearance: No apparent distress, appears stated age and cooperative. HEENT: Pupils equal, round, . Conjunctivae/corneas clear. Neck: Supple, with full range of motion. No jugular venous distention. Trachea midline. Respiratory:  Normal respiratory effort. Clear to auscultation, bilaterally without Rales/Wheezes/Rhonchi. Cardiovascular: Regular rate and rhythm with normal S1/S2 without murmurs, rubs or gallops. Abdomen: Soft, non-tender, non-distended with normal bowel sounds. Musculoskeletal: No clubbing, cyanosis . Mild bilat pedal edema. Bilat calf tenderness   Skin: warm and dry   Neurologic:  Neurovascularly intact without any focal sensory/motor deficits. Cranial nerves: II-XII intact, grossly non-focal.  Psychiatric: Alert, mild confusion, limited insight        Labs:   No results for input(s): WBC, HGB, HCT, PLT in the last 72 hours. Recent Labs     12/03/22  0530 12/04/22  0626 12/05/22  0526    138 139   K 3.6 3.8  3.8 3.8    109 108   CO2 22 21 24   BUN 19 17 16   CREATININE 1.4* 1.2 1.2   CALCIUM 8.5 8.8 8.6   PHOS  --  3.0 3.0       No results for input(s): AST, ALT, BILIDIR, BILITOT, ALKPHOS in the last 72 hours. No results for input(s): INR in the last 72 hours. No results for input(s): [place] in the last 72 hours. Urinalysis:      Lab Results   Component Value Date/Time    NITRU Negative 11/26/2022 08:34 PM    WBCUA >100 11/26/2022 08:34 PM    BACTERIA 2+ 11/26/2022 08:34 PM    RBCUA 3-4 11/26/2022 08:34 PM    BLOODU TRACE-INTACT 11/26/2022 08:34 PM    SPECGRAV 1.020 11/26/2022 08:34 PM    GLUCOSEU Negative 11/26/2022 08:34 PM       Radiology:  CT ABDOMEN PELVIS WO CONTRAST Additional Contrast? None   Final Result   1. Constipation with large amount of stool within the sigmoid colon and   rectum. Otherwise, no acute intra-abdominal or pelvic abnormality with   limitations for a noncontrast CT scan.    2. Diverticulosis without obvious inflammation. IP CONSULT TO NEPHROLOGY  IP CONSULT TO INFECTIOUS DISEASES  IP CONSULT TO PALLIATIVE CARE    Assessment/Plan:    Active Hospital Problems    Diagnosis     Acute metabolic encephalopathy [P96.67]      Priority: Medium    Complicated UTI (urinary tract infection) [N39.0]      Priority: Medium    Dementia with agitation [F03.911]      Priority: Medium    Constipation [K59.00]      Priority: Medium    Elevated serum creatinine [R79.89]      Priority: Medium     Acute Metabolic encephalopathy with underlying dementia. Likely 2/2 UTI. Continue Abx per ID. Uses PRN Valium at [facility]. Will continue PRN Ativan for now. UTI: Abnormal UA. UCx showed Enterococcus and Pseudomonas. Significant pyuria on UA. ID assisted. To complete abx course with amoxil and cipro today. Chronic anticoagulation. Continue Xarelto. Hypotension. Likely 2/2 above. Improved. HTN, controlled. Regimen/diuretics adjusted d/t infection, hypotension. Monitor. EDOUARD on CKD. Baseline creatinine around 1.5-1.8. s/p  IVF hydration. Improving . Nephro assisting       Urinary retention: had thao which was removed and is on cardura. Now recurrent after thao removal. S/p straight . Replace thao. Add  flomax. Consult urology        Bilat calf tenderness : doppler US to r/o acute DVT     DVT Prophylaxis: Xarelto  Diet: ADULT ORAL NUTRITION SUPPLEMENT; Breakfast, Dinner; Fortified Pudding Oral Supplement  ADULT ORAL NUTRITION SUPPLEMENT; Lunch, Dinner; Frozen Oral Supplement  ADULT DIET; Regular; No Added Salt (3-4 gm); 1500 ml  Code Status: Limited  PT/OT Eval Status: SNF    Dispo - pending urology eval for recurrent urinary retention, clinical course.  ? 1-2 days to SNF       Appropriate for A1 Discharge Unit: Khushi Pierce MD

## 2022-12-05 NOTE — PROGRESS NOTES
Occupational Therapy  Facility/Department: [facility] C3 TELE/MED SURG/ONC  Daily Treatment Note  NAME: Cristo Roberson  : 3/16/1930  MRN: [id number]    Date of Service: 2022    Discharge Recommendations:  Subacute/Skilled Nursing Facility  OT Equipment Recommendations  Other: Defer to facility to obtain    Cristo Roberson scored a 10/24 on the AM-PAC ADL Inpatient form. Current research shows that an AM-PAC score of 17 or less is typically not associated with a discharge to the patient's home setting. Based on the patient's AM-PAC score and their current ADL deficits, it is recommended that the patient have 3-5 sessions per week of Occupational Therapy at d/c to increase the patient's independence. Please see assessment section for further patient specific details. If patient discharges prior to next session this note will serve as a discharge summary. Please see below for the latest assessment towards goals. Patient Diagnosis(es): The primary encounter diagnosis was Constipation, unspecified constipation type. A diagnosis of EDOUARD (acute kidney injury) ([facility] [address] was also pertinent to this visit. Assessment    Assessment: Pt seen with PT as co-tx secondary to complexity of condition, decreased cognition, balance and safety. Pt benefits from x 2 skilled hands to provide increased cues and feedback with mobility and improve safety and IND. Pt requires maxA x 2 for bed mobility. Pt tolerates sitting EOB ~ 5-6  minutes with grossly CGA to MOD for sitting due to back pain. Pt required up to SBA for oral care and max A for UB dressing. Pt will benefit from continued skilled OT in acute care setting to address above deficits. Activity Tolerance: Patient limited by fatigue;Treatment limited secondary to decreased cognition;Patient limited by endurance; Patient limited by pain  Discharge Recommendations: Subacute/Skilled Nursing Facility  Other: Defer to facility to obtain      Plan   Occupational Therapy implement solutions;Assistance required to correct errors made  Insights: Not aware of deficits  Initiation: Requires cues for some  Sequencing: Requires cues for some        Objective         Bed Mobility Training  Bed Mobility Training: Yes  Overall Level of Assistance: Assist X2;Maximum assistance  Interventions: Tactile cues; Verbal cues;Manual cues  Rolling: Maximum assistance;Assist X2  Supine to Sit: Maximum assistance;Assist X2  Sit to Supine: Assist X2;Maximum assistance  Scooting: Assist X2;Total assistance (up to [facility])  Balance  Sitting: Impaired  Sitting - Static: Poor (constant support) (Pt sat EOB ~8 minutes. Grossly CGA. At times requiring total A to lean back d/t low back pain. PT facilitating balance/posture and OT facilitating ADLs.)  Sitting - Dynamic: Poor (constant support)  Transfer Training  Transfer Training: No (unsafe this date dt decreased sitting balance/tolerance)     ADL  Grooming: Stand by assistance;Verbal cueing;Setup  Grooming Skilled Clinical Factors: oral care sitting side of bed  UE Dressing: Maximum assistance  UE Dressing Skilled Clinical Factors: gown exchange        Safety Devices  Type of Devices: Patient at risk for falls; All mariah prominences offloaded; All fall risk precautions in place; Bed alarm in place;Call light within reach; Heels elevated for pressure relief;Nurse notified;Telesitter in use;Left in bed     Patient Education  Education Given To: Patient  Education Provided: Home Exercise Program;Orientation;Plan of Care;Energy Conservation;Role of Therapy; ADL Adaptive Strategies  Education Provided Comments: role of OT, importance of exercise/AROM, orientation  Education Method: Verbal  Barriers to Learning: Cognition;Vision  Education Outcome: Continued education needed    Goals  Short Term Goals  Time Frame for Short Term Goals: 1 week (12/6) unless otherwise noted--All Goals Ongoing 12/1/22  Short Term Goal 1: Pt will complete sup>sit transfer w/ modAx2 by 12/4  Short Term Goal 2: Pt will complete bed>chair transfer w/ modAx2 and LRAD  Short Term Goal 3: Pt will complete seated grooming task w/ SBA and set-up  Short Term Goal 4: Pt will complete toileting w/ Gladis and LRAD  Patient Goals   Patient goals : pt unable to state       Therapy Time   Individual Concurrent Group Co-treatment   Time In       5504   Time Out       0901   Minutes       24   Timed Code Treatment Minutes: 24 Minutes       Kamaljit Barber OTR/L

## 2022-12-05 NOTE — CONSULTS
Consult placed    Who:Dr. Don Seth  Date:12/5/2022,  Time:12:18 PM        Electronically signed by Zane Rapp on 12/5/2022 at 12:18 PM

## 2022-12-05 NOTE — PLAN OF CARE
Problem: Safety - Adult  Goal: Free from fall injury  Outcome: Progressing  Flowsheets (Taken 12/5/2022 1036)  Free From Fall Injury: Instruct family/caregiver on patient safety     Problem: Skin/Tissue Integrity  Goal: Absence of new skin breakdown  Description: 1. Monitor for areas of redness and/or skin breakdown  2. Assess vascular access sites hourly  3. Every 4-6 hours minimum:  Change oxygen saturation probe site  4. Every 4-6 hours:  If on nasal continuous positive airway pressure, respiratory therapy assess nares and determine need for appliance change or resting period.   Outcome: Progressing     Problem: Pain  Goal: Verbalizes/displays adequate comfort level or baseline comfort level  Outcome: Progressing  Flowsheets (Taken 12/5/2022 1036)  Verbalizes/displays adequate comfort level or baseline comfort level:   Encourage patient to monitor pain and request assistance   Assess pain using appropriate pain scale   Administer analgesics based on type and severity of pain and evaluate response

## 2022-12-06 PROCEDURE — 1200000000 HC SEMI PRIVATE

## 2022-12-06 PROCEDURE — 6370000000 HC RX 637 (ALT 250 FOR IP): Performed by: INTERNAL MEDICINE

## 2022-12-06 PROCEDURE — 6370000000 HC RX 637 (ALT 250 FOR IP): Performed by: NURSE PRACTITIONER

## 2022-12-06 PROCEDURE — 2580000003 HC RX 258: Performed by: INTERNAL MEDICINE

## 2022-12-06 RX ORDER — 0.9 % SODIUM CHLORIDE 0.9 %
500 INTRAVENOUS SOLUTION INTRAVENOUS ONCE
Status: COMPLETED | OUTPATIENT
Start: 2022-12-06 | End: 2022-12-06

## 2022-12-06 RX ADMIN — LABETALOL HYDROCHLORIDE 50 MG: 100 TABLET, FILM COATED ORAL at 22:55

## 2022-12-06 RX ADMIN — LABETALOL HYDROCHLORIDE 50 MG: 100 TABLET, FILM COATED ORAL at 08:42

## 2022-12-06 RX ADMIN — ASPIRIN 81 MG 81 MG: 81 TABLET ORAL at 08:42

## 2022-12-06 RX ADMIN — NYSTATIN: 100000 CREAM TOPICAL at 08:43

## 2022-12-06 RX ADMIN — DULOXETINE HYDROCHLORIDE 30 MG: 30 CAPSULE, DELAYED RELEASE ORAL at 08:43

## 2022-12-06 RX ADMIN — CITALOPRAM HYDROBROMIDE 10 MG: 20 TABLET ORAL at 08:42

## 2022-12-06 RX ADMIN — BISACODYL 5 MG: 5 TABLET, COATED ORAL at 22:54

## 2022-12-06 RX ADMIN — HYDRALAZINE HYDROCHLORIDE 50 MG: 50 TABLET, FILM COATED ORAL at 08:42

## 2022-12-06 RX ADMIN — SODIUM CHLORIDE 500 ML: 9 INJECTION, SOLUTION INTRAVENOUS at 18:13

## 2022-12-06 RX ADMIN — ALLOPURINOL 100 MG: 100 TABLET ORAL at 08:42

## 2022-12-06 RX ADMIN — DOXAZOSIN 1 MG: 2 TABLET ORAL at 22:54

## 2022-12-06 RX ADMIN — RIVAROXABAN 10 MG: 10 TABLET, FILM COATED ORAL at 18:14

## 2022-12-06 RX ADMIN — ATORVASTATIN CALCIUM 40 MG: 40 TABLET, FILM COATED ORAL at 22:53

## 2022-12-06 RX ADMIN — NYSTATIN: 100000 CREAM TOPICAL at 22:56

## 2022-12-06 RX ADMIN — ISOSORBIDE MONONITRATE 30 MG: 30 TABLET, EXTENDED RELEASE ORAL at 08:42

## 2022-12-06 NOTE — PROGRESS NOTES
Assessment complete and charted. PM NP notified of low UOP with thao in place. No new orders currently. Call light within reach, will continue to monitor.

## 2022-12-06 NOTE — PROGRESS NOTES
[address] [employer]  Nephrology Progress Note           CC: EDOUARD on CKD    HPI as of 11/27  80 y.o. yo female with PMH of chronic kidney disease stage III, hypertension, CHF with preserved EF, dementia, chronic lymphedema who is admitted for abdominal pain palpitation and fall  Patient was brought from nursing care facility to emergency room due to abdominal pain and not having bowel movement for 3 days. Patient also had palpitations at the nursing home.         SUBJECTIVE/ROS  High PVR, urology consulted,holding the Flomax, they recommended replacing the [place] if PVR>400 ( it was >500 on 12/4)    Eating better, no N/V      SOC: no visitors      Scheduled Meds:   [Held by provider] tamsulosin  0.4 mg Oral Daily    labetalol  50 mg Oral BID    hydrALAZINE  50 mg Oral TID    allopurinol  100 mg Oral Daily    aspirin  81 mg Oral Daily    atorvastatin  40 mg Oral Nightly    bisacodyl  5 mg Oral Nightly    citalopram  10 mg Oral Daily    doxazosin  1 mg Oral Nightly    DULoxetine  30 mg Oral Daily    guanFACINE  1 mg Oral QPM    isosorbide mononitrate  30 mg Oral Daily    nystatin   Topical BID    rivaroxaban  10 mg Oral Dinner    sodium chloride flush  5-40 mL IntraVENous 2 times per day     Continuous Infusions:   sodium chloride       PRN Meds:LORazepam, traMADol, sodium chloride flush, sodium chloride, ondansetron **OR** ondansetron, acetaminophen **OR** acetaminophen, dicyclomine      Objective:      Physical Exam  Wt Readings from Last 3 Encounters:   12/06/22 258 lb 4.8 oz (117.2 kg)   07/27/21 281 lb 4.8 oz (127.6 kg)   12/11/19 (!) 315 lb 0.6 oz (142.9 kg)     Temp Readings from Last 3 Encounters:   12/06/22 97.7 °F (36.5 °C) (Oral)   07/27/21 97.9 °F (36.6 °C) (Skin)   06/15/21 98.8 °F (37.1 °C) (Infrared)     BP Readings from Last 3 Encounters:   12/06/22 113/75   07/27/21 133/73   09/10/19 137/67     Pulse Readings from Last 3 Encounters:   12/06/22 (!) 116   09/10/19 70   06/11/19 68    Gen: alert, awake  Neck: No JVD  Skin: Unremarkable  Cardiovascular:  S1, S2 without m/r/g   Respiratory: CTA B without w/r/r; respiratory effort normal  Abdomen:  soft, nt, nd,   Extremities: Chronic lymphedema of lower extremities  Neuro/Psy: Alert, confused         Lab Review   Lab Results   Component Value Date    WBC 10.8 11/27/2022    HGB 12.7 11/27/2022    HCT 38.6 11/27/2022    MCV 96.6 11/27/2022     11/27/2022     Lab Results   Component Value Date/Time     12/05/2022 05:26 AM    K 3.8 12/05/2022 05:26 AM    K 3.8 12/04/2022 06:26 AM     12/05/2022 05:26 AM    CO2 24 12/05/2022 05:26 AM    BUN 16 12/05/2022 05:26 AM    CREATININE 1.2 12/05/2022 05:26 AM    GLUCOSE 98 12/05/2022 05:26 AM    CALCIUM 8.6 12/05/2022 05:26 AM            Patient Active Problem List    Diagnosis Date Noted    Acute metabolic encephalopathy 13/19/4137    Complicated UTI (urinary tract infection) 11/29/2022    Dementia with agitation 11/29/2022    Constipation 11/26/2022    Elevated serum creatinine 11/26/2022    Visit for wound check 03/05/2019    Basal cell carcinoma of left eyelid 02/19/2019    SOB (shortness of breath)     Chronic diastolic congestive heart failure (HCC)     Rhabdomyolysis 02/20/2018    Cellulitis 12/01/2017    EDOUARD (acute kidney injury) ([address] 12/01/2017    CKD (chronic kidney disease), stage III ([address] 12/01/2017    Essential hypertension 12/01/2017    CHF (congestive heart failure) ([facility] [address] 12/01/2017    Leukocytosis 12/01/2017    Primary osteoarthritis of right knee 10/01/2015    Knee pain, right 10/01/2015    Intractable abdominal pain     Acute pancreatitis        ASSESSMENT AND PLAN     #EDOUARD on CKD in the setting of possible UTI and decreased p.o. intake and relative hypotension.   Patient was on torsemide, metolazone and spironolactone as well-all held now  -creatinine trending down with IV fluids    #Chronic kidney disease stage IIIb with baseline creatinine of 1.8 - 2.4   -now at 1.2  -holding the diuretics      #Chronic diastolic CHF   -She also has chronic lymphedema   - Compensated and edema is the best per dr Ny Shine    #AMS   Infectious encephalopathy         #UTI   ID following    #HTN- BP was low, holding parameters on BP meds    # Hypokalemia / stable

## 2022-12-06 NOTE — CARE COORDINATION
Cm following. Plan for dc to the [facility] when medically ready. Urology does not want pt to dc with thao.

## 2022-12-06 NOTE — PROGRESS NOTES
stated age and cooperative. HEENT: Pupils equal, round, . Conjunctivae/corneas clear. Neck: Supple, with full range of motion. No jugular venous distention. Trachea midline. Respiratory:  Normal respiratory effort. Clear to auscultation, bilaterally without Rales/Wheezes/Rhonchi. Cardiovascular: Regular rate and rhythm with normal S1/S2 without murmurs, rubs or gallops. Abdomen: Soft, non-tender, non-distended with normal bowel sounds. Musculoskeletal: No clubbing, cyanosis . Mild bilat pedal edema. Bilat calf tenderness   Skin: warm and dry   Neurologic:  Neurovascularly intact without any focal sensory/motor deficits. Cranial nerves: II-XII intact, grossly non-focal.  Psychiatric: Alert, mild confusion, limited insight        Labs:   No results for input(s): WBC, HGB, HCT, PLT in the last 72 hours. Recent Labs     12/04/22  0626 12/05/22  0526    139   K 3.8  3.8 3.8    108   CO2 21 24   BUN 17 16   CREATININE 1.2 1.2   CALCIUM 8.8 8.6   PHOS 3.0 3.0       No results for input(s): AST, ALT, BILIDIR, BILITOT, ALKPHOS in the last 72 hours. No results for input(s): INR in the last 72 hours. No results for input(s): [place] in the last 72 hours. Urinalysis:      Lab Results   Component Value Date/Time    NITRU Negative 11/26/2022 08:34 PM    WBCUA >100 11/26/2022 08:34 PM    BACTERIA 2+ 11/26/2022 08:34 PM    RBCUA 3-4 11/26/2022 08:34 PM    BLOODU TRACE-INTACT 11/26/2022 08:34 PM    SPECGRAV 1.020 11/26/2022 08:34 PM    GLUCOSEU Negative 11/26/2022 08:34 PM       Radiology:  VL Extremity Venous Bilateral         CT ABDOMEN PELVIS WO CONTRAST Additional Contrast? None   Final Result   1. Constipation with large amount of stool within the sigmoid colon and   rectum. Otherwise, no acute intra-abdominal or pelvic abnormality with   limitations for a noncontrast CT scan. 2. Diverticulosis without obvious inflammation.              IP CONSULT TO NEPHROLOGY  IP CONSULT TO INFECTIOUS DISEASES  IP CONSULT TO PALLIATIVE CARE  IP CONSULT TO UROLOGY    Assessment/Plan:    Active Hospital Problems    Diagnosis     Acute metabolic encephalopathy [M67.04]      Priority: Medium    Complicated UTI (urinary tract infection) [N39.0]      Priority: Medium    Dementia with agitation [F03.911]      Priority: Medium    Constipation [K59.00]      Priority: Medium    Elevated serum creatinine [R79.89]      Priority: Medium     Acute Metabolic encephalopathy with underlying dementia. Likely 2/2 UTI. Completed course of abx per ID recs. Uses PRN Valium at [facility]. Will continue PRN Ativan for now. UTI: Abnormal UA. UCx showed Enterococcus and Pseudomonas. Significant pyuria on UA. ID assisted. Completed abx course with amoxil and cipro  on 12/5      Chronic anticoagulation. Continue Xarelto. HTN: Regimen/diuretics adjusted d/t infection, hypotension. Monitor. EDOUARD on CKD. Baseline creatinine around 1.5-1.8. s/p  IVF hydration. Improving . Nephro assisting       Urinary retention: had thao which was removed and is on cardura. Was recurrent after thao removal. Flomax started and thao replaced. Urology consulted- recs appreciated. Flomax d/shereen. Possible voiding trial tomorrow     Bilat calf tenderness : doppler US neg for DVT     DVT Prophylaxis: Xarelto  Diet: ADULT ORAL NUTRITION SUPPLEMENT; Breakfast, Dinner; Fortified Pudding Oral Supplement  ADULT ORAL NUTRITION SUPPLEMENT; Lunch, Dinner; Frozen Oral Supplement  ADULT DIET; Regular; No Added Salt (3-4 gm); 1500 ml  Code Status: Limited  PT/OT Eval Status: SNF    Dispo - possibly 1-2 days pending clinical course and specialists' recs. Appropriate for A1 Discharge Unit: Khushi Lewis MD     Was later called by RN, pt's  BP low in 90's and tachycardic after getting her BP meds today. IVF bolus ordered , to  old BP meds based on hold parameters in place. Monitor BP and can give additional bolus if needed.

## 2022-12-06 NOTE — PROGRESS NOTES
4 Eyes Skin Assessment     The patient is being assess for  Low mary    I agree that 2 RN's have performed a thorough Head to Toe Skin Assessment on the patient. ALL assessment sites listed below have been assessed. Areas assessed by both nurses: Luz Betancourt RN  [x]   Head, Face, and Ears   [x]   Shoulders, Back, and Chest  [x]   Arms, Elbows, and Hands   [x]   Coccyx, Sacrum, and Ischum  [x]   Legs, Feet, and Heels   Blanchable redness sacrum     Does the Patient have Skin Breakdown? Yes, R buttock stage II charted, skin tears, and redness to folds.          Mary Prevention initiated:  Yes   Wound Care Orders initiated:  Yes      [id number] nurse consulted for Pressure Injury (Stage 3,4, Unstageable, DTI, NWPT, and Complex wounds):  No      Nurse 1 eSignature: Electronically signed by Melissa Obrien RN on 12/5/22 at 8:35 PM EST    **SHARE this note so that the co-signing nurse is able to place an eSignature**    Nurse 2 eSignature: Electronically signed by Gabbie Erickson RN on 12/6/22 at 12:41 AM EST

## 2022-12-06 NOTE — CONSULTS
Urology Consult Note      Reason for Consultation: urinary retention    Chief Complaint: \"I don;t have trouble urinating\"  HPI:  Aleksey Calhoun is a 80 y.o. female who is admitted with AMS, UTI and has been seen by IM and nephrology. She had culture + UTI and is on abx therapy. Over the past few days she has had -300 and subsequently had thao placed on 12/5/22.   Patient is bedbound and voids into a diaper     Past Medical History:   Diagnosis Date    Arthritis     Basal cell carcinoma of left eyelid 02/19/2019    Bladder leak     Cellulitis     Chronic back pain     Chronic diastolic CHF (congestive heart failure) ([facility] [address]     see [place] heart notes, follows [facility] NP    CKD (chronic kidney disease)     Dementia ([facility] [address]     Depression     HTN (hypertension) 12/01/2017    Tinnitus     Wears glasses        Past Surgical History:   Procedure Laterality Date    APPENDECTOMY      BLADDER SUSPENSION  2005    BREAST SURGERY      cyst left breast    CATARACT REMOVAL      both eyes    CHOLECYSTECTOMY      HEEL SPUR SURGERY      left heel and cyst from top of foot    MOHS SURGERY  02/19/2019    L medial lower eyelid       Medication List reviewed:      Current Facility-Administered Medications   Medication Dose Route Frequency Provider Last Rate Last Admin    [Held by provider] tamsulosin (FLOMAX) capsule 0.4 mg  0.4 mg Oral Daily Eugenio Lewis MD   0.4 mg at 12/05/22 1356    LORazepam (ATIVAN) tablet 0.5 mg  0.5 mg Oral Q4H PRN Irwin Evans MD   0.5 mg at 12/03/22 1649    traMADol (ULTRAM) tablet 50 mg  50 mg Oral Q6H PRN Irwin Evans MD   50 mg at 12/05/22 1716    labetalol (NORMODYNE) tablet 50 mg  50 mg Oral BID Carey Weston MD   50 mg at 12/06/22 4137    hydrALAZINE (APRESOLINE) tablet 50 mg  50 mg Oral TID Carey Weston MD   50 mg at 12/06/22 0842    allopurinol (ZYLOPRIM) tablet 100 mg  100 mg Oral Daily LEYLA Khan - CNP   100 mg at 12/06/22 0630    aspirin chewable tablet 81 mg  81 mg Oral Daily [facility], [facility] - CNP   81 mg at 12/06/22 0842    atorvastatin (LIPITOR) tablet 40 mg  40 mg Oral Nightly [facility], APRN - CNP   40 mg at 12/05/22 2048    bisacodyl (DULCOLAX) EC tablet 5 mg  5 mg Oral Nightly Chiquita Schirmer, APRN - CNP   5 mg at 12/05/22 2054    citalopram (CELEXA) tablet 10 mg  10 mg Oral Daily Chiquita Schirmer, APRN - CNP   10 mg at 12/06/22 8916    doxazosin (CARDURA) tablet 1 mg  1 mg Oral Nightly Joselito Flores MD   1 mg at 12/05/22 2047    DULoxetine (CYMBALTA) extended release capsule 30 mg  30 mg Oral Daily Chiquita Schirmer, APRN - CNP   30 mg at 12/06/22 0843    guanFACINE (TENEX) tablet 1 mg  1 mg Oral QPM Chiquita Schirmer, APRN - CNP   1 mg at 12/05/22 1721    isosorbide mononitrate (IMDUR) extended release tablet 30 mg  30 mg Oral Daily Chiquita Schirmer, APRN - CNP   30 mg at 12/06/22 3102    nystatin (MYCOSTATIN) cream   Topical BID [facility], APR[facility] - CNP   Given at 12/06/22 5288    rivaroxaban (XARELTO) tablet 10 mg  10 mg Oral Dinner Chiquita Schirmer, APRN - CNP   10 mg at 12/05/22 1721    sodium chloride flush 0.9 % injection 5-40 mL  5-40 mL IntraVENous 2 times per day [facility], APRN - CNP   10 mL at 12/03/22 1050    sodium chloride flush 0.9 % injection 5-40 mL  5-40 mL IntraVENous PRN [facility], APRN - CNP   10 mL at 11/28/22 0127    0.9 % sodium chloride infusion   IntraVENous PRN Chiquita Schirmer, APRN - CNP        ondansetron (ZOFRAN-ODT) disintegrating tablet 4 mg  4 mg Oral Q8H PRN [facility], APRN - CNP        Or    ondansetron (ZOFRAN) injection 4 mg  4 mg IntraVENous Q6H PRN [facility], [facility] - CNP        acetaminophen (TYLENOL) tablet 650 mg  650 mg Oral Q6H PRN [facility], APR[facility] - CNP   650 mg at 12/02/22 1400    Or    acetaminophen (TYLENOL) suppository 650 mg  650 mg Rectal Q6H PRN Dara Schirmer, APRN - CNP        dicyclomine (BENTYL) tablet 20 mg  20 mg Oral TID PRN [facility], APRN - CNP 20 mg at 22 5816       Allergies   Allergen Reactions    Bee Venom Swelling    Nutritional Supplements Swelling    Oxycodone     Codeine Nausea And Vomiting     Severe B/P drop    Hydrocodone-Acetaminophen Nausea And Vomiting    Morphine Nausea And Vomiting     Severe drop in B/P    Percocet [Oxycodone-Acetaminophen] Nausea And Vomiting       Family History   Family history unknown: Yes       Social History     Tobacco Use    Smoking status: Never    Smokeless tobacco: Never   Vaping Use    Vaping Use: Never used   Substance Use Topics    Alcohol use: No    Drug use: No         Review of Systems: A 12 point ROS was performed and was unremarkable unless listed in the history of present illness. I/O last 3 completed shifts: In: 1800 [P.O.:1800]  Out: 760 [Urine:760]    Vitals:  /75   Pulse (!) 116   Temp 97.7 °F (36.5 °C) (Oral)   Resp 16   Ht 5' 5\" (1.651 m)   Wt 258 lb 4.8 oz (117.2 kg)   SpO2 96%   BMI 42.98 kg/m²   Temp  Av.6 °F (36.4 °C)  Min: 97.4 °F (36.3 °C)  Max: 97.9 °F (36.6 °C)    Intake/Output Summary (Last 24 hours) at 2022 1202  Last data filed at 2022 0851  Gross per 24 hour   Intake 1260 ml   Output 775 ml   Net 485 ml         Physical:  Well developed, well nourished in no acute distress  Mood indicates no abnormalities.  Pt doesnt appear depressed  Orientated to time and place  Abd is soft and obese  Colindres draining yellow urine     Labs:  WBC:    Lab Results   Component Value Date/Time    WBC 10.8 2022 02:33 PM     Hemoglobin/Hematocrit:    Lab Results   Component Value Date/Time    HGB 12.7 2022 02:33 PM    HCT 38.6 2022 02:33 PM     BMP:    Lab Results   Component Value Date/Time     2022 05:26 AM    K 3.8 2022 05:26 AM    K 3.8 2022 06:26 AM     2022 05:26 AM    CO2 24 2022 05:26 AM    BUN 16 2022 05:26 AM    LABALBU 2.8 2022 05:26 AM    CREATININE 1.2 2022 05:26 AM    CALCIUM 8.6 12/05/2022 05:26 AM    GFRAA 43 02/24/2018 08:37 AM    GFRAA 55 02/16/2013 10:33 AM    LABGLOM 42 12/05/2022 05:26 AM     PT/INR:    Lab Results   Component Value Date/Time    PROTIME 13.5 12/01/2017 10:36 AM    INR 1.19 12/01/2017 10:36 AM     PTT:    Lab Results   Component Value Date/Time    APTT 23.0 02/21/2018 06:17 AM   [APTT    Urinalysis:    Latest Reference Range & Units 11/26/22 20:34   Color, UA Straw/Yellow  Yellow   Clarity, UA Clear  CLOUDY ! Glucose, UA Negative mg/dL Negative   Bilirubin, Urine Negative  Negative   Ketones, Urine Negative mg/dL Negative   Specific Gravity, UA 1.005 - 1.030  1.020   Blood, Urine Negative  TRACE-INTACT !   pH, UA 5.0 - 8.0  5.5   Protein, UA Negative mg/dL Negative   Urobilinogen, Urine <2.0 E.U./dL 0.2   Nitrite, Urine Negative  Negative   Leukocyte Esterase, Urine Negative  LARGE ! Urine Type  NotGiven   Urine Reflex to Culture  Yes   Hyaline Casts, UA 0 - 2 /LPF 3-5 ! Mucus, UA None Seen /LPF 1+ ! WBC, UA 0 - 5 /HPF >100 ! RBC, UA 0 - 4 /HPF 3-4   Epithelial Cells, UA 0 - 5 /HPF 0-1   Bacteria, UA None Seen /HPF 2+ ! Microscopic Examination  YES   !: Data is abnormal  Urine Culture: Enterococcus faecalis and Psuedomonas aeruginosa      Antibiotic Therapy:  completed therapy     Imaging:   CT 11/26  1. Constipation with large amount of stool within the sigmoid colon and   rectum. Otherwise, no acute intra-abdominal or pelvic abnormality with   limitations for a noncontrast CT scan. Impression/Plan:   Urinary retention  - PVR between 200-300 when thao placed  - urine output low, I flushed thao and it appears to be in correct position  - UTI has been treated  - treat constipation (multiple BMs documented)  - get to up to commode to void if able   - avoid use of two alpha blockers, I placed a hold order on the flomax   - would remove thao tomorrow AM and monitor for voiding.   Would not replace unless significantly elevated PVRs consistently >400    Horacio Miles PA-C  12/6/2022

## 2022-12-07 VITALS
SYSTOLIC BLOOD PRESSURE: 123 MMHG | WEIGHT: 258.3 LBS | BODY MASS INDEX: 43.03 KG/M2 | RESPIRATION RATE: 16 BRPM | OXYGEN SATURATION: 97 % | HEIGHT: 65 IN | DIASTOLIC BLOOD PRESSURE: 72 MMHG | HEART RATE: 110 BPM | TEMPERATURE: 97.7 F

## 2022-12-07 LAB — SARS-COV-2, NAAT: NOT DETECTED

## 2022-12-07 PROCEDURE — 87635 SARS-COV-2 COVID-19 AMP PRB: CPT

## 2022-12-07 PROCEDURE — 6370000000 HC RX 637 (ALT 250 FOR IP): Performed by: NURSE PRACTITIONER

## 2022-12-07 PROCEDURE — 6370000000 HC RX 637 (ALT 250 FOR IP): Performed by: INTERNAL MEDICINE

## 2022-12-07 PROCEDURE — 2580000003 HC RX 258: Performed by: NURSE PRACTITIONER

## 2022-12-07 RX ORDER — TORSEMIDE 20 MG/1
20 TABLET ORAL
Qty: 30 TABLET | Refills: 3 | Status: SHIPPED | OUTPATIENT
Start: 2022-12-08

## 2022-12-07 RX ORDER — LABETALOL 100 MG/1
50 TABLET, FILM COATED ORAL 2 TIMES DAILY
Qty: 60 TABLET | Refills: 3
Start: 2022-12-07

## 2022-12-07 RX ORDER — DIAZEPAM 2 MG/1
2 TABLET ORAL 2 TIMES DAILY PRN
Qty: 6 TABLET | Refills: 0 | Status: SHIPPED | OUTPATIENT
Start: 2022-12-07 | End: 2022-12-10

## 2022-12-07 RX ORDER — HYDRALAZINE HYDROCHLORIDE 50 MG/1
50 TABLET, FILM COATED ORAL 3 TIMES DAILY
Qty: 90 TABLET | Refills: 3
Start: 2022-12-07

## 2022-12-07 RX ORDER — TRAMADOL HYDROCHLORIDE 50 MG/1
50 TABLET ORAL EVERY 6 HOURS PRN
Qty: 10 TABLET | Refills: 0 | Status: SHIPPED | OUTPATIENT
Start: 2022-12-07 | End: 2022-12-10

## 2022-12-07 RX ORDER — TORSEMIDE 20 MG/1
20 TABLET ORAL
Qty: 30 TABLET | Refills: 3 | Status: CANCELLED | OUTPATIENT
Start: 2022-12-08

## 2022-12-07 RX ORDER — DICYCLOMINE HCL 20 MG
20 TABLET ORAL 3 TIMES DAILY PRN
Qty: 120 TABLET | Refills: 3
Start: 2022-12-07

## 2022-12-07 RX ADMIN — ASPIRIN 81 MG 81 MG: 81 TABLET ORAL at 08:46

## 2022-12-07 RX ADMIN — LABETALOL HYDROCHLORIDE 50 MG: 100 TABLET, FILM COATED ORAL at 08:47

## 2022-12-07 RX ADMIN — ALLOPURINOL 100 MG: 100 TABLET ORAL at 08:47

## 2022-12-07 RX ADMIN — CITALOPRAM HYDROBROMIDE 10 MG: 20 TABLET ORAL at 08:46

## 2022-12-07 RX ADMIN — HYDRALAZINE HYDROCHLORIDE 50 MG: 50 TABLET, FILM COATED ORAL at 08:47

## 2022-12-07 RX ADMIN — HYDRALAZINE HYDROCHLORIDE 50 MG: 50 TABLET, FILM COATED ORAL at 14:30

## 2022-12-07 RX ADMIN — SODIUM CHLORIDE, PRESERVATIVE FREE 5 ML: 5 INJECTION INTRAVENOUS at 10:53

## 2022-12-07 RX ADMIN — DULOXETINE HYDROCHLORIDE 30 MG: 30 CAPSULE, DELAYED RELEASE ORAL at 08:46

## 2022-12-07 RX ADMIN — NYSTATIN: 100000 CREAM TOPICAL at 08:49

## 2022-12-07 RX ADMIN — ISOSORBIDE MONONITRATE 30 MG: 30 TABLET, EXTENDED RELEASE ORAL at 08:46

## 2022-12-07 NOTE — PROGRESS NOTES
Pt a/o x4. Shift assessment completed and charted. Colindres removed per orders, [address] in place and pt voiding. Complete bed change done this AM. All meds given per MAR. Call light within reach. Pt denies any further needs at this time.

## 2022-12-07 NOTE — PROGRESS NOTES
Called report to the [facility] and spoke with Ravi Ortega.

## 2022-12-07 NOTE — CARE COORDINATION
CASE MANAGEMENT DISCHARGE SUMMARY      Discharge to: [facility] completed: [facility] Exemption Notification (HENS) completed: NA    IMM given: (date) 12/7/22       Transportation:        Medical Transport explained to [facility]. Pt/family voice no agency preference. Agency used: [facility]   time: 5pm   Ambulance form completed: Yes    Confirmed discharge plan with:     Patient: yes   Family:  yes   Name: Titi López number: [phone]     Facility/Agency, name: [facility]   Phone number for report to facility: [phone]     RN, name: Darian    Note: Discharging nurse to complete YOON, reconcile AVS, and place final copy with patient's discharge packet. RN to ensure that written prescriptions for  Level II medications are sent with patient to the facility as per protocol.

## 2022-12-07 NOTE — PROGRESS NOTES
4 Eyes Skin Assessment     The patient is being assess for  Shift Handoff    I agree that 2 RN's have performed a thorough Head to Toe Skin Assessment on the patient. ALL assessment sites listed below have been assessed. Areas assessed by both nurses:    Bill Blanco LPN and ROHTIH Farmer  [x]   Head, Face, and Ears   [x]   Shoulders, Back, and Chest  [x]   Arms, Elbows, and Hands   [x]   Coccyx, Sacrum, and IschIum  [x]   Legs, Feet, and Heels    STAGE 2 R BUTTOCK- ZINC  TEARS R F/A- DSG CHANGED PER ORDER        Does the Patient have Skin Breakdown?   Yes LDA WOUND CARE was Initiated documentation include the Fany-wound, Wound Assessment, Measurements, Dressing Treatment, Drainage, and Color\",         Zach Prevention initiated:  Yes   Wound Care Orders initiated:  Yes      [address] nurse consulted for Pressure Injury (Stage 3,4, Unstageable, DTI, NWPT, and Complex wounds), New and Established Ostomies:  Yes      Nurse 1 eSignature: Electronically signed by Bhupinder Hoskins LPN on 74/6/57 at 9:67 PM EST    **SHARE this note so that the co-signing nurse is able to place an eSignature**    Nurse 2 eSignature: Electronically signed by Bina Light RN on 12/7/22 at 2:55 PM EST

## 2022-12-07 NOTE — PROGRESS NOTES
P[t d/c in stable condition with transport. Report called earlier. Paper with dgt number sent with pt as well as nystatin cream and extra dsg for R arm tear.

## 2022-12-07 NOTE — PROGRESS NOTES
[facility] [facility]  Nephrology Progress Note           CC: EDOUARD on CKD    HPI as of 11/27  80 y.o. yo female with PMH of chronic kidney disease stage III, hypertension, CHF with preserved EF, dementia, chronic lymphedema who is admitted for abdominal pain palpitation and fall  Patient was brought from nursing care facility to emergency room due to abdominal pain and not having bowel movement for 3 days.           SUBJECTIVE/ROS  High PVR, urology consulted,holding the Flomax, they recommended replacing the Colindres if PVR>400 ( it was >500 on 12/4)    Eating better, no N/V      SOC:+visitors      Scheduled Meds:   labetalol  50 mg Oral BID    hydrALAZINE  50 mg Oral TID    allopurinol  100 mg Oral Daily    aspirin  81 mg Oral Daily    atorvastatin  40 mg Oral Nightly    bisacodyl  5 mg Oral Nightly    citalopram  10 mg Oral Daily    doxazosin  1 mg Oral Nightly    DULoxetine  30 mg Oral Daily    guanFACINE  1 mg Oral QPM    isosorbide mononitrate  30 mg Oral Daily    nystatin   Topical BID    rivaroxaban  10 mg Oral Dinner    sodium chloride flush  5-40 mL IntraVENous 2 times per day     Continuous Infusions:   sodium chloride       PRN Meds:LORazepam, traMADol, sodium chloride flush, sodium chloride, ondansetron **OR** ondansetron, acetaminophen **OR** acetaminophen, dicyclomine      Objective:      Physical Exam  Wt Readings from Last 3 Encounters:   12/06/22 258 lb 4.8 oz (117.2 kg)   07/27/21 281 lb 4.8 oz (127.6 kg)   12/11/19 (!) 315 lb 0.6 oz (142.9 kg)     Temp Readings from Last 3 Encounters:   12/07/22 97.4 °F (36.3 °C) (Oral)   07/27/21 97.9 °F (36.6 °C) (Skin)   06/15/21 98.8 °F (37.1 °C) (Infrared)     BP Readings from Last 3 Encounters:   12/07/22 124/77   07/27/21 133/73   09/10/19 137/67     Pulse Readings from Last 3 Encounters:   12/07/22 (!) 104   09/10/19 70   06/11/19 68    Gen: alert, awake  Neck: No JVD  Skin: Unremarkable  Cardiovascular:  S1, S2 without m/r/g   Respiratory: CTA B without w/r/r; respiratory effort normal  Abdomen:  soft, nt, nd,   Extremities: Chronic lymphedema of lower extremities  Neuro/Psy: Alert, confused         Lab Review   Lab Results   Component Value Date    WBC 10.8 11/27/2022    HGB 12.7 11/27/2022    HCT 38.6 11/27/2022    MCV 96.6 11/27/2022     11/27/2022     Lab Results   Component Value Date/Time     12/05/2022 05:26 AM    K 3.8 12/05/2022 05:26 AM    K 3.8 12/04/2022 06:26 AM     12/05/2022 05:26 AM    CO2 24 12/05/2022 05:26 AM    BUN 16 12/05/2022 05:26 AM    CREATININE 1.2 12/05/2022 05:26 AM    GLUCOSE 98 12/05/2022 05:26 AM    CALCIUM 8.6 12/05/2022 05:26 AM            Patient Active Problem List    Diagnosis Date Noted    Acute metabolic encephalopathy 32/43/3213    Complicated UTI (urinary tract infection) 11/29/2022    Dementia with agitation 11/29/2022    Constipation 11/26/2022    Elevated serum creatinine 11/26/2022    Visit for wound check 03/05/2019    Basal cell carcinoma of left eyelid 02/19/2019    SOB (shortness of breath)     Chronic diastolic congestive heart failure (HCC)     Rhabdomyolysis 02/20/2018    Cellulitis 12/01/2017    EDOUARD (acute kidney injury) ([facility] [address] 12/01/2017    CKD (chronic kidney disease), stage III ([facility] [address] 12/01/2017    Essential hypertension 12/01/2017    CHF (congestive heart failure) ([facility] [address] 12/01/2017    Leukocytosis 12/01/2017    Primary osteoarthritis of right knee 10/01/2015    Knee pain, right 10/01/2015    Intractable abdominal pain     Acute pancreatitis        ASSESSMENT AND PLAN     #EDOUARD on CKD in the setting of possible UTI and decreased p.o. intake and relative hypotension.   Patient was on torsemide, metolazone and spironolactone as well-all held now  -creatinine trending down with IV fluids    #Chronic kidney disease stage IIIb with baseline creatinine of 1.8 - 2.4   -now at 1.2  -holding the diuretics      #Chronic diastolic CHF   -She also has chronic lymphedema   - Compensated and edema is the best per  [address]    #AMS   Infectious encephalopathy         #UTI   ID following    #HTN- BP was low, holding parameters on BP meds    # Hypokalemia / stable     DISPO- okay for discharge, will f/u with dr [address] in the office

## 2022-12-07 NOTE — PLAN OF CARE
Problem: Skin/Tissue Integrity  Goal: Absence of new skin breakdown  Description: 1. Monitor for areas of redness and/or skin breakdown  2. Assess vascular access sites hourly  3. Every 4-6 hours minimum:  Change oxygen saturation probe site  4. Every 4-6 hours:  If on nasal continuous positive airway pressure, respiratory therapy assess nares and determine need for appliance change or resting period.   Outcome: Progressing     Problem: Safety - Adult  Goal: Free from fall injury  Outcome: Progressing  Flowsheets (Taken 12/5/2022 1036 by Kristin Welch RN)  Free From Fall Injury: Instruct family/caregiver on patient safety     Problem: ABCDS Injury Assessment  Goal: Absence of physical injury  Outcome: Progressing  Flowsheets (Taken 11/27/2022 0508 by Riri Salmeron RN)  Absence of Physical Injury: Implement safety measures based on patient assessment     Problem: Pain  Goal: Verbalizes/displays adequate comfort level or baseline comfort level  Outcome: Progressing  Flowsheets (Taken 12/5/2022 1036 by Kristin Welch RN)  Verbalizes/displays adequate comfort level or baseline comfort level:   Encourage patient to monitor pain and request assistance   Assess pain using appropriate pain scale   Administer analgesics based on type and severity of pain and evaluate response     Problem: Chronic Conditions and Co-morbidities  Goal: Patient's chronic conditions and co-morbidity symptoms are monitored and maintained or improved  Outcome: Progressing  Flowsheets (Taken 12/5/2022 1036 by Kristin Welch, RN)  Care Plan - Patient's Chronic Conditions and Co-Morbidity Symptoms are Monitored and Maintained or Improved: Monitor and assess patient's chronic conditions and comorbid symptoms for stability, deterioration, or improvement     Problem: Nutrition Deficit:  Goal: Optimize nutritional status  Outcome: Progressing  Flowsheets (Taken 12/2/2022 1350 by Fam Prater, RD, LD)  Nutrient intake appropriate for improving, restoring, or maintaining nutritional needs: Monitor oral intake, labs, and treatment plans     Problem: Neurosensory - Adult  Goal: Achieves stable or improved neurological status  Outcome: Progressing  Goal: Achieves maximal functionality and self care  Outcome: Progressing     Problem: Cardiovascular - Adult  Goal: Maintains optimal cardiac output and hemodynamic stability  Outcome: Progressing  Goal: Absence of cardiac dysrhythmias or at baseline  Outcome: Progressing     Problem: Skin/Tissue Integrity - Adult  Goal: Skin integrity remains intact  Outcome: Progressing  Goal: Oral mucous membranes remain intact  Outcome: Progressing     Problem: Musculoskeletal - Adult  Goal: Return mobility to safest level of function  Outcome: Progressing  Goal: Return ADL status to a safe level of function  Outcome: Progressing     Problem: Genitourinary - Adult  Goal: Absence of urinary retention  Outcome: Progressing     Problem: Metabolic/Fluid and Electrolytes - Adult  Goal: Electrolytes maintained within normal limits  Outcome: Progressing  Goal: Hemodynamic stability and optimal renal function maintained  Outcome: Progressing  Goal: Glucose maintained within prescribed range  Outcome: Progressing     Problem: Anxiety  Goal: Will report anxiety at manageable levels  Description: INTERVENTIONS:  1. Administer medication as ordered  2. Teach and rehearse alternative coping skills  3. Provide emotional support with 1:1 interaction with staff  Outcome: Progressing     Problem: Confusion  Goal: Confusion, delirium, dementia, or psychosis is improved or at baseline  Description: INTERVENTIONS:  1. Assess for possible contributors to thought disturbance, including medications, impaired vision or hearing, underlying metabolic abnormalities, dehydration, psychiatric diagnoses, and notify attending LIP  2. [place] high risk fall precautions, as indicated  3.  Provide frequent short contacts to provide reality reorientation, refocusing and direction  4. Decrease environmental stimuli, including noise as appropriate  5. Monitor and intervene to maintain adequate nutrition, hydration, elimination, sleep and activity  6. If unable to ensure safety without constant attention obtain sitter and review sitter guidelines with assigned personnel  7. Initiate Psychosocial CNS and Spiritual Care consult, as indicated  Outcome: Progressing     Problem: Behavior  Goal: Pt/Family maintain appropriate behavior and adhere to behavioral management agreement, if implemented  Description: INTERVENTIONS:  1. Assess patient/family's coping skills and  non-compliant behavior (including use of illegal substances)  2. Notify security of behavior or suspected illegal substances which indicate the need for search of the family and/or belongings  3. Encourage verbalization of thoughts and concerns in a socially appropriate manner  4. Utilize positive, consistent limit setting strategies supporting safety of patient, staff and others  5. Encourage participation in the decision making process about the behavioral management agreement  6. If a visitor's behavior poses a threat to safety call refer to organization policy.   7. Initiate consult with , Psychosocial CNS, Spiritual Care as appropriate  Outcome: Progressing

## 2022-12-07 NOTE — PLAN OF CARE
Problem: Skin/Tissue Integrity  Goal: Absence of new skin breakdown  Description: 1. Monitor for areas of redness and/or skin breakdown  2. Assess vascular access sites hourly  3. Every 4-6 hours minimum:  Change oxygen saturation probe site  4. Every 4-6 hours:  If on nasal continuous positive airway pressure, respiratory therapy assess nares and determine need for appliance change or resting period.   12/7/2022 1352 by Elton Morales LPN  Outcome: Adequate for Discharge  12/7/2022 1015 by Elton Morales LPN  Outcome: Progressing     Problem: Safety - Adult  Goal: Free from fall injury  12/7/2022 1352 by Elton Morales LPN  Outcome: Adequate for Discharge  12/7/2022 1015 by Elton Morales LPN  Outcome: Progressing  Flowsheets (Taken 12/5/2022 1036 by Ira Slater, RN)  Free From Fall Injury: Instruct family/caregiver on patient safety     Problem: ABCDS Injury Assessment  Goal: Absence of physical injury  12/7/2022 1352 by Elton Morales LPN  Outcome: Adequate for Discharge  12/7/2022 1015 by Elton Morales LPN  Outcome: Progressing  Flowsheets (Taken 11/27/2022 0508 by Saintclair Painter, RN)  Absence of Physical Injury: Implement safety measures based on patient assessment     Problem: Pain  Goal: Verbalizes/displays adequate comfort level or baseline comfort level  12/7/2022 1352 by Elton Morales LPN  Outcome: Adequate for Discharge  12/7/2022 1015 by Elton Morales LPN  Outcome: Progressing  Flowsheets (Taken 12/5/2022 1036 by Ira Slater, ROHITH)  Verbalizes/displays adequate comfort level or baseline comfort level:   Encourage patient to monitor pain and request assistance   Assess pain using appropriate pain scale   Administer analgesics based on type and severity of pain and evaluate response     Problem: Chronic Conditions and Co-morbidities  Goal: Patient's chronic conditions and co-morbidity symptoms are monitored and maintained or improved  12/7/2022 1352 by Elton Morales LPN  Outcome: Adequate for Discharge  12/7/2022 1015 by Cecilia Duffy LPN  Outcome: Progressing  Flowsheets (Taken 12/5/2022 1036 by Gennaro Prasad RN)  Care Plan - Patient's Chronic Conditions and Co-Morbidity Symptoms are Monitored and Maintained or Improved: Monitor and assess patient's chronic conditions and comorbid symptoms for stability, deterioration, or improvement     Problem: Nutrition Deficit:  Goal: Optimize nutritional status  12/7/2022 1352 by Cecilia Duffy LPN  Outcome: Adequate for Discharge  12/7/2022 1015 by Cecilia Duffy LPN  Outcome: Progressing  Flowsheets (Taken 12/2/2022 1350 by Sissy Frank RD, LD)  Nutrient intake appropriate for improving, restoring, or maintaining nutritional needs: Monitor oral intake, labs, and treatment plans     Problem: Neurosensory - Adult  Goal: Achieves stable or improved neurological status  12/7/2022 1352 by Cecilia Duffy LPN  Outcome: Adequate for Discharge  12/7/2022 1015 by Cecilia Duffy LPN  Outcome: Progressing  Goal: Achieves maximal functionality and self care  12/7/2022 1352 by Cecilia Duffy LPN  Outcome: Adequate for Discharge  12/7/2022 1015 by Cecilia Duffy LPN  Outcome: Progressing     Problem: Cardiovascular - Adult  Goal: Maintains optimal cardiac output and hemodynamic stability  12/7/2022 1352 by Cecilia Duffy LPN  Outcome: Adequate for Discharge  12/7/2022 1015 by Cecilia Duffy LPN  Outcome: Progressing  Goal: Absence of cardiac dysrhythmias or at baseline  12/7/2022 1352 by Cecilia Duffy LPN  Outcome: Adequate for Discharge  12/7/2022 1015 by Cecilia Duffy LPN  Outcome: Progressing     Problem: Skin/Tissue Integrity - Adult  Goal: Skin integrity remains intact  12/7/2022 1352 by Cecilia Duffy LPN  Outcome: Adequate for Discharge  Flowsheets (Taken 12/7/2022 1119)  Skin Integrity Remains Intact: Monitor for areas of redness and/or skin breakdown  12/7/2022 1015 by Cecilia Duffy LPN  Outcome: Progressing  Goal: Oral mucous membranes remain intact  12/7/2022 1352 by Lily Rosario LPN  Outcome: Adequate for Discharge  12/7/2022 1015 by Lily Rosario LPN  Outcome: Progressing     Problem: Musculoskeletal - Adult  Goal: Return mobility to safest level of function  12/7/2022 1352 by Lily Rosario LPN  Outcome: Adequate for Discharge  12/7/2022 1015 by Lily Rosario LPN  Outcome: Progressing  Goal: Return ADL status to a safe level of function  12/7/2022 1352 by Lily Rosario LPN  Outcome: Adequate for Discharge  12/7/2022 1015 by Lily Rosario LPN  Outcome: Progressing     Problem: Genitourinary - Adult  Goal: Absence of urinary retention  12/7/2022 1352 by Lily Rosario LPN  Outcome: Adequate for Discharge  12/7/2022 1015 by Lily Rosario LPN  Outcome: Progressing     Problem: Metabolic/Fluid and Electrolytes - Adult  Goal: Electrolytes maintained within normal limits  12/7/2022 1352 by Lily Rosario LPN  Outcome: Adequate for Discharge  12/7/2022 1015 by Lily Rosario LPN  Outcome: Progressing  Goal: Hemodynamic stability and optimal renal function maintained  12/7/2022 1352 by Lily Rosario LPN  Outcome: Adequate for Discharge  12/7/2022 1015 by Lily Rosario LPN  Outcome: Progressing  Goal: Glucose maintained within prescribed range  12/7/2022 1352 by Lily Rosario LPN  Outcome: Adequate for Discharge  12/7/2022 1015 by Lily Rosario LPN  Outcome: Progressing     Problem: Anxiety  Goal: Will report anxiety at manageable levels  Description: INTERVENTIONS:  1. Administer medication as ordered  2. Teach and rehearse alternative coping skills  3. Provide emotional support with 1:1 interaction with staff  12/7/2022 1352 by Lily Rosario LPN  Outcome: Adequate for Discharge  12/7/2022 1015 by Lily Rosario LPN  Outcome: Progressing     Problem: Confusion  Goal: Confusion, delirium, dementia, or psychosis is improved or at baseline  Description: INTERVENTIONS:  1.  Assess for possible contributors to thought disturbance, including medications, impaired vision or hearing, underlying

## 2022-12-07 NOTE — PROGRESS NOTES
Urology Progress Note      Subjective: Jayla Hebert seen sitting up in bed. Denies complaints. Colindres removed yesterday and she is voiding. Vitals:  /77   Pulse (!) 104   Temp 97.4 °F (36.3 °C) (Oral)   Resp 16   Ht 5' 5\" (1.651 m)   Wt 258 lb 4.8 oz (117.2 kg)   SpO2 96%   BMI 42.98 kg/m²   Temp  Av.5 °F (36.4 °C)  Min: 97.4 °F (36.3 °C)  Max: 97.6 °F (36.4 °C)    Intake/Output Summary (Last 24 hours) at 2022 1035  Last data filed at 2022 0934  Gross per 24 hour   Intake 180 ml   Output 475 ml   Net -295 ml       Exam:   Physical:  Well developed, well nourished in no acute distress  Mood indicates no abnormalities. Pt doesnt appear depressed  Orientated to time and place    Labs:  WBC:    Lab Results   Component Value Date/Time    WBC 10.8 2022 02:33 PM     Hemoglobin/Hematocrit:    Lab Results   Component Value Date/Time    HGB 12.7 2022 02:33 PM    HCT 38.6 2022 02:33 PM     BMP:    Lab Results   Component Value Date/Time     2022 05:26 AM    K 3.8 2022 05:26 AM    K 3.8 2022 06:26 AM     2022 05:26 AM    CO2 24 2022 05:26 AM    BUN 16 2022 05:26 AM    LABALBU 2.8 2022 05:26 AM    CREATININE 1.2 2022 05:26 AM    CALCIUM 8.6 2022 05:26 AM    GFRAA 43 2018 08:37 AM    GFRAA 55 2013 10:33 AM    LABGLOM 42 2022 05:26 AM     PT/INR:    Lab Results   Component Value Date/Time    PROTIME 13.5 2017 10:36 AM    INR 1.19 2017 10:36 AM     PTT:    Lab Results   Component Value Date/Time    APTT 23.0 2018 06:17 AM   [APTT  Urinalysis:     Latest Reference Range & Units 22 20:34   Color, UA Straw/Yellow  Yellow   Clarity, UA Clear  CLOUDY !    Glucose, UA Negative mg/dL Negative   Bilirubin, Urine Negative  Negative   Ketones, Urine Negative mg/dL Negative   Specific Gravity, UA 1.005 - 1.030  1.020   Blood, Urine Negative  TRACE-INTACT !   pH, UA 5.0 - 8.0 5.5   Protein, UA Negative mg/dL Negative   Urobilinogen, Urine <2.0 E.U./dL 0.2   Nitrite, Urine Negative  Negative   Leukocyte Esterase, Urine Negative  LARGE ! Urine Type   NotGiven   Urine Reflex to Culture   Yes   Hyaline Casts, UA 0 - 2 /LPF 3-5 ! Mucus, UA None Seen /LPF 1+ ! WBC, UA 0 - 5 /HPF >100 ! RBC, UA 0 - 4 /HPF 3-4   Epithelial Cells, UA 0 - 5 /HPF 0-1   Bacteria, UA None Seen /HPF 2+ ! Microscopic Examination   YES   !: Data is abnormal  Urine Culture: Enterococcus faecalis and Psuedomonas aeruginosa        Antibiotic Therapy:  completed therapy      Imaging:   CT 11/26  1. Constipation with large amount of stool within the sigmoid colon and   rectum. Otherwise, no acute intra-abdominal or pelvic abnormality with   limitations for a noncontrast CT scan.       Impression/Plan:     Urinary retention  - thao removed, she is voiding, RN reports  which is acceptable   - cont bowel regimen   - signing off, please call with any questions     Safia Ulloa PA-C  [facility]

## 2022-12-10 NOTE — DISCHARGE SUMMARY
Hospital Medicine Discharge Summary    Patient ID: Casi Seay      Patient's PCP: Chon Marrero MD    Admit Date: 11/26/2022     Discharge Date: 12/7/2022      Admitting Provider: Cesar Cruz MD     Discharge Provider: Cesar Cruz MD     Discharge Diagnoses: Active Hospital Problems    Diagnosis     Acute metabolic encephalopathy [P82.93]      Priority: High    Complicated UTI (urinary tract infection) [N39.0]      Priority: Medium    Dementia with agitation [F03.911]      Priority: Medium    Constipation [K59.00]      Priority: Medium    Elevated serum creatinine [R79.89]      Priority: Medium       The patient was seen and examined on day of discharge and this discharge summary is in conjunction with any daily progress note from day of discharge. HPI on 11/26/2022:     Casi Seay is a 80-year-old female with a significant past medical history of hypertension, stage IV CKD with a baseline creatinine of 1.8, chronic diastolic heart failure, and unspecified dementia who is a resident at the [facility] and was brought in via EMS for a multitude of concerns by [facility] [place] staff: There is concerns for abdominal pain, report of little to no BM for the last 3 days; reportedly concerns for palpitations by the daughter who is not at bedside at this time; reported fall today after lunch, it was reported that she was placed back in bed after her mail and on the next check by staff she was found to be on the floor with no evidence of obvious injury. Given the dementia, she cannot provide much details in regards to the presenting complaints. The daughter was at bedside at some point during her ER stay but is no longer present to assist with data gathering. Additionally, daughter reports that she is undergoing antibiotic therapy for recent diagnosis of UTI, there is no knowledge of documentation of what antibiotic she is on.   Her ED evaluation included laboratory studies and CT of the abdomen pelvis without contrast.  CT showed large amount of stool collecting in the sigmoid and rectum. Pertinent lab values tonight include chloride 96, BUN 34, creatinine 2.5, GFR 18, troponin 0.06. LFT showed slight elevation of alkaline phosphatase at 166 and a lipase of 104. Cell count showed normal WBC at 8.6, hemoglobin 13.5, and platelets 638. Patient was given IV hydration, morphine, Zofran, and SMOG enema in the emergency department. Hospital team was consulted to place patient observation for constipation. Hospital Course:     Acute Metabolic encephalopathy with underlying dementia. Likely 2/2 UTI. Completed course of abx per ID recs. Mentation improved to baseline. Uses as needed Valium at  [facility]  which was resumed at d/c         UTI: Abnormal UA. UCx showed Enterococcus and Pseudomonas. Significant pyuria on UA. ID assisted. Completed abx course with amoxil and cipro inpatient     Chronic anticoagulation. Continue Xarelto. HTN: Regimen/diuretics adjusted d/t infection, hypotension. Continue hydralazine, labetalol        EDOUARD on CKD. Baseline creatinine around 1.5-1.8. Metolazone, spironolactone, torsemide held. s/p  IVF hydration with improvement. Nephro assisted. Torsemide restarted at reduced dose just take 20 mg twice a week on Monday and Friday. Urinary retention: Initially improved with Colindres placement which was removed but was retaining again. Started on Flomax in addition to her home cadura. Urology consulted and recommended against use of 2 alpha blockers. Flomax d/shereen. Continued on Cardura. Voiding trial was done with no further retention. Bilat calf tenderness : doppler US neg for DVT           Physical Exam Performed:     /72   Pulse (!) 110   Temp 97.7 °F (36.5 °C) (Oral)   Resp 16   Ht 5' 5\" (1.651 m)   Wt 258 lb 4.8 oz (117.2 kg)   SpO2 97%   BMI 42.98 kg/m²   General appearance: No apparent distress, appears stated age and cooperative. HEENT: Pupils equal, round, . Conjunctivae/corneas clear. Neck: Supple, with full range of motion. No jugular venous distention. Trachea midline. Respiratory:  Normal respiratory effort. Clear to auscultation, bilaterally without Rales/Wheezes/Rhonchi. Cardiovascular: Regular rate and rhythm with normal S1/S2 without murmurs, rubs or gallops. Abdomen: Soft, non-tender, non-distended with normal bowel sounds. Musculoskeletal: No clubbing, cyanosis . Mild bilat pedal edema. Bilat calf tenderness   Skin: warm and dry   Neurologic:  Neurovascularly intact without any focal sensory/motor deficits. Cranial nerves: II-XII intact, grossly non-focal.  Psychiatric: AAOX3, limited insight due to dementia         Labs: For convenience and continuity at follow-up the following most recent labs are provided:      CBC:    Lab Results   Component Value Date/Time    WBC 10.8 11/27/2022 02:33 PM    HGB 12.7 11/27/2022 02:33 PM    HCT 38.6 11/27/2022 02:33 PM     11/27/2022 02:33 PM       Renal:    Lab Results   Component Value Date/Time     12/05/2022 05:26 AM    K 3.8 12/05/2022 05:26 AM    K 3.8 12/04/2022 06:26 AM     12/05/2022 05:26 AM    CO2 24 12/05/2022 05:26 AM    BUN 16 12/05/2022 05:26 AM    CREATININE 1.2 12/05/2022 05:26 AM    CALCIUM 8.6 12/05/2022 05:26 AM    PHOS 3.0 12/05/2022 05:26 AM         Significant Diagnostic Studies    Radiology:   VL Extremity Venous Bilateral   Final Result      CT ABDOMEN PELVIS WO CONTRAST Additional Contrast? None   Final Result   1. Constipation with large amount of stool within the sigmoid colon and   rectum. Otherwise, no acute intra-abdominal or pelvic abnormality with   limitations for a noncontrast CT scan. 2. Diverticulosis without obvious inflammation.                 Consults:     IP CONSULT TO NEPHROLOGY  IP CONSULT TO INFECTIOUS DISEASES  IP CONSULT TO PALLIATIVE CARE  IP CONSULT TO UROLOGY    Disposition:  Back to SNF      Condition at Discharge: tablet by mouth daily, Disp-30 tablet, R-1NO PRINT      meclizine (ANTIVERT) 25 MG tablet Take 1 tablet by mouth 3 times daily as needed for Dizziness, Disp-15 tablet, R-0NO PRINT      citalopram (CELEXA) 10 MG tablet Take 1 tablet by mouth daily, Disp-30 tablet, R-0NO PRINT      atorvastatin (LIPITOR) 40 MG tablet Take 1 tablet by mouth nightly, Disp-30 tablet, R-0NO PRINT      predniSONE (DELTASONE) 10 MG tablet Take 10 mg by mouth as needed Only when needed for inflammation on back- usually start with 10mg taper dose. Historical Med      guanFACINE (TENEX) 1 MG tablet Take 1 mg by mouth every evening Historical Med      Multiple Vitamins-Minerals (PRESERVISION/LUTEIN) CAPS Take 1 capsule by mouth daily      acetaminophen (TYLENOL) 500 MG tablet Take 500 mg by mouth every 6 hours as needed for Pain For chronic back painHistorical Med      aspirin 81 MG chewable tablet Take 81 mg by mouth daily  Historical Med             Time Spent on discharge: 42 minutes  in the examination, evaluation, counseling and review of medications and discharge plan. Signed:    Lisa Brown MD   12/10/2022      Thank you Arthur Jimenez MD for the opportunity to be involved in this patient's care. If you have any questions or concerns, please feel free to contact me at [phone].